# Patient Record
Sex: MALE | Race: OTHER | NOT HISPANIC OR LATINO
[De-identification: names, ages, dates, MRNs, and addresses within clinical notes are randomized per-mention and may not be internally consistent; named-entity substitution may affect disease eponyms.]

---

## 2015-12-06 RX ORDER — METOPROLOL TARTRATE 50 MG
0.5 TABLET ORAL
Qty: 0 | Refills: 0 | COMMUNITY
Start: 2015-12-06

## 2015-12-06 RX ORDER — WARFARIN SODIUM 2.5 MG/1
1 TABLET ORAL
Qty: 0 | Refills: 0 | COMMUNITY
Start: 2015-12-06

## 2017-01-09 ENCOUNTER — APPOINTMENT (OUTPATIENT)
Dept: CV DIAGNOSITCS | Facility: HOSPITAL | Age: 67
End: 2017-01-09

## 2017-01-09 ENCOUNTER — OUTPATIENT (OUTPATIENT)
Dept: OUTPATIENT SERVICES | Facility: HOSPITAL | Age: 67
LOS: 1 days | End: 2017-01-09
Payer: COMMERCIAL

## 2017-01-09 DIAGNOSIS — Z98.890 OTHER SPECIFIED POSTPROCEDURAL STATES: ICD-10-CM

## 2017-01-09 DIAGNOSIS — Z95.4 PRESENCE OF OTHER HEART-VALVE REPLACEMENT: Chronic | ICD-10-CM

## 2017-01-09 PROCEDURE — C8929: CPT

## 2017-01-09 PROCEDURE — 93306 TTE W/DOPPLER COMPLETE: CPT | Mod: 26

## 2017-02-01 ENCOUNTER — APPOINTMENT (OUTPATIENT)
Dept: ELECTROPHYSIOLOGY | Facility: CLINIC | Age: 67
End: 2017-02-01

## 2017-02-01 VITALS
SYSTOLIC BLOOD PRESSURE: 128 MMHG | HEART RATE: 55 BPM | WEIGHT: 246 LBS | BODY MASS INDEX: 35.22 KG/M2 | HEIGHT: 70 IN | DIASTOLIC BLOOD PRESSURE: 84 MMHG | OXYGEN SATURATION: 96 %

## 2017-05-15 ENCOUNTER — RX RENEWAL (OUTPATIENT)
Age: 67
End: 2017-05-15

## 2017-06-09 ENCOUNTER — APPOINTMENT (OUTPATIENT)
Dept: CARDIOLOGY | Facility: CLINIC | Age: 67
End: 2017-06-09

## 2017-06-09 ENCOUNTER — NON-APPOINTMENT (OUTPATIENT)
Age: 67
End: 2017-06-09

## 2017-06-09 VITALS
BODY MASS INDEX: 36.08 KG/M2 | SYSTOLIC BLOOD PRESSURE: 128 MMHG | OXYGEN SATURATION: 96 % | HEIGHT: 70 IN | WEIGHT: 252 LBS | DIASTOLIC BLOOD PRESSURE: 83 MMHG | HEART RATE: 51 BPM

## 2017-06-09 DIAGNOSIS — R07.89 OTHER CHEST PAIN: ICD-10-CM

## 2017-06-12 ENCOUNTER — RX RENEWAL (OUTPATIENT)
Age: 67
End: 2017-06-12

## 2017-06-23 ENCOUNTER — OUTPATIENT (OUTPATIENT)
Dept: OUTPATIENT SERVICES | Facility: HOSPITAL | Age: 67
LOS: 1 days | End: 2017-06-23
Payer: MEDICARE

## 2017-06-23 ENCOUNTER — APPOINTMENT (OUTPATIENT)
Dept: CV DIAGNOSTICS | Facility: HOSPITAL | Age: 67
End: 2017-06-23

## 2017-06-23 DIAGNOSIS — Z95.4 PRESENCE OF OTHER HEART-VALVE REPLACEMENT: Chronic | ICD-10-CM

## 2017-06-23 DIAGNOSIS — I10 ESSENTIAL (PRIMARY) HYPERTENSION: ICD-10-CM

## 2017-06-23 PROCEDURE — 78452 HT MUSCLE IMAGE SPECT MULT: CPT | Mod: 26

## 2017-06-23 PROCEDURE — 78452 HT MUSCLE IMAGE SPECT MULT: CPT

## 2017-06-23 PROCEDURE — 93018 CV STRESS TEST I&R ONLY: CPT

## 2017-06-23 PROCEDURE — 93016 CV STRESS TEST SUPVJ ONLY: CPT

## 2017-06-23 PROCEDURE — 93017 CV STRESS TEST TRACING ONLY: CPT

## 2017-06-23 PROCEDURE — A9500: CPT

## 2017-07-12 ENCOUNTER — RX RENEWAL (OUTPATIENT)
Age: 67
End: 2017-07-12

## 2017-08-02 ENCOUNTER — APPOINTMENT (OUTPATIENT)
Dept: ELECTROPHYSIOLOGY | Facility: CLINIC | Age: 67
End: 2017-08-02
Payer: COMMERCIAL

## 2017-08-02 ENCOUNTER — NON-APPOINTMENT (OUTPATIENT)
Age: 67
End: 2017-08-02

## 2017-08-02 VITALS — HEART RATE: 50 BPM | SYSTOLIC BLOOD PRESSURE: 137 MMHG | DIASTOLIC BLOOD PRESSURE: 92 MMHG

## 2017-08-02 PROCEDURE — 93000 ELECTROCARDIOGRAM COMPLETE: CPT

## 2017-08-02 PROCEDURE — 99214 OFFICE O/P EST MOD 30 MIN: CPT

## 2017-08-02 RX ORDER — CLARITHROMYCIN 500 MG/1
500 TABLET, FILM COATED ORAL
Qty: 3 | Refills: 0 | Status: DISCONTINUED | COMMUNITY
Start: 2017-05-15 | End: 2017-08-02

## 2017-10-29 ENCOUNTER — EMERGENCY (EMERGENCY)
Facility: HOSPITAL | Age: 67
LOS: 1 days | Discharge: HOME | End: 2017-10-29
Attending: EMERGENCY MEDICINE | Admitting: UROLOGY
Payer: MEDICARE

## 2017-10-29 VITALS
HEART RATE: 71 BPM | OXYGEN SATURATION: 100 % | RESPIRATION RATE: 20 BRPM | TEMPERATURE: 98 F | DIASTOLIC BLOOD PRESSURE: 76 MMHG | SYSTOLIC BLOOD PRESSURE: 124 MMHG

## 2017-10-29 DIAGNOSIS — Z95.4 PRESENCE OF OTHER HEART-VALVE REPLACEMENT: Chronic | ICD-10-CM

## 2017-10-29 DIAGNOSIS — N20.0 CALCULUS OF KIDNEY: ICD-10-CM

## 2017-10-29 LAB
ALBUMIN SERPL ELPH-MCNC: 3.7 G/DL — SIGNIFICANT CHANGE UP (ref 3.3–5)
ALP SERPL-CCNC: 63 U/L — SIGNIFICANT CHANGE UP (ref 40–120)
ALT FLD-CCNC: 37 U/L RC — SIGNIFICANT CHANGE UP (ref 10–45)
ANION GAP SERPL CALC-SCNC: 15 MMOL/L — SIGNIFICANT CHANGE UP (ref 5–17)
APPEARANCE UR: CLEAR — SIGNIFICANT CHANGE UP
AST SERPL-CCNC: 26 U/L — SIGNIFICANT CHANGE UP (ref 10–40)
BASOPHILS # BLD AUTO: 0.1 K/UL — SIGNIFICANT CHANGE UP (ref 0–0.2)
BASOPHILS NFR BLD AUTO: 0.6 % — SIGNIFICANT CHANGE UP (ref 0–2)
BILIRUB SERPL-MCNC: 0.4 MG/DL — SIGNIFICANT CHANGE UP (ref 0.2–1.2)
BILIRUB UR-MCNC: NEGATIVE — SIGNIFICANT CHANGE UP
BUN SERPL-MCNC: 21 MG/DL — SIGNIFICANT CHANGE UP (ref 7–23)
CALCIUM SERPL-MCNC: 9.3 MG/DL — SIGNIFICANT CHANGE UP (ref 8.4–10.5)
CHLORIDE SERPL-SCNC: 99 MMOL/L — SIGNIFICANT CHANGE UP (ref 96–108)
CO2 SERPL-SCNC: 24 MMOL/L — SIGNIFICANT CHANGE UP (ref 22–31)
COLOR SPEC: YELLOW — SIGNIFICANT CHANGE UP
CREAT SERPL-MCNC: 1.74 MG/DL — HIGH (ref 0.5–1.3)
DIFF PNL FLD: ABNORMAL
EOSINOPHIL # BLD AUTO: 0.2 K/UL — SIGNIFICANT CHANGE UP (ref 0–0.5)
EOSINOPHIL NFR BLD AUTO: 2.4 % — SIGNIFICANT CHANGE UP (ref 0–6)
GLUCOSE SERPL-MCNC: 106 MG/DL — HIGH (ref 70–99)
GLUCOSE UR QL: NEGATIVE — SIGNIFICANT CHANGE UP
HCT VFR BLD CALC: 38.5 % — LOW (ref 39–50)
HGB BLD-MCNC: 10.5 G/DL — LOW (ref 13–17)
KETONES UR-MCNC: NEGATIVE — SIGNIFICANT CHANGE UP
LEUKOCYTE ESTERASE UR-ACNC: ABNORMAL
LYMPHOCYTES # BLD AUTO: 1.8 K/UL — SIGNIFICANT CHANGE UP (ref 1–3.3)
LYMPHOCYTES # BLD AUTO: 20.2 % — SIGNIFICANT CHANGE UP (ref 13–44)
MCHC RBC-ENTMCNC: 25 PG — LOW (ref 27–34)
MCHC RBC-ENTMCNC: 27.2 GM/DL — LOW (ref 32–36)
MCV RBC AUTO: 92 FL — SIGNIFICANT CHANGE UP (ref 80–100)
MONOCYTES # BLD AUTO: 0.9 K/UL — SIGNIFICANT CHANGE UP (ref 0–0.9)
MONOCYTES NFR BLD AUTO: 10.1 % — SIGNIFICANT CHANGE UP (ref 2–14)
NEUTROPHILS # BLD AUTO: 5.9 K/UL — SIGNIFICANT CHANGE UP (ref 1.8–7.4)
NEUTROPHILS NFR BLD AUTO: 66.7 % — SIGNIFICANT CHANGE UP (ref 43–77)
NITRITE UR-MCNC: NEGATIVE — SIGNIFICANT CHANGE UP
PH UR: 5.5 — SIGNIFICANT CHANGE UP (ref 5–8)
PLATELET # BLD AUTO: 280 K/UL — SIGNIFICANT CHANGE UP (ref 150–400)
POTASSIUM SERPL-MCNC: 4.6 MMOL/L — SIGNIFICANT CHANGE UP (ref 3.5–5.3)
POTASSIUM SERPL-SCNC: 4.6 MMOL/L — SIGNIFICANT CHANGE UP (ref 3.5–5.3)
PROT SERPL-MCNC: 8 G/DL — SIGNIFICANT CHANGE UP (ref 6–8.3)
PROT UR-MCNC: 30 MG/DL
RBC # BLD: 4.19 M/UL — LOW (ref 4.2–5.8)
RBC # FLD: 12.1 % — SIGNIFICANT CHANGE UP (ref 10.3–14.5)
SODIUM SERPL-SCNC: 138 MMOL/L — SIGNIFICANT CHANGE UP (ref 135–145)
SP GR SPEC: 1.02 — SIGNIFICANT CHANGE UP (ref 1.01–1.02)
UROBILINOGEN FLD QL: NEGATIVE — SIGNIFICANT CHANGE UP
WBC # BLD: 8.8 K/UL — SIGNIFICANT CHANGE UP (ref 3.8–10.5)
WBC # FLD AUTO: 8.8 K/UL — SIGNIFICANT CHANGE UP (ref 3.8–10.5)

## 2017-10-29 RX ORDER — VALSARTAN 80 MG/1
40 TABLET ORAL DAILY
Qty: 0 | Refills: 0 | Status: DISCONTINUED | OUTPATIENT
Start: 2017-10-29 | End: 2017-10-30

## 2017-10-29 RX ORDER — SENNA PLUS 8.6 MG/1
2 TABLET ORAL AT BEDTIME
Qty: 0 | Refills: 0 | Status: DISCONTINUED | OUTPATIENT
Start: 2017-10-29 | End: 2017-10-30

## 2017-10-29 RX ORDER — SODIUM CHLORIDE 9 MG/ML
1000 INJECTION INTRAMUSCULAR; INTRAVENOUS; SUBCUTANEOUS ONCE
Qty: 0 | Refills: 0 | Status: COMPLETED | OUTPATIENT
Start: 2017-10-29 | End: 2017-10-29

## 2017-10-29 RX ORDER — OMEGA-3 ACID ETHYL ESTERS 1 G
2 CAPSULE ORAL
Qty: 0 | Refills: 0 | Status: DISCONTINUED | OUTPATIENT
Start: 2017-10-29 | End: 2017-10-30

## 2017-10-29 RX ORDER — HEPARIN SODIUM 5000 [USP'U]/ML
5000 INJECTION INTRAVENOUS; SUBCUTANEOUS EVERY 8 HOURS
Qty: 0 | Refills: 0 | Status: DISCONTINUED | OUTPATIENT
Start: 2017-10-29 | End: 2017-10-30

## 2017-10-29 RX ORDER — ACETAMINOPHEN 500 MG
650 TABLET ORAL EVERY 6 HOURS
Qty: 0 | Refills: 0 | Status: DISCONTINUED | OUTPATIENT
Start: 2017-10-29 | End: 2017-10-30

## 2017-10-29 RX ORDER — TAMSULOSIN HYDROCHLORIDE 0.4 MG/1
0.4 CAPSULE ORAL DAILY
Qty: 0 | Refills: 0 | Status: DISCONTINUED | OUTPATIENT
Start: 2017-10-29 | End: 2017-10-30

## 2017-10-29 RX ORDER — DOCUSATE SODIUM 100 MG
100 CAPSULE ORAL
Qty: 0 | Refills: 0 | Status: DISCONTINUED | OUTPATIENT
Start: 2017-10-29 | End: 2017-10-30

## 2017-10-29 RX ORDER — ASPIRIN/CALCIUM CARB/MAGNESIUM 324 MG
81 TABLET ORAL DAILY
Qty: 0 | Refills: 0 | Status: DISCONTINUED | OUTPATIENT
Start: 2017-10-29 | End: 2017-10-29

## 2017-10-29 RX ORDER — OXYCODONE AND ACETAMINOPHEN 5; 325 MG/1; MG/1
1 TABLET ORAL EVERY 4 HOURS
Qty: 0 | Refills: 0 | Status: DISCONTINUED | OUTPATIENT
Start: 2017-10-29 | End: 2017-10-30

## 2017-10-29 RX ORDER — SODIUM CHLORIDE 9 MG/ML
1000 INJECTION INTRAMUSCULAR; INTRAVENOUS; SUBCUTANEOUS
Qty: 0 | Refills: 0 | Status: DISCONTINUED | OUTPATIENT
Start: 2017-10-29 | End: 2017-10-30

## 2017-10-29 RX ORDER — METOPROLOL TARTRATE 50 MG
25 TABLET ORAL
Qty: 0 | Refills: 0 | Status: DISCONTINUED | OUTPATIENT
Start: 2017-10-29 | End: 2017-10-30

## 2017-10-29 RX ORDER — ASPIRIN/CALCIUM CARB/MAGNESIUM 324 MG
81 TABLET ORAL DAILY
Qty: 0 | Refills: 0 | Status: DISCONTINUED | OUTPATIENT
Start: 2017-10-29 | End: 2017-10-30

## 2017-10-29 RX ORDER — OXYCODONE AND ACETAMINOPHEN 5; 325 MG/1; MG/1
2 TABLET ORAL EVERY 4 HOURS
Qty: 0 | Refills: 0 | Status: DISCONTINUED | OUTPATIENT
Start: 2017-10-29 | End: 2017-10-30

## 2017-10-29 RX ORDER — WARFARIN SODIUM 2.5 MG/1
5 TABLET ORAL ONCE
Qty: 0 | Refills: 0 | Status: COMPLETED | OUTPATIENT
Start: 2017-10-29 | End: 2017-10-29

## 2017-10-29 RX ORDER — ATORVASTATIN CALCIUM 80 MG/1
20 TABLET, FILM COATED ORAL AT BEDTIME
Qty: 0 | Refills: 0 | Status: DISCONTINUED | OUTPATIENT
Start: 2017-10-29 | End: 2017-10-30

## 2017-10-29 RX ADMIN — ATORVASTATIN CALCIUM 20 MILLIGRAM(S): 80 TABLET, FILM COATED ORAL at 21:50

## 2017-10-29 RX ADMIN — WARFARIN SODIUM 5 MILLIGRAM(S): 2.5 TABLET ORAL at 21:50

## 2017-10-29 RX ADMIN — Medication 25 MILLIGRAM(S): at 20:28

## 2017-10-29 RX ADMIN — SODIUM CHLORIDE 1000 MILLILITER(S): 9 INJECTION INTRAMUSCULAR; INTRAVENOUS; SUBCUTANEOUS at 12:33

## 2017-10-29 RX ADMIN — Medication 100 MILLIGRAM(S): at 21:50

## 2017-10-29 NOTE — ED PROVIDER NOTE - CARE PLAN
Principal Discharge DX:	Kidney stones Principal Discharge DX:	Kidney stones  Secondary Diagnosis:	DON (acute kidney injury)

## 2017-10-29 NOTE — ED PROVIDER NOTE - OBJECTIVE STATEMENT
67 yo M with known nephrolithiasis, recently seen at [facility] on 10/23 with CT results showing 3mm left proximal ureteral calculus, also with 1.7mm left intrarenal calculus c/o continuation of sx despite outpatient pain mgt. Reports being d/c from the hospital on Monday feeling well but started spiking a fever on Wednesday tmax 100.9, +chills, +dyusria/hematuria/oliguria. Pain today started at 1am and woke him from sleep. Left sided flank with radiation into the left groin, 2/10 at rest, 8/10 with spasms. No emesis. Also with PMH of CAD s/p CABG, mitral valve repair 2004, BPH, HTN, HLD but denies any chest pain, SOB, HA, dizziness, recent travel.   PMD:  Dr. Ashley Saab

## 2017-10-29 NOTE — H&P ADULT - ASSESSMENT
66M with 4mm left UVJ stone, DON, poor pain control. Noted to have a single temperature last week of 100.9. U/A without appearance of infection.    --admit for observation  --IVF  --continue flomax, strain urine  --f/up urine culture  --trend Cr  --continue home meds  --NPO pMN in case of rise in Cr

## 2017-10-29 NOTE — ED PROVIDER NOTE - MEDICAL DECISION MAKING DETAILS
67 yo M known nephrolithiasis, recently seen in the ED 10/23 at [facility] here with his son (family medicine physician) for continuation of pain and sx. +fever/chills/dysuria/hematuria   .88/30 67 yo M known nephrolithiasis, recently seen in the ED 10/23 at [facility] here with his son (family medicine physician) for continuation of pain and sx. +fever/chills/dysuria/hematuria, recent BUN/Cr, .88/30. Rule-out infected stone, repeat US for hydro, cbc/cmp/UA, did not want pain mgt at this time, reassess  Ariana Kaba, PGY-1 EM    .88/30 65 yo M known nephrolithiasis, recently seen in the ED 10/23 at [facility] here with his son (family medicine physician) for continuation of pain and sx. +fever/chills/dysuria/hematuria, recent BUN/Cr, .88/30. Rule-out infected stone, repeat US for hydro, cbc/cmp/UA, did not want pain mgt at this time, reassess  Ariana Kaba, PGY-1 RUBIN Bear MD: Pt is a 65 yo male with PMH HTN, HLD, CAD who was recently dx at [facility] on 10/23 with a 3mm L distal ureteral stone and a 1.3cm L infrarenal aortic aneurysm who presents for con't flank pain. Per patient, he developed L sided flank pain 5 days ago when dx with the kidney stone. Pain had initially improved, then returned yesterday. c/o L sided intermittent flank pain with radiation to L groin. He states pain is currently 2/10, sometimes worse. He had gross hematuria earlier in the week which has resolved. He had fevers 3 days ago (Tm 100.9), but no longer febrile. Has been taking tylenol for pain, and does not want anything currently for pain. Denies n/v. DDx: infected stone, ureteral colic, uti. Plan: renal US, basic labs, u/a, ucx. Discussed US vs. CT with son (also a physician), and they agreed with US.

## 2017-10-29 NOTE — H&P ADULT - HISTORY OF PRESENT ILLNESS
67 y/o M with known L ureteral stone, presents with uncontrolled pain. Had L flank pain, evaluated at [facility] on 10/23, diagnosed with 1.7 L non-obstructing renal stone and 3 mm L proximal ureteral stone. Discharged with flomax and pain medicine. Notes he had a fever the next day to 100.9, but has not had any since. Denies dysuria, frequency, urgency, difficulty voiding. Notes hematuria, L groin pain.     In ED, noted to have acute rise in SCr to 1.74 from baseline 0.88 last week. Notes poor appetite, denies n/v. No NSAID use.     No prior history of stones. Notes pain currently 2/10 with pain medication.          Also with PMH of CAD s/p CABG, mitral valve repair 2004, BPH, HTN, HLD, on coumadin

## 2017-10-29 NOTE — H&P ADULT - ATTENDING COMMENTS
patient seen and examined. Admitted for observation. Stent placement to be considered for intractable pain, n/v, or fevers. Otherwise will provide fluids and pain medication. Will need large left renal stone addressed separately.

## 2017-10-29 NOTE — ED PROVIDER NOTE - PROGRESS NOTE DETAILS
REBEKAH Bear MD: Evaluated by Urology, they recommend admission for medical expulsive therapy for ureteral stone (still present on CT today), with possible stone removal procedure tomorrow.

## 2017-10-30 ENCOUNTER — TRANSCRIPTION ENCOUNTER (OUTPATIENT)
Age: 67
End: 2017-10-30

## 2017-10-30 VITALS
OXYGEN SATURATION: 96 % | TEMPERATURE: 98 F | HEART RATE: 61 BPM | SYSTOLIC BLOOD PRESSURE: 109 MMHG | DIASTOLIC BLOOD PRESSURE: 60 MMHG | RESPIRATION RATE: 18 BRPM

## 2017-10-30 LAB
ANION GAP SERPL CALC-SCNC: 11 MMOL/L — SIGNIFICANT CHANGE UP (ref 5–17)
BUN SERPL-MCNC: 22 MG/DL — SIGNIFICANT CHANGE UP (ref 7–23)
CALCIUM SERPL-MCNC: 9.3 MG/DL — SIGNIFICANT CHANGE UP (ref 8.4–10.5)
CHLORIDE SERPL-SCNC: 102 MMOL/L — SIGNIFICANT CHANGE UP (ref 96–108)
CO2 SERPL-SCNC: 27 MMOL/L — SIGNIFICANT CHANGE UP (ref 22–31)
CREAT SERPL-MCNC: 1.5 MG/DL — HIGH (ref 0.5–1.3)
CULTURE RESULTS: NO GROWTH — SIGNIFICANT CHANGE UP
GLUCOSE SERPL-MCNC: 91 MG/DL — SIGNIFICANT CHANGE UP (ref 70–99)
INR BLD: 2.61 RATIO — HIGH (ref 0.88–1.16)
POTASSIUM SERPL-MCNC: 4.7 MMOL/L — SIGNIFICANT CHANGE UP (ref 3.5–5.3)
POTASSIUM SERPL-SCNC: 4.7 MMOL/L — SIGNIFICANT CHANGE UP (ref 3.5–5.3)
PROTHROM AB SERPL-ACNC: 28.8 SEC — HIGH (ref 9.8–12.7)
SODIUM SERPL-SCNC: 140 MMOL/L — SIGNIFICANT CHANGE UP (ref 135–145)
SPECIMEN SOURCE: SIGNIFICANT CHANGE UP

## 2017-10-30 PROCEDURE — 85610 PROTHROMBIN TIME: CPT

## 2017-10-30 PROCEDURE — 80048 BASIC METABOLIC PNL TOTAL CA: CPT

## 2017-10-30 PROCEDURE — 81001 URINALYSIS AUTO W/SCOPE: CPT

## 2017-10-30 PROCEDURE — 85027 COMPLETE CBC AUTOMATED: CPT

## 2017-10-30 PROCEDURE — 80053 COMPREHEN METABOLIC PANEL: CPT

## 2017-10-30 PROCEDURE — 99285 EMERGENCY DEPT VISIT HI MDM: CPT | Mod: 25

## 2017-10-30 PROCEDURE — 87086 URINE CULTURE/COLONY COUNT: CPT

## 2017-10-30 PROCEDURE — 76770 US EXAM ABDO BACK WALL COMP: CPT

## 2017-10-30 PROCEDURE — 74176 CT ABD & PELVIS W/O CONTRAST: CPT

## 2017-10-30 RX ORDER — TAMSULOSIN HYDROCHLORIDE 0.4 MG/1
1 CAPSULE ORAL
Qty: 0 | Refills: 0 | COMMUNITY
Start: 2017-10-30

## 2017-10-30 RX ORDER — VALSARTAN 80 MG/1
1 TABLET ORAL
Qty: 0 | Refills: 0 | COMMUNITY
Start: 2017-10-30

## 2017-10-30 RX ORDER — TAMSULOSIN HYDROCHLORIDE 0.4 MG/1
2 CAPSULE ORAL
Qty: 0 | Refills: 0 | COMMUNITY
Start: 2017-10-30

## 2017-10-30 RX ORDER — INFLUENZA VIRUS VACCINE 15; 15; 15; 15 UG/.5ML; UG/.5ML; UG/.5ML; UG/.5ML
0.5 SUSPENSION INTRAMUSCULAR ONCE
Qty: 0 | Refills: 0 | Status: DISCONTINUED | OUTPATIENT
Start: 2017-10-30 | End: 2017-10-30

## 2017-10-30 RX ADMIN — VALSARTAN 40 MILLIGRAM(S): 80 TABLET ORAL at 06:33

## 2017-10-30 RX ADMIN — TAMSULOSIN HYDROCHLORIDE 0.4 MILLIGRAM(S): 0.4 CAPSULE ORAL at 12:10

## 2017-10-30 RX ADMIN — Medication 2 GRAM(S): at 10:12

## 2017-10-30 RX ADMIN — Medication 81 MILLIGRAM(S): at 12:11

## 2017-10-30 RX ADMIN — Medication 25 MILLIGRAM(S): at 06:34

## 2017-10-30 RX ADMIN — OXYCODONE AND ACETAMINOPHEN 1 TABLET(S): 5; 325 TABLET ORAL at 01:00

## 2017-10-30 RX ADMIN — Medication 100 MILLIGRAM(S): at 06:34

## 2017-10-30 NOTE — PROGRESS NOTE ADULT - SUBJECTIVE AND OBJECTIVE BOX
UROLOGY DAILY PROGRESS NOTE:     Subjective: Patient seen and examined at bedside. No overnight events. Pain controlled.       Objective:  Vital signs  T(F): , Max: 99.4 (10-29-17 @ 20:27)  HR: 57 (10-30-17 @ 05:52)  BP: 116/77 (10-30-17 @ 05:52)  SpO2: 95% (10-30-17 @ 05:52)  Wt(kg): --    I&O's Summary    29 Oct 2017 07:01  -  30 Oct 2017 07:00  --------------------------------------------------------  IN: 300 mL / OUT: 1675 mL / NET: -1375 mL        Gen: NAD  Pulm: No respiratory distress, no subcostal retractions  CV: RRR, no JVD  Abd: Soft, NT, ND  Back: No CVAT    Labs:  10-29  8.8   / 38.5  /1.74                           10.5   8.8   )-----------( 280      ( 29 Oct 2017 08:22 )             38.5     10-29    138  |  99  |  21  ----------------------------<  106<H>  4.6   |  24  |  1.74<H>    Ca    9.3      29 Oct 2017 08:22    TPro  8.0  /  Alb  3.7  /  TBili  0.4  /  DBili  x   /  AST  26  /  ALT  37  /  AlkPhos  63  10-29    PT/INR - ( 30 Oct 2017 06:55 )   PT: 28.8 sec;   INR: 2.61 ratio             Urine Cx:

## 2017-10-30 NOTE — DISCHARGE NOTE ADULT - MEDICATION SUMMARY - MEDICATIONS TO TAKE
I will START or STAY ON the medications listed below when I get home from the hospital:    Aspir 81  --  by mouth   -- Indication: For Cardiprotective    oxyCODONE-acetaminophen 5 mg-325 mg oral tablet  -- 2 tab(s) by mouth every 6 hours MDD:8  -- Caution federal law prohibits the transfer of this drug to any person other  than the person for whom it was prescribed.  May cause drowsiness.  Alcohol may intensify this effect.  Use care when operating dangerous machinery.  This prescription cannot be refilled.  This product contains acetaminophen.  Do not use  with any other product containing acetaminophen to prevent possible liver damage.  Using more of this medication than prescribed may cause serious breathing problems.    -- Indication: For pain relief    valsartan 40 mg oral tablet  -- 1 tab(s) by mouth once a day  -- Indication: For hypertension     Flomax 0.4 mg oral capsule  -- 1 cap(s) by mouth once a day  -- Indication: For Colic    warfarin 5 mg oral tablet  -- 1 tab(s) by mouth at bedtime  -- Indication: For mitral valve    atorvastatin 20 mg oral tablet  -- 1 tab(s) by mouth once a day (at bedtime)  -- Indication: For hyperlipidemia    Metoprolol Tartrate 25 mg oral tablet  -- 1 tab(s) by mouth 2 times a day  -- Indication: For hypertension     Fish Oil  --  by mouth   -- Indication: For natural     Lovaza 1000 mg oral capsule  -- 1 cap(s) by mouth once a day  -- Indication: For natural     Foltanx  --  by mouth   -- Indication: For natural

## 2017-10-30 NOTE — DISCHARGE NOTE ADULT - CARE PLAN
Principal Discharge DX:	DON (acute kidney injury)  Goal:	improved renal function  Instructions for follow-up, activity and diet:	maintain adequate hydration  followup urology this week to assess renal function continues to improve  flomax as needed for renal colic  call office Er fever>101, unable to void, pain not relieved by oral meds, unable to tolerate diet  Secondary Diagnosis:	Hypertension  Goal:	healthy BP  Instructions for follow-up, activity and diet:	continue current meds  followup up PMD regularly  Secondary Diagnosis:	H/O mitral valve replacement  Instructions for follow-up, activity and diet:	continue current meds Coumadin   followup up PMD regularly for INR check  Secondary Diagnosis:	Kidney stones  Goal:	resolution of stone  Instructions for follow-up, activity and diet:	flomax as needed for renal colic  call office Er fever>101, unable to void, pain not relieved by oral meds, unable to tolerate diet

## 2017-10-30 NOTE — DISCHARGE NOTE ADULT - HOSPITAL COURSE
65 yo male L 4mm UVJ calculi represented to Er c/o colic. Patient also had increase in creatinine and was admitted pain management and IV hydration. Creatinine improved as did pain. Upon discahrge voiding, afebrile tolerating diet pain well managed.

## 2017-10-30 NOTE — DISCHARGE NOTE ADULT - CONDITIONS AT DISCHARGE
Patient a&xo4. Patient VSS. Patient Iv removed with no signs/symptoms of redness/swelling. patient verbalized understanding of discharge/medication instructions.

## 2017-10-30 NOTE — DISCHARGE NOTE ADULT - PLAN OF CARE
healthy BP continue current meds  followup up PMD regularly continue current meds Coumadin   followup up PMD regularly for INR check resolution of stone flomax as needed for renal colic  call office Er fever>101, unable to void, pain not relieved by oral meds, unable to tolerate diet improved renal function maintain adequate hydration  followup urology this week to assess renal function continues to improve  flomax as needed for renal colic  call office Er fever>101, unable to void, pain not relieved by oral meds, unable to tolerate diet

## 2017-10-30 NOTE — DISCHARGE NOTE ADULT - PATIENT PORTAL LINK FT
“You can access the [facility] Patient Portal, offered by [facility], by registering with the following website: [email or web address]”

## 2017-11-08 ENCOUNTER — APPOINTMENT (OUTPATIENT)
Dept: ELECTROPHYSIOLOGY | Facility: CLINIC | Age: 67
End: 2017-11-08
Payer: MEDICARE

## 2017-11-08 ENCOUNTER — NON-APPOINTMENT (OUTPATIENT)
Age: 67
End: 2017-11-08

## 2017-11-08 ENCOUNTER — APPOINTMENT (OUTPATIENT)
Dept: ELECTROPHYSIOLOGY | Facility: CLINIC | Age: 67
End: 2017-11-08

## 2017-11-08 VITALS — SYSTOLIC BLOOD PRESSURE: 112 MMHG | OXYGEN SATURATION: 96 % | HEART RATE: 52 BPM | DIASTOLIC BLOOD PRESSURE: 72 MMHG

## 2017-11-08 PROBLEM — N20.0 CALCULUS OF KIDNEY: Chronic | Status: ACTIVE | Noted: 2017-10-29

## 2017-11-08 PROCEDURE — 93000 ELECTROCARDIOGRAM COMPLETE: CPT

## 2017-11-08 PROCEDURE — 99214 OFFICE O/P EST MOD 30 MIN: CPT

## 2017-11-08 RX ORDER — ROSUVASTATIN CALCIUM 40 MG/1
40 TABLET, FILM COATED ORAL
Qty: 90 | Refills: 0 | Status: ACTIVE | COMMUNITY
Start: 2017-09-21

## 2017-11-08 RX ORDER — METFORMIN ER 500 MG 500 MG/1
500 TABLET ORAL
Qty: 360 | Refills: 0 | Status: COMPLETED | COMMUNITY
Start: 2017-09-29

## 2017-11-09 ENCOUNTER — APPOINTMENT (OUTPATIENT)
Dept: UROLOGY | Facility: CLINIC | Age: 67
End: 2017-11-09

## 2017-11-09 DIAGNOSIS — I10 ESSENTIAL (PRIMARY) HYPERTENSION: ICD-10-CM

## 2017-11-09 DIAGNOSIS — R10.9 UNSPECIFIED ABDOMINAL PAIN: ICD-10-CM

## 2017-11-09 DIAGNOSIS — Z88.0 ALLERGY STATUS TO PENICILLIN: ICD-10-CM

## 2017-11-09 DIAGNOSIS — E78.5 HYPERLIPIDEMIA, UNSPECIFIED: ICD-10-CM

## 2017-11-09 DIAGNOSIS — I25.10 ATHEROSCLEROTIC HEART DISEASE OF NATIVE CORONARY ARTERY WITHOUT ANGINA PECTORIS: ICD-10-CM

## 2017-11-09 DIAGNOSIS — Z79.899 OTHER LONG TERM (CURRENT) DRUG THERAPY: ICD-10-CM

## 2017-11-09 DIAGNOSIS — N20.0 CALCULUS OF KIDNEY: ICD-10-CM

## 2017-11-09 DIAGNOSIS — Z79.82 LONG TERM (CURRENT) USE OF ASPIRIN: ICD-10-CM

## 2017-11-09 DIAGNOSIS — N17.9 ACUTE KIDNEY FAILURE, UNSPECIFIED: ICD-10-CM

## 2017-12-13 ENCOUNTER — EMERGENCY (EMERGENCY)
Facility: HOSPITAL | Age: 67
LOS: 1 days | Discharge: HOME | End: 2017-12-13
Attending: EMERGENCY MEDICINE | Admitting: EMERGENCY MEDICINE
Payer: MEDICARE

## 2017-12-13 VITALS
HEART RATE: 71 BPM | RESPIRATION RATE: 18 BRPM | OXYGEN SATURATION: 99 % | TEMPERATURE: 99 F | SYSTOLIC BLOOD PRESSURE: 130 MMHG | DIASTOLIC BLOOD PRESSURE: 78 MMHG

## 2017-12-13 DIAGNOSIS — Z95.4 PRESENCE OF OTHER HEART-VALVE REPLACEMENT: Chronic | ICD-10-CM

## 2017-12-13 LAB
ALBUMIN SERPL ELPH-MCNC: 3.8 G/DL — SIGNIFICANT CHANGE UP (ref 3.3–5)
ALP SERPL-CCNC: 48 U/L — SIGNIFICANT CHANGE UP (ref 40–120)
ALT FLD-CCNC: 27 U/L RC — SIGNIFICANT CHANGE UP (ref 10–45)
ANION GAP SERPL CALC-SCNC: 12 MMOL/L — SIGNIFICANT CHANGE UP (ref 5–17)
APPEARANCE UR: ABNORMAL
APTT BLD: 39.8 SEC — HIGH (ref 27.5–37.4)
AST SERPL-CCNC: 25 U/L — SIGNIFICANT CHANGE UP (ref 10–40)
BACTERIA # UR AUTO: ABNORMAL /HPF
BASE EXCESS BLDV CALC-SCNC: -1.5 MMOL/L — SIGNIFICANT CHANGE UP (ref -2–2)
BASOPHILS # BLD AUTO: 0 K/UL — SIGNIFICANT CHANGE UP (ref 0–0.2)
BASOPHILS NFR BLD AUTO: 0.2 % — SIGNIFICANT CHANGE UP (ref 0–2)
BILIRUB SERPL-MCNC: 0.4 MG/DL — SIGNIFICANT CHANGE UP (ref 0.2–1.2)
BILIRUB UR-MCNC: NEGATIVE — SIGNIFICANT CHANGE UP
BUN SERPL-MCNC: 25 MG/DL — HIGH (ref 7–23)
CA-I SERPL-SCNC: 1.21 MMOL/L — SIGNIFICANT CHANGE UP (ref 1.12–1.3)
CALCIUM SERPL-MCNC: 8.6 MG/DL — SIGNIFICANT CHANGE UP (ref 8.4–10.5)
CHLORIDE BLDV-SCNC: 106 MMOL/L — SIGNIFICANT CHANGE UP (ref 96–108)
CHLORIDE SERPL-SCNC: 104 MMOL/L — SIGNIFICANT CHANGE UP (ref 96–108)
CO2 BLDV-SCNC: 25 MMOL/L — SIGNIFICANT CHANGE UP (ref 22–30)
CO2 SERPL-SCNC: 21 MMOL/L — LOW (ref 22–31)
COLOR SPEC: YELLOW — SIGNIFICANT CHANGE UP
COMMENT - URINE: SIGNIFICANT CHANGE UP
CREAT SERPL-MCNC: 0.98 MG/DL — SIGNIFICANT CHANGE UP (ref 0.5–1.3)
DIFF PNL FLD: ABNORMAL
EOSINOPHIL # BLD AUTO: 0.1 K/UL — SIGNIFICANT CHANGE UP (ref 0–0.5)
EOSINOPHIL NFR BLD AUTO: 1 % — SIGNIFICANT CHANGE UP (ref 0–6)
EPI CELLS # UR: SIGNIFICANT CHANGE UP /HPF
GAS PNL BLDV: 137 MMOL/L — SIGNIFICANT CHANGE UP (ref 136–145)
GAS PNL BLDV: SIGNIFICANT CHANGE UP
GLUCOSE BLDV-MCNC: 117 MG/DL — HIGH (ref 70–99)
GLUCOSE SERPL-MCNC: 118 MG/DL — HIGH (ref 70–99)
GLUCOSE UR QL: NEGATIVE — SIGNIFICANT CHANGE UP
HCO3 BLDV-SCNC: 24 MMOL/L — SIGNIFICANT CHANGE UP (ref 21–29)
HCT VFR BLD CALC: 34 % — LOW (ref 39–50)
HCT VFR BLDA CALC: 36 % — LOW (ref 39–50)
HGB BLD CALC-MCNC: 11.7 G/DL — LOW (ref 13–17)
HGB BLD-MCNC: 11.9 G/DL — LOW (ref 13–17)
HYALINE CASTS # UR AUTO: ABNORMAL
INR BLD: 2.91 RATIO — HIGH (ref 0.88–1.16)
KETONES UR-MCNC: NEGATIVE — SIGNIFICANT CHANGE UP
LACTATE BLDV-MCNC: 1.5 MMOL/L — SIGNIFICANT CHANGE UP (ref 0.7–2)
LEUKOCYTE ESTERASE UR-ACNC: ABNORMAL
LYMPHOCYTES # BLD AUTO: 0.7 K/UL — LOW (ref 1–3.3)
LYMPHOCYTES # BLD AUTO: 7.2 % — LOW (ref 13–44)
MCHC RBC-ENTMCNC: 31.6 PG — SIGNIFICANT CHANGE UP (ref 27–34)
MCHC RBC-ENTMCNC: 34.9 GM/DL — SIGNIFICANT CHANGE UP (ref 32–36)
MCV RBC AUTO: 90.5 FL — SIGNIFICANT CHANGE UP (ref 80–100)
MONOCYTES # BLD AUTO: 0.3 K/UL — SIGNIFICANT CHANGE UP (ref 0–0.9)
MONOCYTES NFR BLD AUTO: 3.1 % — SIGNIFICANT CHANGE UP (ref 2–14)
NEUTROPHILS # BLD AUTO: 8.8 K/UL — HIGH (ref 1.8–7.4)
NEUTROPHILS NFR BLD AUTO: 88.4 % — HIGH (ref 43–77)
NITRITE UR-MCNC: NEGATIVE — SIGNIFICANT CHANGE UP
PCO2 BLDV: 44 MMHG — SIGNIFICANT CHANGE UP (ref 35–50)
PH BLDV: 7.35 — SIGNIFICANT CHANGE UP (ref 7.35–7.45)
PH UR: 5.5 — SIGNIFICANT CHANGE UP (ref 5–8)
PLATELET # BLD AUTO: 178 K/UL — SIGNIFICANT CHANGE UP (ref 150–400)
PO2 BLDV: 31 MMHG — SIGNIFICANT CHANGE UP (ref 25–45)
POTASSIUM BLDV-SCNC: 4.2 MMOL/L — SIGNIFICANT CHANGE UP (ref 3.5–5)
POTASSIUM SERPL-MCNC: 4.2 MMOL/L — SIGNIFICANT CHANGE UP (ref 3.5–5.3)
POTASSIUM SERPL-SCNC: 4.2 MMOL/L — SIGNIFICANT CHANGE UP (ref 3.5–5.3)
PROT SERPL-MCNC: 6.9 G/DL — SIGNIFICANT CHANGE UP (ref 6–8.3)
PROT UR-MCNC: 100 MG/DL
PROTHROM AB SERPL-ACNC: 32.1 SEC — HIGH (ref 9.8–12.7)
RAPID RVP RESULT: SIGNIFICANT CHANGE UP
RBC # BLD: 3.76 M/UL — LOW (ref 4.2–5.8)
RBC # FLD: 13.4 % — SIGNIFICANT CHANGE UP (ref 10.3–14.5)
RBC CASTS # UR COMP ASSIST: >50 /HPF (ref 0–2)
SAO2 % BLDV: 56 % — LOW (ref 67–88)
SODIUM SERPL-SCNC: 137 MMOL/L — SIGNIFICANT CHANGE UP (ref 135–145)
SP GR SPEC: 1.02 — SIGNIFICANT CHANGE UP (ref 1.01–1.02)
UROBILINOGEN FLD QL: NEGATIVE — SIGNIFICANT CHANGE UP
WBC # BLD: 10 K/UL — SIGNIFICANT CHANGE UP (ref 3.8–10.5)
WBC # FLD AUTO: 10 K/UL — SIGNIFICANT CHANGE UP (ref 3.8–10.5)
WBC UR QL: SIGNIFICANT CHANGE UP /HPF (ref 0–5)

## 2017-12-13 PROCEDURE — 87798 DETECT AGENT NOS DNA AMP: CPT

## 2017-12-13 PROCEDURE — 82947 ASSAY GLUCOSE BLOOD QUANT: CPT

## 2017-12-13 PROCEDURE — 74177 CT ABD & PELVIS W/CONTRAST: CPT

## 2017-12-13 PROCEDURE — 99284 EMERGENCY DEPT VISIT MOD MDM: CPT | Mod: 25

## 2017-12-13 PROCEDURE — 85730 THROMBOPLASTIN TIME PARTIAL: CPT

## 2017-12-13 PROCEDURE — 85014 HEMATOCRIT: CPT

## 2017-12-13 PROCEDURE — 93010 ELECTROCARDIOGRAM REPORT: CPT

## 2017-12-13 PROCEDURE — 82330 ASSAY OF CALCIUM: CPT

## 2017-12-13 PROCEDURE — 87040 BLOOD CULTURE FOR BACTERIA: CPT

## 2017-12-13 PROCEDURE — 96374 THER/PROPH/DIAG INJ IV PUSH: CPT | Mod: XU

## 2017-12-13 PROCEDURE — 82435 ASSAY OF BLOOD CHLORIDE: CPT

## 2017-12-13 PROCEDURE — 87581 M.PNEUMON DNA AMP PROBE: CPT

## 2017-12-13 PROCEDURE — 82803 BLOOD GASES ANY COMBINATION: CPT

## 2017-12-13 PROCEDURE — 87633 RESP VIRUS 12-25 TARGETS: CPT

## 2017-12-13 PROCEDURE — 85027 COMPLETE CBC AUTOMATED: CPT

## 2017-12-13 PROCEDURE — 93005 ELECTROCARDIOGRAM TRACING: CPT

## 2017-12-13 PROCEDURE — 96375 TX/PRO/DX INJ NEW DRUG ADDON: CPT

## 2017-12-13 PROCEDURE — 87086 URINE CULTURE/COLONY COUNT: CPT

## 2017-12-13 PROCEDURE — 85610 PROTHROMBIN TIME: CPT

## 2017-12-13 PROCEDURE — 87486 CHLMYD PNEUM DNA AMP PROBE: CPT

## 2017-12-13 PROCEDURE — 71046 X-RAY EXAM CHEST 2 VIEWS: CPT

## 2017-12-13 PROCEDURE — 81001 URINALYSIS AUTO W/SCOPE: CPT

## 2017-12-13 PROCEDURE — 83605 ASSAY OF LACTIC ACID: CPT

## 2017-12-13 PROCEDURE — 71020: CPT | Mod: 26

## 2017-12-13 PROCEDURE — 84295 ASSAY OF SERUM SODIUM: CPT

## 2017-12-13 PROCEDURE — 80053 COMPREHEN METABOLIC PANEL: CPT

## 2017-12-13 PROCEDURE — 84132 ASSAY OF SERUM POTASSIUM: CPT

## 2017-12-13 PROCEDURE — 74177 CT ABD & PELVIS W/CONTRAST: CPT | Mod: 26

## 2017-12-13 RX ORDER — ACETAMINOPHEN 500 MG
1000 TABLET ORAL ONCE
Qty: 0 | Refills: 0 | Status: COMPLETED | OUTPATIENT
Start: 2017-12-13 | End: 2017-12-13

## 2017-12-13 RX ORDER — SODIUM CHLORIDE 9 MG/ML
1000 INJECTION INTRAMUSCULAR; INTRAVENOUS; SUBCUTANEOUS ONCE
Qty: 0 | Refills: 0 | Status: COMPLETED | OUTPATIENT
Start: 2017-12-13 | End: 2017-12-13

## 2017-12-13 RX ORDER — SODIUM CHLORIDE 9 MG/ML
3 INJECTION INTRAMUSCULAR; INTRAVENOUS; SUBCUTANEOUS ONCE
Qty: 0 | Refills: 0 | Status: COMPLETED | OUTPATIENT
Start: 2017-12-13 | End: 2017-12-13

## 2017-12-13 RX ORDER — CEFTRIAXONE 500 MG/1
1 INJECTION, POWDER, FOR SOLUTION INTRAMUSCULAR; INTRAVENOUS ONCE
Qty: 0 | Refills: 0 | Status: COMPLETED | OUTPATIENT
Start: 2017-12-13 | End: 2017-12-13

## 2017-12-13 RX ORDER — SODIUM CHLORIDE 9 MG/ML
500 INJECTION INTRAMUSCULAR; INTRAVENOUS; SUBCUTANEOUS ONCE
Qty: 0 | Refills: 0 | Status: COMPLETED | OUTPATIENT
Start: 2017-12-13 | End: 2017-12-13

## 2017-12-13 RX ADMIN — SODIUM CHLORIDE 500 MILLILITER(S): 9 INJECTION INTRAMUSCULAR; INTRAVENOUS; SUBCUTANEOUS at 22:39

## 2017-12-13 RX ADMIN — SODIUM CHLORIDE 2000 MILLILITER(S): 9 INJECTION INTRAMUSCULAR; INTRAVENOUS; SUBCUTANEOUS at 19:46

## 2017-12-13 RX ADMIN — SODIUM CHLORIDE 3 MILLILITER(S): 9 INJECTION INTRAMUSCULAR; INTRAVENOUS; SUBCUTANEOUS at 19:45

## 2017-12-13 RX ADMIN — CEFTRIAXONE 100 GRAM(S): 500 INJECTION, POWDER, FOR SOLUTION INTRAMUSCULAR; INTRAVENOUS at 19:46

## 2017-12-13 RX ADMIN — SODIUM CHLORIDE 2000 MILLILITER(S): 9 INJECTION INTRAMUSCULAR; INTRAVENOUS; SUBCUTANEOUS at 21:39

## 2017-12-13 RX ADMIN — Medication 400 MILLIGRAM(S): at 19:45

## 2017-12-13 NOTE — ED PROVIDER NOTE - OBJECTIVE STATEMENT
67 y.o. male history of mitral valve replacement, CAD, nephrolithiasis with left ureteral stent s/p laser lithotripsy was supposed to have stent removed today coming in with cough and fever over the past 24 hours.  Pt has documented fevers at home greater than 100.4, did not take any antipyretics prior to arrival.  Also had an episode in the shower feeling lightheaded and a single episode of NBNB vomit.  No dysuria, no hematuria.  No change in back pain.  No abd pain or diarrhea.

## 2017-12-13 NOTE — ED ADULT NURSE REASSESSMENT NOTE - NS ED NURSE REASSESS COMMENT FT1
Patient states he feels better. States he has no chest pain/SOB/N/V/d/dizziness/abdominal pain. States he wants to go home. Vitals stable. Pending dispo.

## 2017-12-13 NOTE — ED ADULT NURSE NOTE - OBJECTIVE STATEMENT
received report from day RN. No note was written upon patient entry to ED. Upon new RNs assessment of patient at 2330 on 12/13/2017, patient a&ox4, ambulating without assistance. He has been afebrile while in my care. Denies chest pain/SOB/V/D/N/abdominal pain/chills/urinary symptoms. received report from day RN. No note was written upon patient entry to ED. Upon new RNs assessment of patient at 2330 on 12/13/2017, patient a&ox4, ambulating without assistance. He has been afebrile while in my care. Denies chest pain/SOB/V/D/N/abdominal pain/chills/urinary symptoms. Came to ED for fevers. He recently had stent for stone placed. Will continue to monitor.

## 2017-12-13 NOTE — ED PROVIDER NOTE - ATTENDING CONTRIBUTION TO CARE
Attending MD Spencer:   I personally have seen and examined this patient.  Physician assistant note reviewed and agree on plan of care and except where noted.  See below for details.     67M with PMH including MV replacement, CAD, nephrolithiasis s/p lithotripsy w L ureteral stent for removal today presents to the ED with fever and cough for one day.  Reports had L ureteral stent and laser lithotripsy at [facility].  Reports now one day of fever, cough, lightheadedness, one episode of nonbloody, nonbilious vomiting.  Reports cough nonproductive.  Reports fever at home 100.4 this AM, no antipyretic.  Denies chest pain.  Reports unchanged back pain.  Denies dysuria, hematuria.  On exam, head NCAT, PERRL, FROM at neck, no tenderness to palpation or stepoffs along length of spine, lungs CTAB with good inspiratory effort, +S1S2, no m/r/g, abdomen soft with +BS, NT, ND, mild bilateral CVAT, L stent in place with no surrounding erythema, moving all extremities with 5/5 strength bilateral upper and lower extremities, good and equal  strength bilaterally; A/P: 67M with fever in setting of recent L stent placement, will proceed with infectious work up, Ddx includes UTI/Pyelo vs URI/PNA given cough, will obtain labs, CXR, RVP, UA, UrCx, will reach out to urologist discuss +/- CT, give IVFs, OFirmev, reassess

## 2017-12-13 NOTE — ED PROVIDER NOTE - PROGRESS NOTE DETAILS
Attending MD Spencer: Discussed case with Dr. Tay (urology who placed). Attending MD Spencer: Discussed case with Dr. Tay (urology who placed) including labs, UA and vitals.  Will obtain CT A/P with IV to rule out pyelo in setting of recent stent.  Patient does not wish to await RVP results prior to CT A/P. Attending MD Spencer: RVP negative, awaiting CT read Attending MD Spencer: Patient re-evaluated and feeling improved.  VS reviewed, BP baseline for patient.  No acute issues at  this time.  Lab and radiology tests reviewed with patient and family.  Will treat for pyelo with Levaquin 750mg daily. Patient stable for discharge. Follow up instructions given to follow up with Dr. Tay in 24-48hrs (call tomorrow), importance of follow up emphasized, return to ED parameters reviewed and patient verbalized understanding.  All questions answered, all concerns addressed.

## 2017-12-14 VITALS
DIASTOLIC BLOOD PRESSURE: 65 MMHG | TEMPERATURE: 99 F | HEART RATE: 56 BPM | RESPIRATION RATE: 18 BRPM | OXYGEN SATURATION: 95 % | SYSTOLIC BLOOD PRESSURE: 116 MMHG

## 2017-12-14 LAB
CULTURE RESULTS: SIGNIFICANT CHANGE UP
SPECIMEN SOURCE: SIGNIFICANT CHANGE UP

## 2017-12-19 LAB
CULTURE RESULTS: SIGNIFICANT CHANGE UP
CULTURE RESULTS: SIGNIFICANT CHANGE UP
SPECIMEN SOURCE: SIGNIFICANT CHANGE UP
SPECIMEN SOURCE: SIGNIFICANT CHANGE UP

## 2017-12-22 ENCOUNTER — NON-APPOINTMENT (OUTPATIENT)
Age: 67
End: 2017-12-22

## 2017-12-22 ENCOUNTER — APPOINTMENT (OUTPATIENT)
Dept: CARDIOLOGY | Facility: CLINIC | Age: 67
End: 2017-12-22
Payer: MEDICARE

## 2017-12-22 VITALS
HEIGHT: 70 IN | HEART RATE: 55 BPM | BODY MASS INDEX: 34.36 KG/M2 | WEIGHT: 240 LBS | SYSTOLIC BLOOD PRESSURE: 100 MMHG | DIASTOLIC BLOOD PRESSURE: 63 MMHG | OXYGEN SATURATION: 96 %

## 2017-12-22 PROCEDURE — 99214 OFFICE O/P EST MOD 30 MIN: CPT

## 2017-12-22 PROCEDURE — 93000 ELECTROCARDIOGRAM COMPLETE: CPT

## 2017-12-22 RX ORDER — PHENAZOPYRIDINE HYDROCHLORIDE 200 MG/1
200 TABLET ORAL
Qty: 9 | Refills: 0 | Status: DISCONTINUED | COMMUNITY
Start: 2017-11-15

## 2017-12-22 RX ORDER — FLUTICASONE PROPIONATE 50 UG/1
50 SPRAY, METERED NASAL
Qty: 16 | Refills: 0 | Status: DISCONTINUED | COMMUNITY
Start: 2017-11-28

## 2017-12-22 RX ORDER — FINASTERIDE 5 MG/1
5 TABLET, FILM COATED ORAL
Qty: 30 | Refills: 0 | Status: ACTIVE | COMMUNITY
Start: 2017-11-22

## 2017-12-22 RX ORDER — LEVOFLOXACIN 500 MG/1
500 TABLET, FILM COATED ORAL
Qty: 5 | Refills: 0 | Status: DISCONTINUED | COMMUNITY
Start: 2017-12-02

## 2017-12-22 RX ORDER — LEVOFLOXACIN 750 MG/1
750 TABLET, FILM COATED ORAL
Qty: 7 | Refills: 0 | Status: DISCONTINUED | COMMUNITY
Start: 2017-12-14

## 2018-02-07 ENCOUNTER — APPOINTMENT (OUTPATIENT)
Dept: ELECTROPHYSIOLOGY | Facility: CLINIC | Age: 68
End: 2018-02-07
Payer: MEDICARE

## 2018-02-07 ENCOUNTER — NON-APPOINTMENT (OUTPATIENT)
Age: 68
End: 2018-02-07

## 2018-02-07 VITALS — OXYGEN SATURATION: 95 % | DIASTOLIC BLOOD PRESSURE: 71 MMHG | HEART RATE: 63 BPM | SYSTOLIC BLOOD PRESSURE: 138 MMHG

## 2018-02-07 PROCEDURE — 99215 OFFICE O/P EST HI 40 MIN: CPT

## 2018-02-07 PROCEDURE — 93000 ELECTROCARDIOGRAM COMPLETE: CPT

## 2018-02-07 RX ORDER — TAMSULOSIN HYDROCHLORIDE 0.4 MG/1
0.4 CAPSULE ORAL DAILY
Refills: 0 | Status: ACTIVE | COMMUNITY
Start: 2017-08-17

## 2018-03-12 ENCOUNTER — RX RENEWAL (OUTPATIENT)
Age: 68
End: 2018-03-12

## 2018-03-14 ENCOUNTER — OUTPATIENT (OUTPATIENT)
Dept: OUTPATIENT SERVICES | Facility: HOSPITAL | Age: 68
LOS: 1 days | End: 2018-03-14
Payer: MEDICARE

## 2018-03-14 ENCOUNTER — APPOINTMENT (OUTPATIENT)
Dept: CARDIOTHORACIC SURGERY | Facility: CLINIC | Age: 68
End: 2018-03-14
Payer: MEDICARE

## 2018-03-14 ENCOUNTER — NON-APPOINTMENT (OUTPATIENT)
Age: 68
End: 2018-03-14

## 2018-03-14 VITALS
TEMPERATURE: 98.2 F | OXYGEN SATURATION: 96 % | WEIGHT: 244 LBS | RESPIRATION RATE: 16 BRPM | DIASTOLIC BLOOD PRESSURE: 76 MMHG | HEIGHT: 70 IN | BODY MASS INDEX: 34.93 KG/M2 | SYSTOLIC BLOOD PRESSURE: 123 MMHG | HEART RATE: 70 BPM

## 2018-03-14 DIAGNOSIS — R06.02 SHORTNESS OF BREATH: ICD-10-CM

## 2018-03-14 DIAGNOSIS — Z95.4 PRESENCE OF OTHER HEART-VALVE REPLACEMENT: Chronic | ICD-10-CM

## 2018-03-14 PROCEDURE — 93306 TTE W/DOPPLER COMPLETE: CPT | Mod: 26

## 2018-03-14 PROCEDURE — C8929: CPT

## 2018-03-14 PROCEDURE — 93000 ELECTROCARDIOGRAM COMPLETE: CPT

## 2018-03-14 PROCEDURE — 99215 OFFICE O/P EST HI 40 MIN: CPT

## 2018-03-14 RX ORDER — BENZONATATE 100 MG/1
100 CAPSULE ORAL
Qty: 21 | Refills: 0 | Status: DISCONTINUED | COMMUNITY
Start: 2017-11-28 | End: 2018-03-14

## 2018-03-14 RX ORDER — OXYCODONE AND ACETAMINOPHEN 5; 325 MG/1; MG/1
5-325 TABLET ORAL
Qty: 28 | Refills: 0 | Status: DISCONTINUED | COMMUNITY
Start: 2017-10-24 | End: 2018-03-14

## 2018-03-15 ENCOUNTER — RESULT REVIEW (OUTPATIENT)
Age: 68
End: 2018-03-15

## 2018-03-25 ENCOUNTER — INPATIENT (INPATIENT)
Facility: HOSPITAL | Age: 68
LOS: 3 days | Discharge: HOME | DRG: 188 | End: 2018-03-29
Attending: INTERNAL MEDICINE | Admitting: INTERNAL MEDICINE
Payer: MEDICARE

## 2018-03-25 VITALS
TEMPERATURE: 98 F | OXYGEN SATURATION: 99 % | RESPIRATION RATE: 17 BRPM | SYSTOLIC BLOOD PRESSURE: 125 MMHG | WEIGHT: 240.08 LBS | DIASTOLIC BLOOD PRESSURE: 72 MMHG | HEART RATE: 64 BPM

## 2018-03-25 DIAGNOSIS — J90 PLEURAL EFFUSION, NOT ELSEWHERE CLASSIFIED: ICD-10-CM

## 2018-03-25 DIAGNOSIS — Z95.4 PRESENCE OF OTHER HEART-VALVE REPLACEMENT: Chronic | ICD-10-CM

## 2018-03-25 DIAGNOSIS — R06.09 OTHER FORMS OF DYSPNEA: ICD-10-CM

## 2018-03-25 DIAGNOSIS — I25.10 ATHEROSCLEROTIC HEART DISEASE OF NATIVE CORONARY ARTERY WITHOUT ANGINA PECTORIS: ICD-10-CM

## 2018-03-25 LAB
ALBUMIN SERPL ELPH-MCNC: 4 G/DL — SIGNIFICANT CHANGE UP (ref 3.3–5)
ALP SERPL-CCNC: 57 U/L — SIGNIFICANT CHANGE UP (ref 40–120)
ALT FLD-CCNC: 17 U/L RC — SIGNIFICANT CHANGE UP (ref 10–45)
ANION GAP SERPL CALC-SCNC: 13 MMOL/L — SIGNIFICANT CHANGE UP (ref 5–17)
APTT BLD: 47.4 SEC — HIGH (ref 27.5–37.4)
AST SERPL-CCNC: 18 U/L — SIGNIFICANT CHANGE UP (ref 10–40)
BASE EXCESS BLDV CALC-SCNC: 3.8 MMOL/L — HIGH (ref -2–2)
BASOPHILS # BLD AUTO: 0.1 K/UL — SIGNIFICANT CHANGE UP (ref 0–0.2)
BASOPHILS NFR BLD AUTO: 0.9 % — SIGNIFICANT CHANGE UP (ref 0–2)
BILIRUB SERPL-MCNC: 0.3 MG/DL — SIGNIFICANT CHANGE UP (ref 0.2–1.2)
BUN SERPL-MCNC: 15 MG/DL — SIGNIFICANT CHANGE UP (ref 7–23)
CA-I SERPL-SCNC: 1.25 MMOL/L — SIGNIFICANT CHANGE UP (ref 1.12–1.3)
CALCIUM SERPL-MCNC: 9.9 MG/DL — SIGNIFICANT CHANGE UP (ref 8.4–10.5)
CHLORIDE BLDV-SCNC: 101 MMOL/L — SIGNIFICANT CHANGE UP (ref 96–108)
CHLORIDE SERPL-SCNC: 100 MMOL/L — SIGNIFICANT CHANGE UP (ref 96–108)
CO2 BLDV-SCNC: 31 MMOL/L — HIGH (ref 22–30)
CO2 SERPL-SCNC: 26 MMOL/L — SIGNIFICANT CHANGE UP (ref 22–31)
CREAT SERPL-MCNC: 0.85 MG/DL — SIGNIFICANT CHANGE UP (ref 0.5–1.3)
EOSINOPHIL # BLD AUTO: 0.9 K/UL — HIGH (ref 0–0.5)
EOSINOPHIL NFR BLD AUTO: 12.1 % — HIGH (ref 0–6)
GAS PNL BLDV: 138 MMOL/L — SIGNIFICANT CHANGE UP (ref 136–145)
GAS PNL BLDV: SIGNIFICANT CHANGE UP
GLUCOSE BLDV-MCNC: 113 MG/DL — HIGH (ref 70–99)
GLUCOSE SERPL-MCNC: 122 MG/DL — HIGH (ref 70–99)
HCO3 BLDV-SCNC: 30 MMOL/L — HIGH (ref 21–29)
HCT VFR BLD CALC: 36 % — LOW (ref 39–50)
HCT VFR BLDA CALC: 36 % — LOW (ref 39–50)
HGB BLD CALC-MCNC: 11.8 G/DL — LOW (ref 13–17)
HGB BLD-MCNC: 11.8 G/DL — LOW (ref 13–17)
INR BLD: 2.84 RATIO — HIGH (ref 0.88–1.16)
LACTATE BLDV-MCNC: 1 MMOL/L — SIGNIFICANT CHANGE UP (ref 0.7–2)
LYMPHOCYTES # BLD AUTO: 1.7 K/UL — SIGNIFICANT CHANGE UP (ref 1–3.3)
LYMPHOCYTES # BLD AUTO: 23.7 % — SIGNIFICANT CHANGE UP (ref 13–44)
MCHC RBC-ENTMCNC: 29.3 PG — SIGNIFICANT CHANGE UP (ref 27–34)
MCHC RBC-ENTMCNC: 32.9 GM/DL — SIGNIFICANT CHANGE UP (ref 32–36)
MCV RBC AUTO: 89.1 FL — SIGNIFICANT CHANGE UP (ref 80–100)
MONOCYTES # BLD AUTO: 0.4 K/UL — SIGNIFICANT CHANGE UP (ref 0–0.9)
MONOCYTES NFR BLD AUTO: 5.8 % — SIGNIFICANT CHANGE UP (ref 2–14)
NEUTROPHILS # BLD AUTO: 4.1 K/UL — SIGNIFICANT CHANGE UP (ref 1.8–7.4)
NEUTROPHILS NFR BLD AUTO: 57.5 % — SIGNIFICANT CHANGE UP (ref 43–77)
NT-PROBNP SERPL-SCNC: 289 PG/ML — SIGNIFICANT CHANGE UP (ref 0–300)
PCO2 BLDV: 52 MMHG — HIGH (ref 35–50)
PH BLDV: 7.37 — SIGNIFICANT CHANGE UP (ref 7.35–7.45)
PLATELET # BLD AUTO: 278 K/UL — SIGNIFICANT CHANGE UP (ref 150–400)
PO2 BLDV: 32 MMHG — SIGNIFICANT CHANGE UP (ref 25–45)
POTASSIUM BLDV-SCNC: 4.1 MMOL/L — SIGNIFICANT CHANGE UP (ref 3.5–5)
POTASSIUM SERPL-MCNC: 4.3 MMOL/L — SIGNIFICANT CHANGE UP (ref 3.5–5.3)
POTASSIUM SERPL-SCNC: 4.3 MMOL/L — SIGNIFICANT CHANGE UP (ref 3.5–5.3)
PROT SERPL-MCNC: 7.8 G/DL — SIGNIFICANT CHANGE UP (ref 6–8.3)
PROTHROM AB SERPL-ACNC: 31.3 SEC — HIGH (ref 9.8–12.7)
RBC # BLD: 4.04 M/UL — LOW (ref 4.2–5.8)
RBC # FLD: 12.2 % — SIGNIFICANT CHANGE UP (ref 10.3–14.5)
SAO2 % BLDV: 55 % — LOW (ref 67–88)
SODIUM SERPL-SCNC: 139 MMOL/L — SIGNIFICANT CHANGE UP (ref 135–145)
TROPONIN T SERPL-MCNC: <0.01 NG/ML — SIGNIFICANT CHANGE UP (ref 0–0.06)
WBC # BLD: 7.1 K/UL — SIGNIFICANT CHANGE UP (ref 3.8–10.5)
WBC # FLD AUTO: 7.1 K/UL — SIGNIFICANT CHANGE UP (ref 3.8–10.5)

## 2018-03-25 PROCEDURE — 71046 X-RAY EXAM CHEST 2 VIEWS: CPT | Mod: 26

## 2018-03-25 PROCEDURE — 71275 CT ANGIOGRAPHY CHEST: CPT | Mod: 26

## 2018-03-25 PROCEDURE — 93010 ELECTROCARDIOGRAM REPORT: CPT

## 2018-03-25 PROCEDURE — 99285 EMERGENCY DEPT VISIT HI MDM: CPT | Mod: 25,GC

## 2018-03-25 RX ORDER — ASPIRIN/CALCIUM CARB/MAGNESIUM 324 MG
81 TABLET ORAL DAILY
Qty: 0 | Refills: 0 | Status: DISCONTINUED | OUTPATIENT
Start: 2018-03-25 | End: 2018-03-29

## 2018-03-25 RX ORDER — VALSARTAN 80 MG/1
40 TABLET ORAL DAILY
Qty: 0 | Refills: 0 | Status: DISCONTINUED | OUTPATIENT
Start: 2018-03-25 | End: 2018-03-29

## 2018-03-25 RX ORDER — METOPROLOL TARTRATE 50 MG
25 TABLET ORAL
Qty: 0 | Refills: 0 | Status: DISCONTINUED | OUTPATIENT
Start: 2018-03-25 | End: 2018-03-25

## 2018-03-25 RX ORDER — ONDANSETRON 8 MG/1
4 TABLET, FILM COATED ORAL EVERY 6 HOURS
Qty: 0 | Refills: 0 | Status: DISCONTINUED | OUTPATIENT
Start: 2018-03-25 | End: 2018-03-29

## 2018-03-25 RX ORDER — WARFARIN SODIUM 2.5 MG/1
5 TABLET ORAL ONCE
Qty: 0 | Refills: 0 | Status: DISCONTINUED | OUTPATIENT
Start: 2018-03-25 | End: 2018-03-25

## 2018-03-25 RX ORDER — METOPROLOL TARTRATE 50 MG
12.5 TABLET ORAL
Qty: 0 | Refills: 0 | Status: DISCONTINUED | OUTPATIENT
Start: 2018-03-25 | End: 2018-03-29

## 2018-03-25 RX ORDER — SODIUM CHLORIDE 9 MG/ML
3 INJECTION INTRAMUSCULAR; INTRAVENOUS; SUBCUTANEOUS ONCE
Qty: 0 | Refills: 0 | Status: COMPLETED | OUTPATIENT
Start: 2018-03-25 | End: 2018-03-25

## 2018-03-25 RX ORDER — ATORVASTATIN CALCIUM 80 MG/1
20 TABLET, FILM COATED ORAL AT BEDTIME
Qty: 0 | Refills: 0 | Status: DISCONTINUED | OUTPATIENT
Start: 2018-03-25 | End: 2018-03-29

## 2018-03-25 RX ORDER — TAMSULOSIN HYDROCHLORIDE 0.4 MG/1
0.4 CAPSULE ORAL DAILY
Qty: 0 | Refills: 0 | Status: DISCONTINUED | OUTPATIENT
Start: 2018-03-25 | End: 2018-03-29

## 2018-03-25 RX ADMIN — ATORVASTATIN CALCIUM 20 MILLIGRAM(S): 80 TABLET, FILM COATED ORAL at 21:43

## 2018-03-25 RX ADMIN — Medication 12.5 MILLIGRAM(S): at 18:28

## 2018-03-25 RX ADMIN — SODIUM CHLORIDE 3 MILLILITER(S): 9 INJECTION INTRAMUSCULAR; INTRAVENOUS; SUBCUTANEOUS at 10:03

## 2018-03-25 NOTE — ED PROVIDER NOTE - OBJECTIVE STATEMENT
68yo M with SOB x2 days, worse on exertion, no orthopnea, no LE edema, started on lasix a few weeks ago 20mg qD, had SOB at that time and dx with rt pleural effusion. SOB improved transiently. no CP. hospitalized in december for lithotripsy. otherwise no trauma/sx/dvt/pe. on coumadin for A fib. +dry cough x a few weeks. no URI sx. no sick contacts. no fever/chills    cards- Boutis 68yo M with SOB x2 days, worse on exertion, no orthopnea, no LE edema, started on lasix a few weeks ago 20mg qD, had SOB at that time and dx with rt pleural effusion. SOB improved transiently. no CP. hospitalized in december for lithotripsy. otherwise no trauma/sx/dvt/pe. on coumadin for A fib. +dry cough x a few weeks. no URI sx. no sick contacts. no fever/chills    cards- Boutis    Attendinyo male presents with increasing dyspnea on exertion for the past 2 weeks.  Worse shortness of breath with exertion.  No fever.  no cough.

## 2018-03-25 NOTE — PATIENT PROFILE ADULT. - AS SC BRADEN MOBILITY
(3) slightly limited negative Affect and characteristics of appearance, verbalizations, behaviors are appropriate

## 2018-03-25 NOTE — ED PROVIDER NOTE - MEDICAL DECISION MAKING DETAILS
66yo M with SOB on exertion, exam clinically unremarkable except for slight decreased BS on right. low suspicion for CHF- does not appear fluid overload and recent echo with normal EF. unlikely PE on coumadin and no risk factors, hospitalization was in december and no clinic signs. ACS a possibility has known CAD, had normal nuclear stress Jun 2017. will send labs with trop/bnp/cxr will need at least dTrop and speak with patient private cardiologist 66yo M with SOB on exertion, exam clinically unremarkable except for slight decreased BS on right. low suspicion for CHF- does not appear fluid overload and recent echo with normal EF. unlikely PE on coumadin and no risk factors, hospitalization was in december and no clinic signs. ACS a possibility has known CAD, had normal nuclear stress Jun 2017. Worsening of pleural effusion a possibility as well. will send labs with trop/bnp/cxr will need at least dTrop and speak with patient private cardiologist

## 2018-03-25 NOTE — ED PROVIDER NOTE - PROGRESS NOTE DETAILS
has moderate sized rt pleural effusion, do not have another recent to compare to. Could be enlarging and causing symptoms. Unclear etiology, CHF possibility but significantly asymetric PE is of concern, infectious less likely. will send D-dimer may progress to CTA -Slowey DO has moderate sized rt pleural effusion, do not have another recent to compare to. Could be enlarging and causing symptoms. Unclear etiology, CHF possibility but significantly asymmetric and BNP wnl, PE is of concern, infectious less likely. malignancy also on differential. After discussion with son who is also PCP will get CTA to r/o PE and further evaluate effusion. Clinically stable no need for diagnostic thoracentesis in ED at this time. -Radha ALTAMIRANO unable to get ahelin of Dr. Hoffman. PCP no priveleges will admit unattached -Radha DO

## 2018-03-25 NOTE — ED PROVIDER NOTE - PHYSICAL EXAMINATION
Gen: NAD, AOx3  Head: NCAT  HEENT: PERRL, oral mucosa moist, normal conjunctiva, neck supple  Lung: CTAB, decreased BS slightly rt base, no respiratory distress  CV: rrr, no murmur, Normal perfusion  Abd: soft, NTND, no CVA tenderness  MSK: No edema, no visible deformities  Neuro: No focal neurologic deficits  Skin: No rash   Psych: normal affect

## 2018-03-25 NOTE — H&P ADULT - ASSESSMENT
66yo M with SOB x2 days, worse on exertion, no orthopnea, no LE edema, started on lasix a few weeks ago 20mg qD, had SOB at that time and dx with rt pleural effusion. SOB improved transiently. no CP. hospitalized in december for lithotripsy. otherwise no trauma/sx/dvt/pe. on coumadin for A fib. +dry cough x a few weeks. no URI sx. no sick contacts. no fever/chills

## 2018-03-25 NOTE — ED ADULT NURSE NOTE - OBJECTIVE STATEMENT
66 yo male presents to ED c/o increasing sob over 2 weeks. Pt has h/o heart failure. Pt reports feeling short of breath at rest and increased sob with activity. He denies any chest pain, recent illness, sick contacts, n/v/d, and fevers/ chills. His lung sounds are diminished at the right base and clear at the RUL and left side. Respirations are regular and unlabored. He is overall well appearing. Abd is soft, non tender, non distended. Skin is warm and dry. Color is appropriate for race. VSS/ NAD. Safety and comfort maintained. Son is at bedside. Will continue to monitor.

## 2018-03-25 NOTE — H&P ADULT - NSHPLABSRESULTS_GEN_ALL_CORE
Lab Results:  CBC  CBC Full  -  ( 25 Mar 2018 10:11 )  WBC Count : 7.1 K/uL  Hemoglobin : 11.8 g/dL  Hematocrit : 36.0 %  Platelet Count - Automated : 278 K/uL  Mean Cell Volume : 89.1 fl  Mean Cell Hemoglobin : 29.3 pg  Mean Cell Hemoglobin Concentration : 32.9 gm/dL  Auto Neutrophil # : 4.1 K/uL  Auto Lymphocyte # : 1.7 K/uL  Auto Monocyte # : 0.4 K/uL  Auto Eosinophil # : 0.9 K/uL  Auto Basophil # : 0.1 K/uL  Auto Neutrophil % : 57.5 %  Auto Lymphocyte % : 23.7 %  Auto Monocyte % : 5.8 %  Auto Eosinophil % : 12.1 %  Auto Basophil % : 0.9 %    .		Differential:	[] Automated		[] Manual  Chemistry                        11.8   7.1   )-----------( 278      ( 25 Mar 2018 10:11 )             36.0     03-25    139  |  100  |  15  ----------------------------<  122<H>  4.3   |  26  |  0.85    Ca    9.9      25 Mar 2018 10:11    TPro  7.8  /  Alb  4.0  /  TBili  0.3  /  DBili  x   /  AST  18  /  ALT  17  /  AlkPhos  57  03-25    LIVER FUNCTIONS - ( 25 Mar 2018 10:11 )  Alb: 4.0 g/dL / Pro: 7.8 g/dL / ALK PHOS: 57 U/L / ALT: 17 U/L RC / AST: 18 U/L / GGT: x           PT/INR - ( 25 Mar 2018 10:11 )   PT: 31.3 sec;   INR: 2.84 ratio         PTT - ( 25 Mar 2018 10:11 )  PTT:47.4 sec          MICROBIOLOGY/CULTURES:      RADIOLOGY RESULTS: reviewed

## 2018-03-25 NOTE — H&P ADULT - NSHPPHYSICALEXAM_GEN_ALL_CORE
General: WN/WD NAD  PERRLA  Neurology: A&Ox3, nonfocal, IQBAL x 4  Respiratory: CTA B/L  CV: RRR, S1S2, no murmurs, rubs or gallops  Abdominal: Soft, NT, ND +BS, Last BM  Extremities: No edema, + peripheral pulses  Skin Normal General: WN/WD NAD  PERRLA  Neurology: A&Ox3, nonfocal, IQBAL x 4  Respiratory: decared BS RLL  CV: RRR, S1S2, no murmurs, rubs or gallops  Abdominal: Soft, NT, ND +BS, Last BM  Extremities: No edema, + peripheral pulses  Skin Normal

## 2018-03-26 ENCOUNTER — TRANSCRIPTION ENCOUNTER (OUTPATIENT)
Age: 68
End: 2018-03-26

## 2018-03-26 DIAGNOSIS — Z29.9 ENCOUNTER FOR PROPHYLACTIC MEASURES, UNSPECIFIED: ICD-10-CM

## 2018-03-26 DIAGNOSIS — I48.2 CHRONIC ATRIAL FIBRILLATION: ICD-10-CM

## 2018-03-26 DIAGNOSIS — I25.10 ATHEROSCLEROTIC HEART DISEASE OF NATIVE CORONARY ARTERY WITHOUT ANGINA PECTORIS: ICD-10-CM

## 2018-03-26 LAB
ALBUMIN SERPL ELPH-MCNC: 3.9 G/DL — SIGNIFICANT CHANGE UP (ref 3.3–5)
ALP SERPL-CCNC: 53 U/L — SIGNIFICANT CHANGE UP (ref 40–120)
ALT FLD-CCNC: 15 U/L RC — SIGNIFICANT CHANGE UP (ref 10–45)
ANION GAP SERPL CALC-SCNC: 12 MMOL/L — SIGNIFICANT CHANGE UP (ref 5–17)
AST SERPL-CCNC: 17 U/L — SIGNIFICANT CHANGE UP (ref 10–40)
BILIRUB SERPL-MCNC: 0.4 MG/DL — SIGNIFICANT CHANGE UP (ref 0.2–1.2)
BUN SERPL-MCNC: 13 MG/DL — SIGNIFICANT CHANGE UP (ref 7–23)
CALCIUM SERPL-MCNC: 9.6 MG/DL — SIGNIFICANT CHANGE UP (ref 8.4–10.5)
CHLORIDE SERPL-SCNC: 99 MMOL/L — SIGNIFICANT CHANGE UP (ref 96–108)
CO2 SERPL-SCNC: 26 MMOL/L — SIGNIFICANT CHANGE UP (ref 22–31)
CREAT SERPL-MCNC: 0.84 MG/DL — SIGNIFICANT CHANGE UP (ref 0.5–1.3)
GLUCOSE SERPL-MCNC: 96 MG/DL — SIGNIFICANT CHANGE UP (ref 70–99)
HCT VFR BLD CALC: 33.7 % — LOW (ref 39–50)
HGB BLD-MCNC: 11.1 G/DL — LOW (ref 13–17)
INR BLD: 2.35 RATIO — HIGH (ref 0.88–1.16)
MCHC RBC-ENTMCNC: 28.6 PG — SIGNIFICANT CHANGE UP (ref 27–34)
MCHC RBC-ENTMCNC: 32.9 GM/DL — SIGNIFICANT CHANGE UP (ref 32–36)
MCV RBC AUTO: 86.9 FL — SIGNIFICANT CHANGE UP (ref 80–100)
NRBC # BLD: 0 /100 WBCS — SIGNIFICANT CHANGE UP (ref 0–0)
PLATELET # BLD AUTO: 270 K/UL — SIGNIFICANT CHANGE UP (ref 150–400)
POTASSIUM SERPL-MCNC: 4.1 MMOL/L — SIGNIFICANT CHANGE UP (ref 3.5–5.3)
POTASSIUM SERPL-SCNC: 4.1 MMOL/L — SIGNIFICANT CHANGE UP (ref 3.5–5.3)
PROT SERPL-MCNC: 7.5 G/DL — SIGNIFICANT CHANGE UP (ref 6–8.3)
PROTHROM AB SERPL-ACNC: 27 SEC — HIGH (ref 10–13.1)
RBC # BLD: 3.88 M/UL — LOW (ref 4.2–5.8)
RBC # FLD: 13.6 % — SIGNIFICANT CHANGE UP (ref 10.3–14.5)
SODIUM SERPL-SCNC: 137 MMOL/L — SIGNIFICANT CHANGE UP (ref 135–145)
WBC # BLD: 6.57 K/UL — SIGNIFICANT CHANGE UP (ref 3.8–10.5)
WBC # FLD AUTO: 6.57 K/UL — SIGNIFICANT CHANGE UP (ref 3.8–10.5)

## 2018-03-26 PROCEDURE — 99223 1ST HOSP IP/OBS HIGH 75: CPT

## 2018-03-26 RX ORDER — PHYTONADIONE (VIT K1) 5 MG
5 TABLET ORAL ONCE
Qty: 0 | Refills: 0 | Status: COMPLETED | OUTPATIENT
Start: 2018-03-26 | End: 2018-03-26

## 2018-03-26 RX ADMIN — VALSARTAN 40 MILLIGRAM(S): 80 TABLET ORAL at 10:41

## 2018-03-26 RX ADMIN — TAMSULOSIN HYDROCHLORIDE 0.4 MILLIGRAM(S): 0.4 CAPSULE ORAL at 10:41

## 2018-03-26 RX ADMIN — ATORVASTATIN CALCIUM 20 MILLIGRAM(S): 80 TABLET, FILM COATED ORAL at 22:30

## 2018-03-26 RX ADMIN — Medication 81 MILLIGRAM(S): at 10:41

## 2018-03-26 RX ADMIN — Medication 12.5 MILLIGRAM(S): at 10:41

## 2018-03-26 RX ADMIN — Medication 12.5 MILLIGRAM(S): at 22:31

## 2018-03-26 RX ADMIN — Medication 5 MILLIGRAM(S): at 11:52

## 2018-03-26 NOTE — CONSULT NOTE ADULT - PROBLEM SELECTOR RECOMMENDATION 2
-patient not in acute distress and therefore should have thoracentesis done in as safe manner as possible - once INR improved.  -would continue to hold AC and consider Vitamin K x 1 dose if patient decides to remain inpatient  -patient not on Plavix  -I spoke with  department, [phone], unfortunately given coags patient cannot have procedure done today  -I spoke with Dr. Flynn, it is also reasonable to discharge patient with outpatient followup with Dr. Flynn's office to arrange for thoracentesis while continuing to hold his AC this week - the  department would not allow me to schedule an outpatient procedure at present, they would also require lab work showing no coagulopathy -patient not in acute distress and therefore should have thoracentesis done in as safe manner as possible - once INR improved. Please send LDH, protein, cultures, cytology from pleural fluid sample  -would continue to hold AC and consider Vitamin K x 1 dose if patient decides to remain inpatient  -patient not on Plavix  -I spoke with  department, [phone], unfortunately given coags patient cannot have procedure done today and will need INR< 1.5  -I spoke with Dr. Flynn, it is also reasonable to discharge patient with outpatient followup with Dr. Flynn's office to arrange for thoracentesis while continuing to hold his AC this week - the US department would not allow me to schedule an outpatient procedure at present, they would also require lab work showing no coagulopathy

## 2018-03-26 NOTE — DISCHARGE NOTE ADULT - MEDICATION SUMMARY - MEDICATIONS TO STOP TAKING
I will STOP taking the medications listed below when I get home from the hospital:    Levaquin 750 mg oral tablet  -- 1 tab(s) by mouth every 24 hours MDD:1 tab  -- Avoid prolonged or excessive exposure to direct and/or artificial sunlight while taking this medication.  Do not take dairy products, antacids, or iron preparations within one hour of this medication.  Finish all this medication unless otherwise directed by prescriber.  May cause drowsiness or dizziness.  Medication should be taken with plenty of water.

## 2018-03-26 NOTE — PROGRESS NOTE ADULT - SUBJECTIVE AND OBJECTIVE BOX
Patient is a 67y old  Male who presents with a chief complaint of sob (26 Mar 2018 17:16)  NAD, NO SOB    INTERVAL HPI/OVERNIGHT EVENTS:  T(C): 36.8 (03-26-18 @ 11:53), Max: 37.4 (03-26-18 @ 04:20)  HR: 65 (03-26-18 @ 11:53) (55 - 65)  BP: 111/71 (03-26-18 @ 11:53) (99/62 - 121/70)  RR: 18 (03-26-18 @ 11:53) (18 - 18)  SpO2: 98% (03-26-18 @ 11:53) (94% - 98%)  Wt(kg): --  I&O's Summary    26 Mar 2018 07:01  -  26 Mar 2018 18:15  --------------------------------------------------------  IN: 240 mL / OUT: 0 mL / NET: 240 mL        LABS:                        11.1   6.57  )-----------( 270      ( 26 Mar 2018 06:59 )             33.7     03-26    137  |  99  |  13  ----------------------------<  96  4.1   |  26  |  0.84    Ca    9.6      26 Mar 2018 06:20    TPro  7.5  /  Alb  3.9  /  TBili  0.4  /  DBili  x   /  AST  17  /  ALT  15  /  AlkPhos  53  03-26    PT/INR - ( 26 Mar 2018 06:59 )   PT: 27.0 sec;   INR: 2.35 ratio         PTT - ( 25 Mar 2018 10:11 )  PTT:47.4 sec    CAPILLARY BLOOD GLUCOSE                MEDICATIONS  (STANDING):  aspirin  chewable 81 milliGRAM(s) Oral daily  atorvastatin 20 milliGRAM(s) Oral at bedtime  metoprolol tartrate 12.5 milliGRAM(s) Oral two times a day  tamsulosin 0.4 milliGRAM(s) Oral daily  valsartan 40 milliGRAM(s) Oral daily    MEDICATIONS  (PRN):  ondansetron Injectable 4 milliGRAM(s) IV Push every 6 hours PRN Nausea          PHYSICAL EXAM:  GENERAL: NAD, well-groomed, well-developed  HEAD:  Atraumatic, Normocephalic  CHEST/LUNG: Clear to percussion bilaterally; No rales, rhonchi, wheezing, or rubs  HEART: Regular rate and rhythm; No murmurs, rubs, or gallops  ABDOMEN: Soft, Nontender, Nondistended; Bowel sounds present  EXTREMITIES:  2+ Peripheral Pulses, No clubbing, cyanosis, or edema  LYMPH: No lymphadenopathy noted  SKIN: No rashes or lesions    Care Discussed with Consultants/Other Providers [ ] YES  [ ] NO

## 2018-03-26 NOTE — PROGRESS NOTE ADULT - ASSESSMENT
66yo M with SOB x2 days, worse on exertion, no orthopnea, no LE edema, started on lasix a few weeks ago 20mg qD, had SOB at that time and dx with rt pleural effusion. SOB improved transiently. no CP. hospitalized in december for lithotripsy. otherwise no trauma/sx/dvt/pe. on coumadin for A fib. +dry cough x a few weeks. no URI sx. no sick contacts. no fever/chills    Problem/Plan - 1:  ·  Problem: Pleural effusion.  Plan: unclear etiology  pulmonary consult appreciated  will follow recs.   thorocentesis after INR is less than 1.5    Problem/Plan - 2:  ·  Problem: Dyspnea on exertion.  Plan: fredrick sec to pleural effusion  cards and pulmonary fu      Problem/Plan - 3:  ·  Problem: CAD (coronary artery disease).  Plan: cards fu  cw home meds.

## 2018-03-26 NOTE — CONSULT NOTE ADULT - ASSESSMENT
67M Afib on AC, HTN, HLD, CAD s/p CABG, MVR presenting for progressive dyspnea on exertion in setting of a moderate right sided pleural effusion

## 2018-03-26 NOTE — DISCHARGE NOTE ADULT - HOSPITAL COURSE
67M Afib on AC, HTN, HLD, CAD s/p CABG, MVR presenting for progressive dyspnea on exertion in setting of a moderate right sided pleural effusion    Thoro by IR- 1 liter out -fluid sent off for analysis    Problem/Plan - 1:  ·  Problem: Dyspnea on exertion.  Plan: multifactorial-overwight, cardiac dz, effusion (restrictive dysfunction).     Problem/Plan - 2:  ·  Problem: Pleural effusion.  Plan: s/p thorocentesis by--radiology    send for -cyto, ph, cell count, chemistries, ph, cultures  CXR-no ptx.    Problem/Plan - 3:  ·  Problem: Coronary artery disease involving native heart without angina pectoris, unspecified vessel or lesion type.  Plan: as per Dalton et al.     Problem/Plan - 4:  ·  Problem: Chronic atrial fibrillation.  Plan: Robbin et al.     Problem/Plan - 5:  ·  Problem: Prophylactic measure.  Plan: GI prophylaxis:  PPI pre breakfast  aspiration precautions-all meals are to OOB as able.     Problem/Plan - 6:  Problem: Abnormal CT scan of lung. Plan: prior lung nodules--will review CT (old) compared to new-after fluid removed. 67M Afib on AC, HTN, HLD, CAD s/p CABG, MVR presenting for progressive dyspnea on exertion in setting of a moderate right sided pleural effusion    Thoro by IR- 1 liter out -fluid sent off for analysis    Problem/Plan - 1:  ·  Problem: Dyspnea on exertion.  Plan: multifactorial-overwight, cardiac dz, effusion (restrictive dysfunction).     Problem/Plan - 2:  ·  Problem: Pleural effusion.  Plan: s/p thorocentesis by--radiology    send for -cyto, ph, cell count, chemistries, ph, cultures  CXR-no ptx.    Problem/Plan - 3:  ·  Problem: Coronary artery disease involving native heart without angina pectoris, unspecified vessel or lesion type.  Plan: as per Dalton et al.     Problem/Plan - 4:  ·  Problem: Chronic atrial fibrillation.  Plan: Robbin et al.     Problem/Plan - 5:  ·  Problem: Prophylactic measure.  Plan: GI prophylaxis:  PPI pre breakfast  aspiration precautions-all meals are to OOB as able.     Problem/Plan - 6:  Problem: Abnormal CT scan of lung. Plan: prior lung nodules--will review CT (old) compared to new-after fluid removed.    Pt is stable for discharge today. Pt will follow-up with Pulmonologist  next week,  no earlier than Tuesday.

## 2018-03-26 NOTE — DISCHARGE NOTE ADULT - CARE PROVIDERS DIRECT ADDRESSES
,DirectAddress_Unknown ,DirectAddress_Unknown,[email or web address]

## 2018-03-26 NOTE — DISCHARGE NOTE ADULT - MEDICATION SUMMARY - MEDICATIONS TO TAKE
I will START or STAY ON the medications listed below when I get home from the hospital:    finasteride 5 mg oral tablet  -- 1 tab(s) by mouth once a day  -- Indication: For Urine retention    oxyCODONE-acetaminophen 5 mg-325 mg oral tablet  -- 2 tab(s) by mouth every 6 hours MDD:8  -- Caution federal law prohibits the transfer of this drug to any person other  than the person for whom it was prescribed.  May cause drowsiness.  Alcohol may intensify this effect.  Use care when operating dangerous machinery.  This prescription cannot be refilled.  This product contains acetaminophen.  Do not use  with any other product containing acetaminophen to prevent possible liver damage.  Using more of this medication than prescribed may cause serious breathing problems.    -- Indication: For Pain    Aspir 81  -- 1 tab(s) by mouth once a day  -- Indication: For Protect from clot formation    valsartan 40 mg oral tablet  -- 1 tab(s) by mouth once a day  -- Indication: For High blood pressure    Flomax 0.4 mg oral capsule  -- 1 cap(s) by mouth once a day  -- Indication: For urinary retention    warfarin 5 mg oral tablet  -- 1 tab(s) by mouth at bedtime  -- Indication: For H/O mitral valve replacement    atorvastatin 20 mg oral tablet  -- 1 tab(s) by mouth once a day (at bedtime)  -- Indication: For High cholesterol    Metoprolol Tartrate 25 mg oral tablet  -- 0.5 tab(s) by mouth 2 times a day  -- Indication: For High blood pressure    Lovaza 1000 mg oral capsule  -- 1 cap(s) by mouth once a day  -- Indication: For supplement    Fish Oil  -- 1 cap(s) by mouth once a day  -- Indication: For supplement    Foltanx  -- 1 tab(s) by mouth once a day  -- Indication: For supplement I will START or STAY ON the medications listed below when I get home from the hospital:    finasteride 5 mg oral tablet  -- 1 tab(s) by mouth once a day  -- Indication: For Urine retention    oxyCODONE-acetaminophen 5 mg-325 mg oral tablet  -- 2 tab(s) by mouth every 6 hours MDD:8  -- Caution federal law prohibits the transfer of this drug to any person other  than the person for whom it was prescribed.  May cause drowsiness.  Alcohol may intensify this effect.  Use care when operating dangerous machinery.  This prescription cannot be refilled.  This product contains acetaminophen.  Do not use  with any other product containing acetaminophen to prevent possible liver damage.  Using more of this medication than prescribed may cause serious breathing problems.    -- Indication: For Pain    Aspir 81  -- 1 tab(s) by mouth once a day  -- Indication: For Protect from clot formation    valsartan 40 mg oral tablet  -- 1 tab(s) by mouth once a day  -- Indication: For High blood pressure    Flomax 0.4 mg oral capsule  -- 1 cap(s) by mouth once a day  -- Indication: For urinary retention    warfarin 5 mg oral tablet  -- 1 tab(s) by mouth at bedtime  -- Indication: For H/O mitral valve replacement    atorvastatin 20 mg oral tablet  -- 1 tab(s) by mouth once a day (at bedtime)  -- Indication: For High cholesterol    Metoprolol Tartrate 25 mg oral tablet  -- 0.5 tab(s) by mouth 2 times a day  -- Indication: For High blood pressure    Lasix 20 mg oral tablet  -- 1 tab(s) by mouth once a day  -- Indication: For Fluid overload    Fish Oil  -- 1 cap(s) by mouth once a day  -- Indication: For supplement    Lovaza 1000 mg oral capsule  -- 1 cap(s) by mouth once a day  -- Indication: For supplement    Foltanx  -- 1 tab(s) by mouth once a day  -- Indication: For supplement

## 2018-03-26 NOTE — DISCHARGE NOTE ADULT - PLAN OF CARE
Improved You had a right thoracentesis of 1 liter pleural fluid.  Follow-up with Dr. Flynn , Pulmonologist next week no earlier than Tuesday. Call for appointment. Continue with medication(s) as prescribed by your doctor.  Follow-up with your primary care physician and cardiologist. Call for appointment.  This medication is used to thin the blood, to prevent and treat blood clots.  Take this medication Daily as prescribed by your Health Care Provider.  Tell your Dentist, Surgeon, and other Doctors that you are on this drug.  This medication requires PT/INR Blood work monitoring,  Please see below for further Coumadin Instructions Atrial fibrillation is the most common heart rhythm problem & has the risk of stroke & heart attack  It helps if you control your blood pressure, not drink more than 1-2 alcohol drinks per day, cut down on caffeine, getting treatment for over active thyroid gland, & getting exercise  Call your doctor if you feel your heart racing or beating unusually, chest tightness or pain, lightheaded, faint, shortness of breath especially with exercise  It is important to take your heart medication as prescribed  You may be on anticoagulation which is very important to take as directed - you may need blood work to monitor drug levels Low salt diet  Activity as tolerated.  Take all medication as prescribed.  Follow up with your medical doctor for routine blood pressure monitoring at your next visit.  Notify your doctor if you have any of the following symptoms:   Dizziness, Lightheadedness, Blurry vision, Headache, Chest pain, Shortness of breath

## 2018-03-26 NOTE — DISCHARGE NOTE ADULT - CARE PROVIDER_API CALL
Paidoussis, Edmond WATKINS), Family Medicine  [address] [place] [address]  Phone: [phone]  Fax: [phone] Paidoussis, Edmond WATKINS), Family Medicine  [address] [place] [address]  Phone: [phone]  Fax: [phone]    Chirag Flynn (MD), Internal Medicine; Pulmonary Disease  [address] [facility] [address] [facility]  [address]  [place] [address]  Phone: [phone]  Fax: [phone]    Madan Hoffman), Cardiovascular Disease  [address] [place] [address]  Phone: [phone]  Fax: [phone]

## 2018-03-26 NOTE — DISCHARGE NOTE ADULT - CARE PLAN
Principal Discharge DX:	Dyspnea on exertion  Secondary Diagnosis:	Pleural effusion  Secondary Diagnosis:	H/O mitral valve replacement Principal Discharge DX:	Dyspnea on exertion  Goal:	Improved  Assessment and plan of treatment:	You had a right thoracentesis of 1 liter pleural fluid.  Follow-up with Dr. Flynn , Pulmonologist next week no earlier than Tuesday. Call for appointment.  Secondary Diagnosis:	Pleural effusion  Assessment and plan of treatment:	You had a right thoracentesis of 1 liter pleural fluid.  Follow-up with Dr. Flynn , Pulmonologist next week no earlier than Tuesday. Call for appointment.  Secondary Diagnosis:	H/O mitral valve replacement  Assessment and plan of treatment:	Continue with medication(s) as prescribed by your doctor.  Follow-up with your primary care physician and cardiologist. Call for appointment.  This medication is used to thin the blood, to prevent and treat blood clots.  Take this medication Daily as prescribed by your Health Care Provider.  Tell your Dentist, Surgeon, and other Doctors that you are on this drug.  This medication requires PT/INR Blood work monitoring,  Please see below for further Coumadin Instructions Principal Discharge DX:	Dyspnea on exertion  Goal:	Improved  Assessment and plan of treatment:	You had a right thoracentesis of 1 liter pleural fluid.  Follow-up with Dr. Flynn , Pulmonologist next week no earlier than Tuesday. Call for appointment.  Secondary Diagnosis:	Pleural effusion  Assessment and plan of treatment:	You had a right thoracentesis of 1 liter pleural fluid.  Follow-up with Dr. Flynn , Pulmonologist next week no earlier than Tuesday. Call for appointment.  Secondary Diagnosis:	H/O mitral valve replacement  Assessment and plan of treatment:	Continue with medication(s) as prescribed by your doctor.  Follow-up with your primary care physician and cardiologist. Call for appointment.  This medication is used to thin the blood, to prevent and treat blood clots.  Take this medication Daily as prescribed by your Health Care Provider.  Tell your Dentist, Surgeon, and other Doctors that you are on this drug.  This medication requires PT/INR Blood work monitoring,  Please see below for further Coumadin Instructions  Secondary Diagnosis:	Chronic atrial fibrillation  Assessment and plan of treatment:	Atrial fibrillation is the most common heart rhythm problem & has the risk of stroke & heart attack  It helps if you control your blood pressure, not drink more than 1-2 alcohol drinks per day, cut down on caffeine, getting treatment for over active thyroid gland, & getting exercise  Call your doctor if you feel your heart racing or beating unusually, chest tightness or pain, lightheaded, faint, shortness of breath especially with exercise  It is important to take your heart medication as prescribed  You may be on anticoagulation which is very important to take as directed - you may need blood work to monitor drug levels  Secondary Diagnosis:	Hypertension  Assessment and plan of treatment:	Low salt diet  Activity as tolerated.  Take all medication as prescribed.  Follow up with your medical doctor for routine blood pressure monitoring at your next visit.  Notify your doctor if you have any of the following symptoms:   Dizziness, Lightheadedness, Blurry vision, Headache, Chest pain, Shortness of breath

## 2018-03-26 NOTE — DISCHARGE NOTE ADULT - PATIENT PORTAL LINK FT
You can access the [facility] Portal, offered by [facility], by registering with the following website: [email or web address]

## 2018-03-26 NOTE — DISCHARGE NOTE ADULT - NS AS DC FOLLOWUP STROKE INST
Influenza vaccination (VIS Pub Date: August 7, 2015)/Coumadin/Warfarin/Smoking Cessation Coumadin/Warfarin

## 2018-03-26 NOTE — CONSULT NOTE ADULT - ATTENDING COMMENTS
Dr. Flynn to follow.    If Mr. Campos decides to have this procedure (thoracentesis) done as an outpatient then he will need to call Dr. Flynn's office at [phone] to arrange for outpatient followup, labwork, and thoracentesis. I spoke with Mr. Campos and his son/physician today. They prefer to proceed with the thoracentesis as an inpatient. Therefore, please give Vitamin K mg PO today and repeat INR in the AM. Ultrasound can be reached at [phone] and they want an INR < 1.5. They asked for the order to be placed in [place] once labs are appropriate.    Dr. Flynn to follow.

## 2018-03-27 DIAGNOSIS — R91.8 OTHER NONSPECIFIC ABNORMAL FINDING OF LUNG FIELD: ICD-10-CM

## 2018-03-27 LAB
ANION GAP SERPL CALC-SCNC: 13 MMOL/L — SIGNIFICANT CHANGE UP (ref 5–17)
BUN SERPL-MCNC: 16 MG/DL — SIGNIFICANT CHANGE UP (ref 7–23)
CALCIUM SERPL-MCNC: 9.6 MG/DL — SIGNIFICANT CHANGE UP (ref 8.4–10.5)
CHLORIDE SERPL-SCNC: 101 MMOL/L — SIGNIFICANT CHANGE UP (ref 96–108)
CO2 SERPL-SCNC: 27 MMOL/L — SIGNIFICANT CHANGE UP (ref 22–31)
CREAT SERPL-MCNC: 0.84 MG/DL — SIGNIFICANT CHANGE UP (ref 0.5–1.3)
GLUCOSE SERPL-MCNC: 94 MG/DL — SIGNIFICANT CHANGE UP (ref 70–99)
INR BLD: 1.79 RATIO — HIGH (ref 0.88–1.16)
MAGNESIUM SERPL-MCNC: 2 MG/DL — SIGNIFICANT CHANGE UP (ref 1.6–2.6)
POTASSIUM SERPL-MCNC: 4.1 MMOL/L — SIGNIFICANT CHANGE UP (ref 3.5–5.3)
POTASSIUM SERPL-SCNC: 4.1 MMOL/L — SIGNIFICANT CHANGE UP (ref 3.5–5.3)
PROTHROM AB SERPL-ACNC: 20.5 SEC — HIGH (ref 10–13.1)
SODIUM SERPL-SCNC: 141 MMOL/L — SIGNIFICANT CHANGE UP (ref 135–145)

## 2018-03-27 PROCEDURE — 99233 SBSQ HOSP IP/OBS HIGH 50: CPT

## 2018-03-27 RX ORDER — PHYTONADIONE (VIT K1) 5 MG
2.5 TABLET ORAL ONCE
Qty: 0 | Refills: 0 | Status: COMPLETED | OUTPATIENT
Start: 2018-03-27 | End: 2018-03-27

## 2018-03-27 RX ORDER — FINASTERIDE 5 MG/1
5 TABLET, FILM COATED ORAL DAILY
Qty: 0 | Refills: 0 | Status: DISCONTINUED | OUTPATIENT
Start: 2018-03-27 | End: 2018-03-29

## 2018-03-27 RX ADMIN — Medication 12.5 MILLIGRAM(S): at 09:15

## 2018-03-27 RX ADMIN — TAMSULOSIN HYDROCHLORIDE 0.4 MILLIGRAM(S): 0.4 CAPSULE ORAL at 11:25

## 2018-03-27 RX ADMIN — Medication 12.5 MILLIGRAM(S): at 22:43

## 2018-03-27 RX ADMIN — Medication 2.5 MILLIGRAM(S): at 11:25

## 2018-03-27 RX ADMIN — VALSARTAN 40 MILLIGRAM(S): 80 TABLET ORAL at 09:15

## 2018-03-27 RX ADMIN — ATORVASTATIN CALCIUM 20 MILLIGRAM(S): 80 TABLET, FILM COATED ORAL at 22:43

## 2018-03-27 RX ADMIN — FINASTERIDE 5 MILLIGRAM(S): 5 TABLET, FILM COATED ORAL at 22:42

## 2018-03-27 RX ADMIN — Medication 81 MILLIGRAM(S): at 11:25

## 2018-03-27 NOTE — PROGRESS NOTE ADULT - PROBLEM SELECTOR PLAN 2
for thorocentesis once INR is below 1.5--radiology to do thoro  will send for all-cyto, ph, cell count, chemistries, ph, cultures

## 2018-03-27 NOTE — PROGRESS NOTE ADULT - SUBJECTIVE AND OBJECTIVE BOX
Patient is a 67y old  Male who presents with a chief complaint of sob (26 Mar 2018 17:16)      INTERVAL HPI/OVERNIGHT EVENTS:  T(C): 36.7 (03-27-18 @ 14:07), Max: 36.9 (03-26-18 @ 20:28)  HR: 57 (03-27-18 @ 14:07) (57 - 61)  BP: 100/64 (03-27-18 @ 14:07) (100/64 - 114/66)  RR: 18 (03-27-18 @ 14:07) (18 - 18)  SpO2: 95% (03-27-18 @ 14:07) (94% - 98%)  Wt(kg): --  I&O's Summary    26 Mar 2018 07:01  -  27 Mar 2018 07:00  --------------------------------------------------------  IN: 530 mL / OUT: 0 mL / NET: 530 mL    27 Mar 2018 07:01  -  27 Mar 2018 20:17  --------------------------------------------------------  IN: 1320 mL / OUT: 0 mL / NET: 1320 mL        LABS:                        11.1   6.57  )-----------( 270      ( 26 Mar 2018 06:59 )             33.7     03-27    141  |  101  |  16  ----------------------------<  94  4.1   |  27  |  0.84    Ca    9.6      27 Mar 2018 06:37  Mg     2.0     03-27    TPro  7.5  /  Alb  3.9  /  TBili  0.4  /  DBili  x   /  AST  17  /  ALT  15  /  AlkPhos  53  03-26    PT/INR - ( 27 Mar 2018 07:28 )   PT: 20.5 sec;   INR: 1.79 ratio             CAPILLARY BLOOD GLUCOSE                MEDICATIONS  (STANDING):  aspirin  chewable 81 milliGRAM(s) Oral daily  atorvastatin 20 milliGRAM(s) Oral at bedtime  finasteride 5 milliGRAM(s) Oral daily  metoprolol tartrate 12.5 milliGRAM(s) Oral two times a day  tamsulosin 0.4 milliGRAM(s) Oral daily  valsartan 40 milliGRAM(s) Oral daily    MEDICATIONS  (PRN):  ondansetron Injectable 4 milliGRAM(s) IV Push every 6 hours PRN Nausea          PHYSICAL EXAM:  GENERAL: NAD, well-groomed, well-developed  HEAD:  Atraumatic, Normocephalic  CHEST/LUNG: Clear to percussion bilaterally; No rales, rhonchi, wheezing, or rubs  HEART: Regular rate and rhythm; No murmurs, rubs, or gallops  ABDOMEN: Soft, Nontender, Nondistended; Bowel sounds present  EXTREMITIES:  2+ Peripheral Pulses, No clubbing, cyanosis, or edema  LYMPH: No lymphadenopathy noted  SKIN: No rashes or lesions    Care Discussed with Consultants/Other Providers [+ ] YES  [ ] NO

## 2018-03-27 NOTE — PROGRESS NOTE ADULT - ATTENDING COMMENTS
I spoke with Mr. Campos and his son/physician today. They prefer to proceed with the thoracentesis as an inpatient. Therefore, please give Vitamin K mg PO today and repeat INR in the AM. Ultrasound can be reached at [phone] and they want an INR < 1.5. They asked for the order to be placed in [place] once labs are appropriate.    Dr. Flynn to follow. as above-  To proceed with the thoracentesis as an inpatient. Therefore, please give Vitamin K mg PO today and repeat INR in the AM. Ultrasound can be reached at [phone] and they want an INR < 1.5. They asked for the order to be placed in [place] once labs are appropriate.    Out pt films-old CT to be reviewed and compared to new films-may need repeat CT after fluid removal.    Chirag Flynn MD-Pulmonary   [phone]

## 2018-03-27 NOTE — PROGRESS NOTE ADULT - ASSESSMENT
68yo M with SOB x2 days, worse on exertion, no orthopnea, no LE edema, started on lasix a few weeks ago 20mg qD, had SOB at that time and dx with rt pleural effusion. SOB improved transiently. no CP. hospitalized in december for lithotripsy. otherwise no trauma/sx/dvt/pe. on coumadin for A fib. +dry cough x a few weeks. no URI sx. no sick contacts. no fever/chills    Problem/Plan - 1:  ·  Problem: Pleural effusion.  Plan: unclear etiology  pulmonary consult appreciated  will follow recs.   thorocentesis after INR is less than 1.5    Problem/Plan - 2:  ·  Problem: Dyspnea on exertion.  Plan: fredrick sec to pleural effusion  cards and pulmonary fu      Problem/Plan - 3:  ·  Problem: CAD (coronary artery disease).  Plan: cards fu

## 2018-03-27 NOTE — PROGRESS NOTE ADULT - SUBJECTIVE AND OBJECTIVE BOX
CHIEF COMPLAINT:    Interval Events:    REVIEW OF SYSTEMS:  Constitutional: No fevers or chills. No weight loss. No fatigue or generalized malaise.  Eyes: No itching or discharge from the eyes  ENT: No ear pain. No ear discharge. No nasal congestion. No post nasal drip. No epistaxis. No throat pain. No sore throat. No difficulty swallowing.   CV: No chest pain. No palpitations. No lightheadedness or dizziness.   Resp: No dyspnea at rest. No dyspnea on exertion. No orthopnea. No wheezing. No cough. No stridor. No sputum production. No chest pain with respiration.  GI: No nausea. No vomiting. No diarrhea.  MSK: No joint pain or pain in any extremities  Integumentary: No skin lesions. No pedal edema.  Neurological: No gross motor weakness. No sensory changes.  [ ] All other systems negative  [ ] Unable to assess ROS because ________    OBJECTIVE:  ICU Vital Signs Last 24 Hrs  T(C): 36.7 (27 Mar 2018 04:24), Max: 36.9 (26 Mar 2018 20:28)  T(F): 98 (27 Mar 2018 04:24), Max: 98.4 (26 Mar 2018 20:28)  HR: 61 (27 Mar 2018 04:24) (58 - 65)  BP: 114/66 (27 Mar 2018 04:24) (110/67 - 114/66)  BP(mean): --  ABP: --  ABP(mean): --  RR: 18 (27 Mar 2018 04:24) (18 - 18)  SpO2: 94% (27 Mar 2018 04:24) (94% - 98%)        03-26 @ 07:01  -  03-27 @ 04:59  --------------------------------------------------------  IN: 480 mL / OUT: 0 mL / NET: 480 mL      CAPILLARY BLOOD GLUCOSE          PHYSICAL EXAM:  General: Awake, alert, oriented X 3.   HEENT: Atraumatic, normocephalic.                 Mallampatti Grade                 No nasal congestion.                No tonsillar or pharyngeal exudates.  Lymph Nodes: No palpable lymphadenopathy  Neck: No JVD. No carotid bruit.   Respiratory: Normal chest expansion                         Normal percussion                         Normal and equal air entry                         No wheeze, rhonchi or rales.  Cardiovascular: S1 S2 normal. No murmurs, rubs or gallops.   Abdomen: Soft, non-tender, non-distended. No organomegaly.  Extremities: Warm to touch. Peripheral pulse palpable. No pedal edema.   Skin: No rashes or skin lesions  Neurological: Motor and sensory examination equal and normal in all four extremities.  Psychiatry: Appropriate mood and affect.    HOSPITAL MEDICATIONS:  MEDICATIONS  (STANDING):  aspirin  chewable 81 milliGRAM(s) Oral daily  atorvastatin 20 milliGRAM(s) Oral at bedtime  metoprolol tartrate 12.5 milliGRAM(s) Oral two times a day  tamsulosin 0.4 milliGRAM(s) Oral daily  valsartan 40 milliGRAM(s) Oral daily    MEDICATIONS  (PRN):  ondansetron Injectable 4 milliGRAM(s) IV Push every 6 hours PRN Nausea      LABS:                        11.1   6.57  )-----------( 270      ( 26 Mar 2018 06:59 )             33.7     03-26    137  |  99  |  13  ----------------------------<  96  4.1   |  26  |  0.84    Ca    9.6      26 Mar 2018 06:20    TPro  7.5  /  Alb  3.9  /  TBili  0.4  /  DBili  x   /  AST  17  /  ALT  15  /  AlkPhos  53  03-26    PT/INR - ( 26 Mar 2018 06:59 )   PT: 27.0 sec;   INR: 2.35 ratio         PTT - ( 25 Mar 2018 10:11 )  PTT:47.4 sec      Venous Blood Gas:  03-25 @ 10:10  7.37/52/32/30/55  VBG Lactate: 1.0      MICROBIOLOGY:     RADIOLOGY:  [ ] Reviewed and interpreted by me    Point of Care Ultrasound Findings:    PFT:    EKG: CHIEF COMPLAINT:  BROOKS, talk sob and cough w/ sob and exerting    Interval Events: correcting INR    REVIEW OF SYSTEMS:  Constitutional: No fevers or chills. No weight loss. No fatigue or generalized malaise.  Eyes: No itching or discharge from the eyes  ENT: No ear pain. No ear discharge. No nasal congestion. No post nasal drip. No epistaxis. No throat pain. No sore throat. No difficulty swallowing.   CV: No chest pain. No palpitations. No lightheadedness or dizziness.   Resp: No dyspnea at rest. + dyspnea on exertion. No orthopnea. No wheezing. ++ cough. No stridor. No sputum production. No chest pain with respiration.  GI: No nausea. No vomiting. No diarrhea.  MSK: No joint pain or pain in any extremities  Integumentary: No skin lesions. No pedal edema.  Neurological: No gross motor weakness. No sensory changes.  [+ ] All other systems negative  [ ] Unable to assess ROS because ________    OBJECTIVE:  ICU Vital Signs Last 24 Hrs  T(C): 36.7 (27 Mar 2018 04:24), Max: 36.9 (26 Mar 2018 20:28)  T(F): 98 (27 Mar 2018 04:24), Max: 98.4 (26 Mar 2018 20:28)  HR: 61 (27 Mar 2018 04:24) (58 - 65)  BP: 114/66 (27 Mar 2018 04:24) (110/67 - 114/66)  BP(mean): --  ABP: --  ABP(mean): --  RR: 18 (27 Mar 2018 04:24) (18 - 18)  SpO2: 94% (27 Mar 2018 04:24) (94% - 98%)        03-26 @ 07:01  -  03-27 @ 04:59  --------------------------------------------------------  IN: 480 mL / OUT: 0 mL / NET: 480 mL      CAPILLARY BLOOD GLUCOSE          PHYSICAL EXAM: NAD in bed  General: Awake, alert, oriented X 3.   HEENT: Atraumatic, normocephalic.                 Mallampatti Grade 3                No nasal congestion.                No tonsillar or pharyngeal exudates.  Lymph Nodes: No palpable lymphadenopathy  Neck: No JVD. No carotid bruit.   Respiratory: Normal chest expansion                         dullness to  percussion-right base                         Normal and equal air entry                         No wheeze, rhonchi or rales. reduced BS right base-1/3 up  Cardiovascular: S1 S2 normal. No murmurs, rubs or gallops.   Abdomen: Soft, non-tender, non-distended. No organomegaly.  Extremities: Warm to touch. Peripheral pulse palpable. No pedal edema.   Skin: No rashes or skin lesions  Neurological: Motor and sensory examination equal and normal in all four extremities.  Psychiatry: Appropriate mood and affect.    HOSPITAL MEDICATIONS:  MEDICATIONS  (STANDING):  aspirin  chewable 81 milliGRAM(s) Oral daily  atorvastatin 20 milliGRAM(s) Oral at bedtime  metoprolol tartrate 12.5 milliGRAM(s) Oral two times a day  tamsulosin 0.4 milliGRAM(s) Oral daily  valsartan 40 milliGRAM(s) Oral daily    MEDICATIONS  (PRN):  ondansetron Injectable 4 milliGRAM(s) IV Push every 6 hours PRN Nausea      LABS:                        11.1   6.57  )-----------( 270      ( 26 Mar 2018 06:59 )             33.7     03-26    137  |  99  |  13  ----------------------------<  96  4.1   |  26  |  0.84    Ca    9.6      26 Mar 2018 06:20    TPro  7.5  /  Alb  3.9  /  TBili  0.4  /  DBili  x   /  AST  17  /  ALT  15  /  AlkPhos  53  03-26    PT/INR - ( 26 Mar 2018 06:59 )   PT: 27.0 sec;   INR: 2.35 ratio         PTT - ( 25 Mar 2018 10:11 )  PTT:47.4 sec      Venous Blood Gas:  03-25 @ 10:10  7.37/52/32/30/55  VBG Lactate: 1.0      MICROBIOLOGY:     RADIOLOGY:  [ ] Reviewed and interpreted by me    Point of Care Ultrasound Findings:    PFT:    EKG:

## 2018-03-28 ENCOUNTER — RESULT REVIEW (OUTPATIENT)
Age: 68
End: 2018-03-28

## 2018-03-28 LAB
B PERT IGG+IGM PNL SER: ABNORMAL
COLOR FLD: SIGNIFICANT CHANGE UP
CREAT FLD-MCNC: 0.85 MG/DL — SIGNIFICANT CHANGE UP
EOSINOPHIL # FLD: 16 % — SIGNIFICANT CHANGE UP
FLUID INTAKE SUBSTANCE CLASS: SIGNIFICANT CHANGE UP
FLUID SEGMENTED GRANULOCYTES: 8 % — SIGNIFICANT CHANGE UP
GRAM STN FLD: SIGNIFICANT CHANGE UP
HCT VFR BLD CALC: 33.9 % — LOW (ref 39–50)
HGB BLD-MCNC: 11.2 G/DL — LOW (ref 13–17)
INR BLD: 1.38 RATIO — HIGH (ref 0.88–1.16)
LYMPHOCYTES # FLD: 65 % — SIGNIFICANT CHANGE UP
MCHC RBC-ENTMCNC: 28.9 PG — SIGNIFICANT CHANGE UP (ref 27–34)
MCHC RBC-ENTMCNC: 33 GM/DL — SIGNIFICANT CHANGE UP (ref 32–36)
MCV RBC AUTO: 87.6 FL — SIGNIFICANT CHANGE UP (ref 80–100)
MESOTHL CELL # FLD: 1 % — SIGNIFICANT CHANGE UP
MONOS+MACROS # FLD: 10 % — SIGNIFICANT CHANGE UP
NRBC # BLD: 0 /100 WBCS — SIGNIFICANT CHANGE UP (ref 0–0)
PLATELET # BLD AUTO: 261 K/UL — SIGNIFICANT CHANGE UP (ref 150–400)
PROTHROM AB SERPL-ACNC: 15.7 SEC — HIGH (ref 10–13.1)
RBC # BLD: 3.87 M/UL — LOW (ref 4.2–5.8)
RBC # FLD: 13.3 % — SIGNIFICANT CHANGE UP (ref 10.3–14.5)
RCV VOL RI: HIGH /UL (ref 0–5)
SPECIMEN SOURCE: SIGNIFICANT CHANGE UP
TOTAL NUCLEATED CELL COUNT, BODY FLUID: 2866 /UL — HIGH (ref 0–5)
TUBE TYPE: SIGNIFICANT CHANGE UP
WBC # BLD: 7.32 K/UL — SIGNIFICANT CHANGE UP (ref 3.8–10.5)
WBC # FLD AUTO: 7.32 K/UL — SIGNIFICANT CHANGE UP (ref 3.8–10.5)

## 2018-03-28 PROCEDURE — 32555 ASPIRATE PLEURA W/ IMAGING: CPT | Mod: RT

## 2018-03-28 PROCEDURE — 71045 X-RAY EXAM CHEST 1 VIEW: CPT | Mod: 26

## 2018-03-28 PROCEDURE — 99232 SBSQ HOSP IP/OBS MODERATE 35: CPT

## 2018-03-28 PROCEDURE — 88305 TISSUE EXAM BY PATHOLOGIST: CPT | Mod: 26

## 2018-03-28 PROCEDURE — 88112 CYTOPATH CELL ENHANCE TECH: CPT | Mod: 26

## 2018-03-28 RX ORDER — WARFARIN SODIUM 2.5 MG/1
5 TABLET ORAL ONCE
Qty: 0 | Refills: 0 | Status: COMPLETED | OUTPATIENT
Start: 2018-03-28 | End: 2018-03-28

## 2018-03-28 RX ADMIN — WARFARIN SODIUM 5 MILLIGRAM(S): 2.5 TABLET ORAL at 21:41

## 2018-03-28 RX ADMIN — Medication 81 MILLIGRAM(S): at 09:51

## 2018-03-28 RX ADMIN — ATORVASTATIN CALCIUM 20 MILLIGRAM(S): 80 TABLET, FILM COATED ORAL at 21:41

## 2018-03-28 RX ADMIN — FINASTERIDE 5 MILLIGRAM(S): 5 TABLET, FILM COATED ORAL at 09:51

## 2018-03-28 RX ADMIN — Medication 12.5 MILLIGRAM(S): at 21:41

## 2018-03-28 RX ADMIN — TAMSULOSIN HYDROCHLORIDE 0.4 MILLIGRAM(S): 0.4 CAPSULE ORAL at 09:51

## 2018-03-28 RX ADMIN — VALSARTAN 40 MILLIGRAM(S): 80 TABLET ORAL at 09:48

## 2018-03-28 RX ADMIN — Medication 12.5 MILLIGRAM(S): at 09:50

## 2018-03-28 NOTE — PROGRESS NOTE ADULT - SUBJECTIVE AND OBJECTIVE BOX
Patient is a 67y old  Male who presents with a chief complaint of sob (26 Mar 2018 17:16)      INTERVAL HPI/OVERNIGHT EVENTS:  T(C): 36.9 (03-28-18 @ 04:30), Max: 36.9 (03-27-18 @ 20:07)  HR: 61 (03-28-18 @ 09:46) (61 - 66)  BP: 121/76 (03-28-18 @ 09:46) (112/69 - 121/76)  RR: 18 (03-28-18 @ 04:30) (18 - 18)  SpO2: 94% (03-28-18 @ 04:30) (94% - 96%)  Wt(kg): --  I&O's Summary    27 Mar 2018 07:01  -  28 Mar 2018 07:00  --------------------------------------------------------  IN: 1370 mL / OUT: 0 mL / NET: 1370 mL    28 Mar 2018 07:01  -  28 Mar 2018 19:30  --------------------------------------------------------  IN: 360 mL / OUT: 0 mL / NET: 360 mL        LABS:                        11.2   7.32  )-----------( 261      ( 28 Mar 2018 07:31 )             33.9     03-27    141  |  101  |  16  ----------------------------<  94  4.1   |  27  |  0.84    Ca    9.6      27 Mar 2018 06:37  Mg     2.0     03-27      PT/INR - ( 28 Mar 2018 07:32 )   PT: 15.7 sec;   INR: 1.38 ratio             CAPILLARY BLOOD GLUCOSE                MEDICATIONS  (STANDING):  aspirin  chewable 81 milliGRAM(s) Oral daily  atorvastatin 20 milliGRAM(s) Oral at bedtime  finasteride 5 milliGRAM(s) Oral daily  metoprolol tartrate 12.5 milliGRAM(s) Oral two times a day  tamsulosin 0.4 milliGRAM(s) Oral daily  valsartan 40 milliGRAM(s) Oral daily  warfarin 5 milliGRAM(s) Oral once    MEDICATIONS  (PRN):  ondansetron Injectable 4 milliGRAM(s) IV Push every 6 hours PRN Nausea          PHYSICAL EXAM:  GENERAL: NAD, well-groomed, well-developed  HEAD:  Atraumatic, Normocephalic  CHEST/LUNG: Clear to percussion bilaterally; No rales, rhonchi, wheezing, or rubs  HEART: Regular rate and rhythm; No murmurs, rubs, or gallops  ABDOMEN: Soft, Nontender, Nondistended; Bowel sounds present  EXTREMITIES:  2+ Peripheral Pulses, No clubbing, cyanosis, or edema  LYMPH: No lymphadenopathy noted  SKIN: No rashes or lesions    Care Discussed with Consultants/Other Providers [ ] YES  [ ] NO

## 2018-03-28 NOTE — PROGRESS NOTE ADULT - ATTENDING COMMENTS
as above-  To proceed with the thoracentesis as an inpatient. Therefore, please give Vitamin K mg PO today and repeat INR in the AM. Ultrasound can be reached at [phone] and they want an INR < 1.5. They asked for the order to be placed in [place] once labs are appropriate.    Out pt films-old CT to be reviewed and compared to new films-may need repeat CT after fluid removal.    Chirag Flynn MD-Pulmonary   [phone] as above-  To proceed with the thoracentesis as an inpatient. s/p Vitamin K mg PO 3/27 await repeat INR in the AM.   Ultrasound can be reached at [phone] and they want an INR < 1.5. They asked for the order to be placed in [place] once labs are appropriate.  After thoro can re-initiate AC and likely set up for DC    Out pt films-old CT to be reviewed and compared to new films-may need repeat CT after fluid removal.    Chirag Flynn MD-Pulmonary   [phone]

## 2018-03-28 NOTE — PROGRESS NOTE ADULT - SUBJECTIVE AND OBJECTIVE BOX
Interventional Radiology Brief Procedure Note    Procedure: Right thoracentesis    Operators: Susu Carlson    Anesthesia (type): 1 % lidocaine local    Contrast: None    EBL: Minimal    Findings/Follow up Plan of Care: Under ultrasound guidance, 5 FR sheathed needle advanced into right pleural effusion. 1 L dark red fluid aspirated and sent for laboratory analysis. No immediate complications.    Specimens Removed: 1 L dark red fluid    Implants: None    Complications: None    Condition/Disposition: Stable/For chest X-ray    Please call Interventional Radiology x [phone] with any questions, concerns, or issues. Radiology Brief Procedure Note    Procedure: Right thoracentesis    Operators: Susu Carlson    Anesthesia (type): 1 % lidocaine local    Contrast: None    EBL: Minimal    Findings/Follow up Plan of Care: Under ultrasound guidance, 5 FR sheathed needle advanced into right pleural effusion. 1 L dark red fluid aspirated and sent for laboratory analysis. No immediate complications.    Specimens Removed: 1 L dark red fluid    Implants: None    Complications: None    Condition/Disposition: Stable/For chest X-ray    Please call Interventional Radiology x [phone] with any questions, concerns, or issues.

## 2018-03-28 NOTE — PROGRESS NOTE ADULT - ASSESSMENT
67M Afib on AC, HTN, HLD, CAD s/p CABG, MVR presenting for progressive dyspnea on exertion in setting of a moderate right sided pleural effusion 67M Afib on AC, HTN, HLD, CAD s/p CABG, MVR presenting for progressive dyspnea on exertion in setting of a moderate right sided pleural effusion    INR likely to be in acceptable range today for thoro by IR

## 2018-03-28 NOTE — PROGRESS NOTE ADULT - PROBLEM SELECTOR PLAN 2
for thorocentesis once INR is below 1.5--radiology to do thoro  will send for all-cyto, ph, cell count, chemistries, ph, cultures for thorocentesis once INR is below 1.5--radiology to do thoro  will send for all-cyto, ph, cell count, chemistries, ph, cultures--contact IR today to perform

## 2018-03-28 NOTE — PROGRESS NOTE ADULT - SUBJECTIVE AND OBJECTIVE BOX
CHIEF COMPLAINT:    Interval Events:    REVIEW OF SYSTEMS:  Constitutional: No fevers or chills. No weight loss. No fatigue or generalized malaise.  Eyes: No itching or discharge from the eyes  ENT: No ear pain. No ear discharge. No nasal congestion. No post nasal drip. No epistaxis. No throat pain. No sore throat. No difficulty swallowing.   CV: No chest pain. No palpitations. No lightheadedness or dizziness.   Resp: No dyspnea at rest. No dyspnea on exertion. No orthopnea. No wheezing. No cough. No stridor. No sputum production. No chest pain with respiration.  GI: No nausea. No vomiting. No diarrhea.  MSK: No joint pain or pain in any extremities  Integumentary: No skin lesions. No pedal edema.  Neurological: No gross motor weakness. No sensory changes.  [ ] All other systems negative  [ ] Unable to assess ROS because ________    OBJECTIVE:  ICU Vital Signs Last 24 Hrs  T(C): 36.9 (28 Mar 2018 04:30), Max: 36.9 (27 Mar 2018 20:07)  T(F): 98.5 (28 Mar 2018 04:30), Max: 98.5 (27 Mar 2018 20:07)  HR: 66 (28 Mar 2018 04:30) (57 - 66)  BP: 117/74 (28 Mar 2018 04:30) (100/64 - 117/74)  BP(mean): --  ABP: --  ABP(mean): --  RR: 18 (28 Mar 2018 04:30) (18 - 18)  SpO2: 94% (28 Mar 2018 04:30) (94% - 96%)        03-26 @ 07:01 - 03-27 @ 07:00  --------------------------------------------------------  IN: 530 mL / OUT: 0 mL / NET: 530 mL    03-27 @ 07:01 - 03-28 @ 05:05  --------------------------------------------------------  IN: 1320 mL / OUT: 0 mL / NET: 1320 mL      CAPILLARY BLOOD GLUCOSE          PHYSICAL EXAM:  General: Awake, alert, oriented X 3.   HEENT: Atraumatic, normocephalic.                 Mallampatti Grade                 No nasal congestion.                No tonsillar or pharyngeal exudates.  Lymph Nodes: No palpable lymphadenopathy  Neck: No JVD. No carotid bruit.   Respiratory: Normal chest expansion                         Normal percussion                         Normal and equal air entry                         No wheeze, rhonchi or rales.  Cardiovascular: S1 S2 normal. No murmurs, rubs or gallops.   Abdomen: Soft, non-tender, non-distended. No organomegaly.  Extremities: Warm to touch. Peripheral pulse palpable. No pedal edema.   Skin: No rashes or skin lesions  Neurological: Motor and sensory examination equal and normal in all four extremities.  Psychiatry: Appropriate mood and affect.    HOSPITAL MEDICATIONS:  MEDICATIONS  (STANDING):  aspirin  chewable 81 milliGRAM(s) Oral daily  atorvastatin 20 milliGRAM(s) Oral at bedtime  finasteride 5 milliGRAM(s) Oral daily  metoprolol tartrate 12.5 milliGRAM(s) Oral two times a day  tamsulosin 0.4 milliGRAM(s) Oral daily  valsartan 40 milliGRAM(s) Oral daily    MEDICATIONS  (PRN):  ondansetron Injectable 4 milliGRAM(s) IV Push every 6 hours PRN Nausea      LABS:                        11.1   6.57  )-----------( 270      ( 26 Mar 2018 06:59 )             33.7     03-27    141  |  101  |  16  ----------------------------<  94  4.1   |  27  |  0.84    Ca    9.6      27 Mar 2018 06:37  Mg     2.0     03-27    TPro  7.5  /  Alb  3.9  /  TBili  0.4  /  DBili  x   /  AST  17  /  ALT  15  /  AlkPhos  53  03-26    PT/INR - ( 27 Mar 2018 07:28 )   PT: 20.5 sec;   INR: 1.79 ratio                   MICROBIOLOGY:     RADIOLOGY:  [ ] Reviewed and interpreted by me    Point of Care Ultrasound Findings:    PFT:    EKG: CHIEF COMPLAINT: feels as if water is building--orthopnea/cough w/talking    Interval Events: correcting INR    REVIEW OF SYSTEMS:  Constitutional: No fevers or chills. No weight loss. No fatigue or generalized malaise.  Eyes: No itching or discharge from the eyes  ENT: No ear pain. No ear discharge. No nasal congestion. No post nasal drip. No epistaxis. No throat pain. No sore throat. No difficulty swallowing.   CV: No chest pain. No palpitations. No lightheadedness or dizziness.   Resp: No dyspnea at rest. + dyspnea on exertion. + orthopnea. No wheezing. + cough. No stridor. No sputum production. No chest pain with respiration.  GI: No nausea. No vomiting. No diarrhea.  MSK: No joint pain or pain in any extremities  Integumentary: No skin lesions. No pedal edema.  Neurological: No gross motor weakness. No sensory changes.  [+ ] All other systems negative  [ ] Unable to assess ROS because ________    OBJECTIVE:  ICU Vital Signs Last 24 Hrs  T(C): 36.9 (28 Mar 2018 04:30), Max: 36.9 (27 Mar 2018 20:07)  T(F): 98.5 (28 Mar 2018 04:30), Max: 98.5 (27 Mar 2018 20:07)  HR: 66 (28 Mar 2018 04:30) (57 - 66)  BP: 117/74 (28 Mar 2018 04:30) (100/64 - 117/74)  BP(mean): --  ABP: --  ABP(mean): --  RR: 18 (28 Mar 2018 04:30) (18 - 18)  SpO2: 94% (28 Mar 2018 04:30) (94% - 96%)        03-26 @ 07:01  -  03-27 @ 07:00  --------------------------------------------------------  IN: 530 mL / OUT: 0 mL / NET: 530 mL    03-27 @ 07:01 - 03-28 @ 05:05  --------------------------------------------------------  IN: 1320 mL / OUT: 0 mL / NET: 1320 mL      CAPILLARY BLOOD GLUCOSE          PHYSICAL EXAM: NAD in bed  General: Awake, alert, oriented X 3.   HEENT: Atraumatic, normocephalic.                 Mallampatti Grade 3                No nasal congestion.                No tonsillar or pharyngeal exudates.  Lymph Nodes: No palpable lymphadenopathy  Neck: No JVD. No carotid bruit.   Respiratory: reduced chest expansion                         dullness right to percussion-reduced BS                         Normal and equal air entry                         No wheeze, rhonchi or rales.  Cardiovascular: S1 S2 normal. No murmurs, rubs or gallops.   Abdomen: Soft, non-tender, non-distended. No organomegaly.  Extremities: Warm to touch. Peripheral pulse palpable. No pedal edema.   Skin: No rashes or skin lesions  Neurological: Motor and sensory examination equal and normal in all four extremities.  Psychiatry: Appropriate mood and affect.    HOSPITAL MEDICATIONS:  MEDICATIONS  (STANDING):  aspirin  chewable 81 milliGRAM(s) Oral daily  atorvastatin 20 milliGRAM(s) Oral at bedtime  finasteride 5 milliGRAM(s) Oral daily  metoprolol tartrate 12.5 milliGRAM(s) Oral two times a day  tamsulosin 0.4 milliGRAM(s) Oral daily  valsartan 40 milliGRAM(s) Oral daily    MEDICATIONS  (PRN):  ondansetron Injectable 4 milliGRAM(s) IV Push every 6 hours PRN Nausea      LABS:                        11.1   6.57  )-----------( 270      ( 26 Mar 2018 06:59 )             33.7     03-27    141  |  101  |  16  ----------------------------<  94  4.1   |  27  |  0.84    Ca    9.6      27 Mar 2018 06:37  Mg     2.0     03-27    TPro  7.5  /  Alb  3.9  /  TBili  0.4  /  DBili  x   /  AST  17  /  ALT  15  /  AlkPhos  53  03-26    PT/INR - ( 27 Mar 2018 07:28 )   PT: 20.5 sec;   INR: 1.79 ratio                   MICROBIOLOGY:     RADIOLOGY:  [ ] Reviewed and interpreted by me    Point of Care Ultrasound Findings:    PFT:    EKG:

## 2018-03-28 NOTE — PROGRESS NOTE ADULT - ASSESSMENT
Problem/Plan - 1:  ·  Problem: Pleural effusion.  Plan: unclear etiology  pulmonary consult appreciated  will follow recs.   thorocentesis today and then restart AC    Problem/Plan - 2:  ·  Problem: Dyspnea on exertion.  Plan: fredrick sec to pleural effusion  cards and pulmonary fu      Problem/Plan - 3:  ·  Problem: CAD (coronary artery disease).  Plan: cards fu    DC PLANNING IN AM WITH OUTPT PULMONARY FU

## 2018-03-29 ENCOUNTER — APPOINTMENT (OUTPATIENT)
Dept: CARDIOTHORACIC SURGERY | Facility: CLINIC | Age: 68
End: 2018-03-29

## 2018-03-29 ENCOUNTER — OTHER (OUTPATIENT)
Age: 68
End: 2018-03-29

## 2018-03-29 VITALS
DIASTOLIC BLOOD PRESSURE: 74 MMHG | HEART RATE: 62 BPM | SYSTOLIC BLOOD PRESSURE: 113 MMHG | RESPIRATION RATE: 18 BRPM | OXYGEN SATURATION: 96 % | TEMPERATURE: 98 F

## 2018-03-29 LAB
CHLORIDE FLD-MCNC: 104 MMOL/L — SIGNIFICANT CHANGE UP
HCT VFR BLD CALC: 33 % — LOW (ref 39–50)
HGB BLD-MCNC: 11.4 G/DL — LOW (ref 13–17)
INR BLD: 1.23 RATIO — HIGH (ref 0.88–1.16)
MCHC RBC-ENTMCNC: 30.7 PG — SIGNIFICANT CHANGE UP (ref 27–34)
MCHC RBC-ENTMCNC: 34.6 GM/DL — SIGNIFICANT CHANGE UP (ref 32–36)
MCV RBC AUTO: 88.5 FL — SIGNIFICANT CHANGE UP (ref 80–100)
NIGHT BLUE STAIN TISS: SIGNIFICANT CHANGE UP
PLATELET # BLD AUTO: 259 K/UL — SIGNIFICANT CHANGE UP (ref 150–400)
POTASSIUM FLD-SCNC: 4.3 MMOL/L — SIGNIFICANT CHANGE UP
PROTHROM AB SERPL-ACNC: 13.9 SEC — HIGH (ref 10–13.1)
RBC # BLD: 3.73 M/UL — LOW (ref 4.2–5.8)
RBC # FLD: 11.9 % — SIGNIFICANT CHANGE UP (ref 10.3–14.5)
SODIUM FLD-SCNC: 141 MMOL/L — SIGNIFICANT CHANGE UP
SPECIMEN SOURCE: SIGNIFICANT CHANGE UP
SPECIMEN SOURCE: SIGNIFICANT CHANGE UP
WBC # BLD: 7 K/UL — SIGNIFICANT CHANGE UP (ref 3.8–10.5)
WBC # FLD AUTO: 7 K/UL — SIGNIFICANT CHANGE UP (ref 3.8–10.5)

## 2018-03-29 PROCEDURE — 80053 COMPREHEN METABOLIC PANEL: CPT

## 2018-03-29 PROCEDURE — 84295 ASSAY OF SERUM SODIUM: CPT

## 2018-03-29 PROCEDURE — 87075 CULTR BACTERIA EXCEPT BLOOD: CPT

## 2018-03-29 PROCEDURE — 82570 ASSAY OF URINE CREATININE: CPT

## 2018-03-29 PROCEDURE — 82438 ASSAY OTHER FLUID CHLORIDES: CPT

## 2018-03-29 PROCEDURE — 87070 CULTURE OTHR SPECIMN AEROBIC: CPT

## 2018-03-29 PROCEDURE — 84132 ASSAY OF SERUM POTASSIUM: CPT

## 2018-03-29 PROCEDURE — 32555 ASPIRATE PLEURA W/ IMAGING: CPT

## 2018-03-29 PROCEDURE — 88305 TISSUE EXAM BY PATHOLOGIST: CPT

## 2018-03-29 PROCEDURE — 88112 CYTOPATH CELL ENHANCE TECH: CPT

## 2018-03-29 PROCEDURE — 87206 SMEAR FLUORESCENT/ACID STAI: CPT

## 2018-03-29 PROCEDURE — 84999 UNLISTED CHEMISTRY PROCEDURE: CPT

## 2018-03-29 PROCEDURE — 82803 BLOOD GASES ANY COMBINATION: CPT

## 2018-03-29 PROCEDURE — 85379 FIBRIN DEGRADATION QUANT: CPT

## 2018-03-29 PROCEDURE — 82947 ASSAY GLUCOSE BLOOD QUANT: CPT

## 2018-03-29 PROCEDURE — 83880 ASSAY OF NATRIURETIC PEPTIDE: CPT

## 2018-03-29 PROCEDURE — 71046 X-RAY EXAM CHEST 2 VIEWS: CPT

## 2018-03-29 PROCEDURE — 87015 SPECIMEN INFECT AGNT CONCNTJ: CPT

## 2018-03-29 PROCEDURE — 85027 COMPLETE CBC AUTOMATED: CPT

## 2018-03-29 PROCEDURE — 82435 ASSAY OF BLOOD CHLORIDE: CPT

## 2018-03-29 PROCEDURE — 84484 ASSAY OF TROPONIN QUANT: CPT

## 2018-03-29 PROCEDURE — 99232 SBSQ HOSP IP/OBS MODERATE 35: CPT

## 2018-03-29 PROCEDURE — 84302 ASSAY OF SWEAT SODIUM: CPT

## 2018-03-29 PROCEDURE — 80048 BASIC METABOLIC PNL TOTAL CA: CPT

## 2018-03-29 PROCEDURE — 89051 BODY FLUID CELL COUNT: CPT

## 2018-03-29 PROCEDURE — 87116 MYCOBACTERIA CULTURE: CPT

## 2018-03-29 PROCEDURE — 82330 ASSAY OF CALCIUM: CPT

## 2018-03-29 PROCEDURE — 99285 EMERGENCY DEPT VISIT HI MDM: CPT

## 2018-03-29 PROCEDURE — 85014 HEMATOCRIT: CPT

## 2018-03-29 PROCEDURE — 71275 CT ANGIOGRAPHY CHEST: CPT

## 2018-03-29 PROCEDURE — 71045 X-RAY EXAM CHEST 1 VIEW: CPT

## 2018-03-29 PROCEDURE — 85610 PROTHROMBIN TIME: CPT

## 2018-03-29 PROCEDURE — 83605 ASSAY OF LACTIC ACID: CPT

## 2018-03-29 PROCEDURE — 87205 SMEAR GRAM STAIN: CPT

## 2018-03-29 PROCEDURE — 83735 ASSAY OF MAGNESIUM: CPT

## 2018-03-29 PROCEDURE — 85730 THROMBOPLASTIN TIME PARTIAL: CPT

## 2018-03-29 PROCEDURE — 93005 ELECTROCARDIOGRAM TRACING: CPT

## 2018-03-29 RX ORDER — FINASTERIDE 5 MG/1
1 TABLET, FILM COATED ORAL
Qty: 14 | Refills: 0 | OUTPATIENT
Start: 2018-03-29 | End: 2018-04-11

## 2018-03-29 RX ADMIN — FINASTERIDE 5 MILLIGRAM(S): 5 TABLET, FILM COATED ORAL at 12:11

## 2018-03-29 RX ADMIN — VALSARTAN 40 MILLIGRAM(S): 80 TABLET ORAL at 10:09

## 2018-03-29 RX ADMIN — Medication 12.5 MILLIGRAM(S): at 10:09

## 2018-03-29 RX ADMIN — Medication 81 MILLIGRAM(S): at 12:11

## 2018-03-29 RX ADMIN — TAMSULOSIN HYDROCHLORIDE 0.4 MILLIGRAM(S): 0.4 CAPSULE ORAL at 12:11

## 2018-03-29 NOTE — CHART NOTE - NSCHARTNOTEFT_GEN_A_CORE
Pt feels better, denies dizziness, SOB, chest discomfort, ambulating in hallway without distress, wants to go home today, asking if he can resume his Lasix 20mg once daily. D/w with Dr. Aragon, labs reviewed with attending. Per attending: discharge today, continue with all home medications, including Lasix per home regimen, and same dose Coumadin  (5 mg ).      -D/w pt.  Cari Cordova NP [facility] [id number]

## 2018-03-29 NOTE — PROGRESS NOTE ADULT - PROBLEM SELECTOR PLAN 6
prior lung nodules--will review CT (old) compared to new-after fluid removed

## 2018-03-29 NOTE — PROGRESS NOTE ADULT - PROBLEM SELECTOR PLAN 2
for thorocentesis once INR is below 1.5--radiology to do thoro  will send for all-cyto, ph, cell count, chemistries, ph, cultures--contact IR today to perform s/p thorocentesis by--radiology    send for -cyto, ph, cell count, chemistries, ph, cultures  CXR-no ptx

## 2018-03-29 NOTE — PROGRESS NOTE ADULT - PROBLEM SELECTOR PROBLEM 3
Coronary artery disease involving native heart without angina pectoris, unspecified vessel or lesion type

## 2018-03-29 NOTE — PROGRESS NOTE ADULT - ASSESSMENT
67M Afib on AC, HTN, HLD, CAD s/p CABG, MVR presenting for progressive dyspnea on exertion in setting of a moderate right sided pleural effusion    INR likely to be in acceptable range today for thoro by IR 67M Afib on AC, HTN, HLD, CAD s/p CABG, MVR presenting for progressive dyspnea on exertion in setting of a moderate right sided pleural effusion    Thoro by IR- 1 liter out -fluid sent off for analysis

## 2018-03-29 NOTE — PROGRESS NOTE ADULT - ATTENDING COMMENTS
as above-  To proceed with the thoracentesis as an inpatient. s/p Vitamin K mg PO 3/27 await repeat INR in the AM.   Ultrasound can be reached at [phone] and they want an INR < 1.5. They asked for the order to be placed in [place] once labs are appropriate.  After thoro can re-initiate AC and likely set up for DC    Out pt films-old CT to be reviewed and compared to new films-may need repeat CT after fluid removal.    Chirag Flynn MD-Pulmonary   [phone] as above-  s/p  thoro -- re-initiate AC and likely set up for DC    Out pt films-old CT to be reviewed and compared to new films-may need repeat CT after fluid removal.  Will see in office next week-no earlier than Tuesday to await results of pleural fluid    Chirag Flynn MD-Pulmonary   [phone]

## 2018-03-29 NOTE — CHART NOTE - NSCHARTNOTEFT_GEN_A_CORE
Pt concerned about lab specimen described as "Paracentesis" should be Thoracentesis as pt underwent thoracentesis in [place] . I discussed with lab dept. Per Pathologist Ilya (ext [phone]), specimens she received were labeled pleural and ran tests correctly. I d/w Core lab Rachel ([phone]) who make proper corrections so culture is from thoracentesis/pleural fluid.       Cari Cordova NP [facility] [id number]

## 2018-03-29 NOTE — PROGRESS NOTE ADULT - PROBLEM SELECTOR PLAN 1
multifactorial-overwight, cardiac dz, effusion (restrictive dysfunction)

## 2018-03-29 NOTE — PROGRESS NOTE ADULT - PROBLEM SELECTOR PLAN 5
GI prophylaxis:  PPI pre breakfast  aspiration precautions-all meals are to OOB as able

## 2018-03-29 NOTE — PROGRESS NOTE ADULT - SUBJECTIVE AND OBJECTIVE BOX
CHIEF COMPLAINT:    Interval Events:    REVIEW OF SYSTEMS:  Constitutional: No fevers or chills. No weight loss. No fatigue or generalized malaise.  Eyes: No itching or discharge from the eyes  ENT: No ear pain. No ear discharge. No nasal congestion. No post nasal drip. No epistaxis. No throat pain. No sore throat. No difficulty swallowing.   CV: No chest pain. No palpitations. No lightheadedness or dizziness.   Resp: No dyspnea at rest. No dyspnea on exertion. No orthopnea. No wheezing. No cough. No stridor. No sputum production. No chest pain with respiration.  GI: No nausea. No vomiting. No diarrhea.  MSK: No joint pain or pain in any extremities  Integumentary: No skin lesions. No pedal edema.  Neurological: No gross motor weakness. No sensory changes.  [ ] All other systems negative  [ ] Unable to assess ROS because ________    OBJECTIVE:  ICU Vital Signs Last 24 Hrs  T(C): 36.9 (29 Mar 2018 04:25), Max: 37.3 (28 Mar 2018 20:20)  T(F): 98.5 (29 Mar 2018 04:25), Max: 99.1 (28 Mar 2018 20:20)  HR: 61 (29 Mar 2018 04:25) (61 - 78)  BP: 122/75 (29 Mar 2018 04:25) (114/71 - 122/75)  BP(mean): --  ABP: --  ABP(mean): --  RR: 18 (29 Mar 2018 04:25) (18 - 18)  SpO2: 97% (29 Mar 2018 04:25) (95% - 97%)        03-27 @ 07:01 - 03-28 @ 07:00  --------------------------------------------------------  IN: 1370 mL / OUT: 0 mL / NET: 1370 mL    03-28 @ 07:01 - 03-29 @ 05:08  --------------------------------------------------------  IN: 360 mL / OUT: 0 mL / NET: 360 mL      CAPILLARY BLOOD GLUCOSE          PHYSICAL EXAM:  General: Awake, alert, oriented X 3.   HEENT: Atraumatic, normocephalic.                 Mallampatti Grade                 No nasal congestion.                No tonsillar or pharyngeal exudates.  Lymph Nodes: No palpable lymphadenopathy  Neck: No JVD. No carotid bruit.   Respiratory: Normal chest expansion                         Normal percussion                         Normal and equal air entry                         No wheeze, rhonchi or rales.  Cardiovascular: S1 S2 normal. No murmurs, rubs or gallops.   Abdomen: Soft, non-tender, non-distended. No organomegaly.  Extremities: Warm to touch. Peripheral pulse palpable. No pedal edema.   Skin: No rashes or skin lesions  Neurological: Motor and sensory examination equal and normal in all four extremities.  Psychiatry: Appropriate mood and affect.    HOSPITAL MEDICATIONS:  MEDICATIONS  (STANDING):  aspirin  chewable 81 milliGRAM(s) Oral daily  atorvastatin 20 milliGRAM(s) Oral at bedtime  finasteride 5 milliGRAM(s) Oral daily  metoprolol tartrate 12.5 milliGRAM(s) Oral two times a day  tamsulosin 0.4 milliGRAM(s) Oral daily  valsartan 40 milliGRAM(s) Oral daily    MEDICATIONS  (PRN):  ondansetron Injectable 4 milliGRAM(s) IV Push every 6 hours PRN Nausea      LABS:                        11.2   7.32  )-----------( 261      ( 28 Mar 2018 07:31 )             33.9     03-27    141  |  101  |  16  ----------------------------<  94  4.1   |  27  |  0.84    Ca    9.6      27 Mar 2018 06:37  Mg     2.0     03-27      PT/INR - ( 28 Mar 2018 07:32 )   PT: 15.7 sec;   INR: 1.38 ratio                   MICROBIOLOGY:     RADIOLOGY:  [ ] Reviewed and interpreted by me    Point of Care Ultrasound Findings:    PFT:    EKG: CHIEF COMPLAINT: slightly jennie--less cough and sob    Interval Events: thoro-1 liter out    REVIEW OF SYSTEMS:  Constitutional: No fevers or chills. No weight loss. No fatigue or generalized malaise.  Eyes: No itching or discharge from the eyes  ENT: No ear pain. No ear discharge. No nasal congestion. No post nasal drip. No epistaxis. No throat pain. No sore throat. No difficulty swallowing.   CV: No chest pain. No palpitations. No lightheadedness or dizziness.   Resp: No dyspnea at rest. + dyspnea on exertion. No orthopnea. No wheezing. + cough. No stridor. No sputum production. No chest pain with respiration.  GI: No nausea. No vomiting. No diarrhea.  MSK: No joint pain or pain in any extremities  Integumentary: No skin lesions. No pedal edema.  Neurological: No gross motor weakness. No sensory changes.  [+ ] All other systems negative  [ ] Unable to assess ROS because ________    OBJECTIVE:  ICU Vital Signs Last 24 Hrs  T(C): 36.9 (29 Mar 2018 04:25), Max: 37.3 (28 Mar 2018 20:20)  T(F): 98.5 (29 Mar 2018 04:25), Max: 99.1 (28 Mar 2018 20:20)  HR: 61 (29 Mar 2018 04:25) (61 - 78)  BP: 122/75 (29 Mar 2018 04:25) (114/71 - 122/75)  BP(mean): --  ABP: --  ABP(mean): --  RR: 18 (29 Mar 2018 04:25) (18 - 18)  SpO2: 97% (29 Mar 2018 04:25) (95% - 97%)        03-27 @ 07:01 - 03-28 @ 07:00  --------------------------------------------------------  IN: 1370 mL / OUT: 0 mL / NET: 1370 mL    03-28 @ 07:01 - 03-29 @ 05:08  --------------------------------------------------------  IN: 360 mL / OUT: 0 mL / NET: 360 mL      CAPILLARY BLOOD GLUCOSE          PHYSICAL EXAM: NAD in chair  General: Awake, alert, oriented X 3.   HEENT: Atraumatic, normocephalic.                 Mallampatti Grade 3                No nasal congestion.                No tonsillar or pharyngeal exudates.  Lymph Nodes: No palpable lymphadenopathy  Neck: No JVD. No carotid bruit.   Respiratory: reduced chest expansion- right side                         abnormal percussion-right base                         Normal and equal air entry                         No wheeze, rhonchi or rales.  Cardiovascular: S1 S2 normal. No murmurs, rubs or gallops.   Abdomen: Soft, non-tender, non-distended. No organomegaly.  Extremities: Warm to touch. Peripheral pulse palpable. No pedal edema.   Skin: No rashes or skin lesions  Neurological: Motor and sensory examination equal and normal in all four extremities.  Psychiatry: Appropriate mood and affect.    HOSPITAL MEDICATIONS:  MEDICATIONS  (STANDING):  aspirin  chewable 81 milliGRAM(s) Oral daily  atorvastatin 20 milliGRAM(s) Oral at bedtime  finasteride 5 milliGRAM(s) Oral daily  metoprolol tartrate 12.5 milliGRAM(s) Oral two times a day  tamsulosin 0.4 milliGRAM(s) Oral daily  valsartan 40 milliGRAM(s) Oral daily    MEDICATIONS  (PRN):  ondansetron Injectable 4 milliGRAM(s) IV Push every 6 hours PRN Nausea      LABS:                        11.2   7.32  )-----------( 261      ( 28 Mar 2018 07:31 )             33.9     03-27    141  |  101  |  16  ----------------------------<  94  4.1   |  27  |  0.84    Ca    9.6      27 Mar 2018 06:37  Mg     2.0     03-27      PT/INR - ( 28 Mar 2018 07:32 )   PT: 15.7 sec;   INR: 1.38 ratio                   MICROBIOLOGY:     RADIOLOGY: < from: CT Angio Chest w/ IV Cont (03.25.18 @ 11:12) >    INTERPRETATION:  CLINICAL INFORMATION: Shortness of breath for 2 weeks   and coughing.    COMPARISON: Chest radiograph 3/25/2013.    PROCEDURE:   CT Angiography of the Chest.  90 ml of Omnipaque 350 was injected intravenously. 10 ml were discarded.  Sagittal and coronal reformats were performed as well as 3D (MIP)   reconstructions.    FINDINGS:    LUNGS AND LARGE AIRWAYS: Right upper lobe lung cysts. Atelectasis of the   right lower lobe. The lungs are otherwise clear. The airways are   unremarkable.    PLEURA: The right pleural effusion occupies approximately 25% of the   right hemithorax. No pneumothorax.    PULMONARY ARTERY: Main pulmonary artery is normal in caliber. There is no   main, central, lobar, segmental or subsegmental pulmonary embolus.    AORTA: Atheromatous calcifications of the aorta. Normal caliber aorta and   main pulmonary artery.    HEART: Heart size is normal. No pericardial effusion. Status post CABG.   Coronary artery calcifications. Mitral valve annuloplasty ring.    MEDIASTINUM AND ARVIN: No lymphadenopathy.    CHEST WALL AND LOWER NECK: Sternotomy.    VISUALIZED UPPER ABDOMEN: Bilateral upper pole renal cysts.    BONES: Mild degenerative changes.    IMPRESSION:     1.  No pulmonary embolism.   2.   Right pleural effusion.                  < end of copied text >    [ ] Reviewed and interpreted by me    Point of Care Ultrasound Findings:    PFT:    EKG:

## 2018-03-30 LAB — NON-GYNECOLOGICAL CYTOLOGY STUDY: SIGNIFICANT CHANGE UP

## 2018-04-02 LAB
CULTURE RESULTS: NO GROWTH — SIGNIFICANT CHANGE UP
SPECIMEN SOURCE: SIGNIFICANT CHANGE UP

## 2018-04-10 ENCOUNTER — INPATIENT (INPATIENT)
Facility: HOSPITAL | Age: 68
LOS: 8 days | Discharge: HOME | DRG: 164 | End: 2018-04-19
Attending: INTERNAL MEDICINE | Admitting: INTERNAL MEDICINE
Payer: MEDICARE

## 2018-04-10 VITALS
TEMPERATURE: 98 F | OXYGEN SATURATION: 94 % | HEIGHT: 70 IN | DIASTOLIC BLOOD PRESSURE: 81 MMHG | WEIGHT: 237 LBS | SYSTOLIC BLOOD PRESSURE: 111 MMHG | HEART RATE: 88 BPM | RESPIRATION RATE: 18 BRPM

## 2018-04-10 DIAGNOSIS — R50.9 FEVER, UNSPECIFIED: ICD-10-CM

## 2018-04-10 DIAGNOSIS — Z95.4 PRESENCE OF OTHER HEART-VALVE REPLACEMENT: Chronic | ICD-10-CM

## 2018-04-10 DIAGNOSIS — J90 PLEURAL EFFUSION, NOT ELSEWHERE CLASSIFIED: ICD-10-CM

## 2018-04-10 LAB
ALBUMIN SERPL ELPH-MCNC: 3.9 G/DL — SIGNIFICANT CHANGE UP (ref 3.3–5)
ALP SERPL-CCNC: 51 U/L — SIGNIFICANT CHANGE UP (ref 40–120)
ALT FLD-CCNC: 20 U/L RC — SIGNIFICANT CHANGE UP (ref 10–45)
ANION GAP SERPL CALC-SCNC: 10 MMOL/L — SIGNIFICANT CHANGE UP (ref 5–17)
APTT BLD: 43.8 SEC — HIGH (ref 27.5–37.4)
AST SERPL-CCNC: 20 U/L — SIGNIFICANT CHANGE UP (ref 10–40)
BASOPHILS # BLD AUTO: 0 K/UL — SIGNIFICANT CHANGE UP (ref 0–0.2)
BASOPHILS NFR BLD AUTO: 0.7 % — SIGNIFICANT CHANGE UP (ref 0–2)
BILIRUB SERPL-MCNC: 0.3 MG/DL — SIGNIFICANT CHANGE UP (ref 0.2–1.2)
BUN SERPL-MCNC: 17 MG/DL — SIGNIFICANT CHANGE UP (ref 7–23)
CALCIUM SERPL-MCNC: 9.2 MG/DL — SIGNIFICANT CHANGE UP (ref 8.4–10.5)
CHLORIDE SERPL-SCNC: 99 MMOL/L — SIGNIFICANT CHANGE UP (ref 96–108)
CO2 SERPL-SCNC: 27 MMOL/L — SIGNIFICANT CHANGE UP (ref 22–31)
CREAT SERPL-MCNC: 0.93 MG/DL — SIGNIFICANT CHANGE UP (ref 0.5–1.3)
EOSINOPHIL # BLD AUTO: 0.4 K/UL — SIGNIFICANT CHANGE UP (ref 0–0.5)
EOSINOPHIL NFR BLD AUTO: 6.3 % — HIGH (ref 0–6)
GAS PNL BLDV: SIGNIFICANT CHANGE UP
GLUCOSE SERPL-MCNC: 105 MG/DL — HIGH (ref 70–99)
HCT VFR BLD CALC: 31 % — LOW (ref 39–50)
HGB BLD-MCNC: 10.3 G/DL — LOW (ref 13–17)
INR BLD: 2.3 RATIO — HIGH (ref 0.88–1.16)
LYMPHOCYTES # BLD AUTO: 1 K/UL — SIGNIFICANT CHANGE UP (ref 1–3.3)
LYMPHOCYTES # BLD AUTO: 16.9 % — SIGNIFICANT CHANGE UP (ref 13–44)
MCHC RBC-ENTMCNC: 29.2 PG — SIGNIFICANT CHANGE UP (ref 27–34)
MCHC RBC-ENTMCNC: 33.2 GM/DL — SIGNIFICANT CHANGE UP (ref 32–36)
MCV RBC AUTO: 88 FL — SIGNIFICANT CHANGE UP (ref 80–100)
MONOCYTES # BLD AUTO: 0.8 K/UL — SIGNIFICANT CHANGE UP (ref 0–0.9)
MONOCYTES NFR BLD AUTO: 14.6 % — HIGH (ref 2–14)
NEUTROPHILS # BLD AUTO: 3.6 K/UL — SIGNIFICANT CHANGE UP (ref 1.8–7.4)
NEUTROPHILS NFR BLD AUTO: 61.5 % — SIGNIFICANT CHANGE UP (ref 43–77)
PLATELET # BLD AUTO: 238 K/UL — SIGNIFICANT CHANGE UP (ref 150–400)
POTASSIUM SERPL-MCNC: 4.4 MMOL/L — SIGNIFICANT CHANGE UP (ref 3.5–5.3)
POTASSIUM SERPL-SCNC: 4.4 MMOL/L — SIGNIFICANT CHANGE UP (ref 3.5–5.3)
PROT SERPL-MCNC: 7.9 G/DL — SIGNIFICANT CHANGE UP (ref 6–8.3)
PROTHROM AB SERPL-ACNC: 25.5 SEC — HIGH (ref 9.8–12.7)
RAPID RVP RESULT: DETECTED
RBC # BLD: 3.52 M/UL — LOW (ref 4.2–5.8)
RBC # FLD: 12.1 % — SIGNIFICANT CHANGE UP (ref 10.3–14.5)
RV+EV RNA SPEC QL NAA+PROBE: DETECTED
SODIUM SERPL-SCNC: 136 MMOL/L — SIGNIFICANT CHANGE UP (ref 135–145)
TROPONIN T SERPL-MCNC: <0.01 NG/ML — SIGNIFICANT CHANGE UP (ref 0–0.06)
WBC # BLD: 5.8 K/UL — SIGNIFICANT CHANGE UP (ref 3.8–10.5)
WBC # FLD AUTO: 5.8 K/UL — SIGNIFICANT CHANGE UP (ref 3.8–10.5)

## 2018-04-10 PROCEDURE — 99285 EMERGENCY DEPT VISIT HI MDM: CPT | Mod: 25,GC

## 2018-04-10 PROCEDURE — 93010 ELECTROCARDIOGRAM REPORT: CPT

## 2018-04-10 PROCEDURE — 99223 1ST HOSP IP/OBS HIGH 75: CPT

## 2018-04-10 PROCEDURE — 71046 X-RAY EXAM CHEST 2 VIEWS: CPT | Mod: 26

## 2018-04-10 PROCEDURE — 71250 CT THORAX DX C-: CPT | Mod: 26

## 2018-04-10 RX ORDER — ALBUTEROL 90 UG/1
2 AEROSOL, METERED ORAL
Qty: 0 | Refills: 0 | COMMUNITY

## 2018-04-10 RX ORDER — METOPROLOL TARTRATE 50 MG
25 TABLET ORAL
Qty: 0 | Refills: 0 | Status: DISCONTINUED | OUTPATIENT
Start: 2018-04-10 | End: 2018-04-12

## 2018-04-10 RX ORDER — ROSUVASTATIN CALCIUM 5 MG/1
1 TABLET ORAL
Qty: 0 | Refills: 0 | COMMUNITY

## 2018-04-10 RX ORDER — BUDESONIDE AND FORMOTEROL FUMARATE DIHYDRATE 160; 4.5 UG/1; UG/1
2 AEROSOL RESPIRATORY (INHALATION)
Qty: 0 | Refills: 0 | Status: DISCONTINUED | OUTPATIENT
Start: 2018-04-10 | End: 2018-04-13

## 2018-04-10 RX ORDER — SODIUM CHLORIDE 9 MG/ML
1000 INJECTION INTRAMUSCULAR; INTRAVENOUS; SUBCUTANEOUS ONCE
Qty: 0 | Refills: 0 | Status: COMPLETED | OUTPATIENT
Start: 2018-04-10 | End: 2018-04-10

## 2018-04-10 RX ORDER — ONDANSETRON 8 MG/1
4 TABLET, FILM COATED ORAL EVERY 6 HOURS
Qty: 0 | Refills: 0 | Status: DISCONTINUED | OUTPATIENT
Start: 2018-04-10 | End: 2018-04-13

## 2018-04-10 RX ORDER — OMEGA-3 ACID ETHYL ESTERS 1 G
2 CAPSULE ORAL
Qty: 0 | Refills: 0 | COMMUNITY

## 2018-04-10 RX ORDER — FUROSEMIDE 40 MG
20 TABLET ORAL DAILY
Qty: 0 | Refills: 0 | Status: DISCONTINUED | OUTPATIENT
Start: 2018-04-10 | End: 2018-04-13

## 2018-04-10 RX ORDER — OXYCODONE AND ACETAMINOPHEN 5; 325 MG/1; MG/1
2 TABLET ORAL EVERY 6 HOURS
Qty: 0 | Refills: 0 | Status: DISCONTINUED | OUTPATIENT
Start: 2018-04-10 | End: 2018-04-13

## 2018-04-10 RX ORDER — ENOXAPARIN SODIUM 100 MG/ML
40 INJECTION SUBCUTANEOUS EVERY 24 HOURS
Qty: 0 | Refills: 0 | Status: DISCONTINUED | OUTPATIENT
Start: 2018-04-10 | End: 2018-04-13

## 2018-04-10 RX ORDER — ASPIRIN/CALCIUM CARB/MAGNESIUM 324 MG
81 TABLET ORAL DAILY
Qty: 0 | Refills: 0 | Status: DISCONTINUED | OUTPATIENT
Start: 2018-04-10 | End: 2018-04-13

## 2018-04-10 RX ORDER — VANCOMYCIN HCL 1 G
1000 VIAL (EA) INTRAVENOUS ONCE
Qty: 0 | Refills: 0 | Status: COMPLETED | OUTPATIENT
Start: 2018-04-10 | End: 2018-04-10

## 2018-04-10 RX ORDER — IPRATROPIUM/ALBUTEROL SULFATE 18-103MCG
3 AEROSOL WITH ADAPTER (GRAM) INHALATION EVERY 6 HOURS
Qty: 0 | Refills: 0 | Status: DISCONTINUED | OUTPATIENT
Start: 2018-04-10 | End: 2018-04-13

## 2018-04-10 RX ORDER — FINASTERIDE 5 MG/1
1 TABLET, FILM COATED ORAL
Qty: 0 | Refills: 0 | COMMUNITY

## 2018-04-10 RX ORDER — OMEGA-3 ACID ETHYL ESTERS 1 G
1 CAPSULE ORAL
Qty: 0 | Refills: 0 | COMMUNITY

## 2018-04-10 RX ORDER — VALSARTAN 80 MG/1
40 TABLET ORAL DAILY
Qty: 0 | Refills: 0 | Status: DISCONTINUED | OUTPATIENT
Start: 2018-04-10 | End: 2018-04-13

## 2018-04-10 RX ORDER — FINASTERIDE 5 MG/1
5 TABLET, FILM COATED ORAL DAILY
Qty: 0 | Refills: 0 | Status: DISCONTINUED | OUTPATIENT
Start: 2018-04-10 | End: 2018-04-13

## 2018-04-10 RX ORDER — CHOLECALCIFEROL (VITAMIN D3) 125 MCG
2 CAPSULE ORAL
Qty: 0 | Refills: 0 | COMMUNITY

## 2018-04-10 RX ORDER — AZELASTINE HYDROCHLORIDE AND FLUTICASONE PROPIONATE 137; 50 UG/1; UG/1
1 SPRAY, METERED NASAL
Qty: 0 | Refills: 0 | COMMUNITY

## 2018-04-10 RX ORDER — TAMSULOSIN HYDROCHLORIDE 0.4 MG/1
0.4 CAPSULE ORAL DAILY
Qty: 0 | Refills: 0 | Status: DISCONTINUED | OUTPATIENT
Start: 2018-04-10 | End: 2018-04-13

## 2018-04-10 RX ORDER — ATORVASTATIN CALCIUM 80 MG/1
20 TABLET, FILM COATED ORAL AT BEDTIME
Qty: 0 | Refills: 0 | Status: DISCONTINUED | OUTPATIENT
Start: 2018-04-10 | End: 2018-04-13

## 2018-04-10 RX ORDER — ASPIRIN/CALCIUM CARB/MAGNESIUM 324 MG
1 TABLET ORAL
Qty: 0 | Refills: 0 | COMMUNITY

## 2018-04-10 RX ORDER — SODIUM CHLORIDE 9 MG/ML
1500 INJECTION INTRAMUSCULAR; INTRAVENOUS; SUBCUTANEOUS ONCE
Qty: 0 | Refills: 0 | Status: COMPLETED | OUTPATIENT
Start: 2018-04-10 | End: 2018-04-10

## 2018-04-10 RX ADMIN — Medication 3 MILLILITER(S): at 21:13

## 2018-04-10 RX ADMIN — SODIUM CHLORIDE 1000 MILLILITER(S): 9 INJECTION INTRAMUSCULAR; INTRAVENOUS; SUBCUTANEOUS at 10:25

## 2018-04-10 RX ADMIN — Medication 25 MILLIGRAM(S): at 18:35

## 2018-04-10 RX ADMIN — Medication 250 MILLIGRAM(S): at 11:59

## 2018-04-10 RX ADMIN — SODIUM CHLORIDE 1500 MILLILITER(S): 9 INJECTION INTRAMUSCULAR; INTRAVENOUS; SUBCUTANEOUS at 11:30

## 2018-04-10 NOTE — ED PROVIDER NOTE - MEDICAL DECISION MAKING DETAILS
68 yo male with shortness of breath and cough; rule out reaccumulation of pleural fluid vs infectious etiology; will obtain cultures fluids abx labs cxr ekg --> admit 68 yo male with shortness of breath and cough; rule out reaccumulation of pleural fluid vs infectious etiology; will obtain cultures fluids abx labs cxr ekg --> admit    maru - recent exudative pleural effsuion drained with now worsening productive cough exertional sob, fever 103 - need to tx for paraneumnic effusion -- abx - ?ct chest vs pocus will need admission

## 2018-04-10 NOTE — CONSULT NOTE ADULT - ASSESSMENT
67M Afib on AC coumadin, HTN, HLD, CAD s/p CABG, MVR , here  for progressive dyspnea on exertion   s/p Rt thoracentesis 2 weeks with reoccurence found on chest Ct today    Thoro by IR- 1 liter out -fluid sent off for analysis 67M Afib on AC coumadin now held H , HTN, CAD s/p CABG, MVR , here  for progressive dyspnea on exertion   s/p Rt thoracentesis 2 weeks with reoccurence found on chest Ct today with Small right pleural effusion without enhancement to suggest empyema and Adjacent partial right lower lobe atelectasis.

## 2018-04-10 NOTE — ED ADULT TRIAGE NOTE - CHIEF COMPLAINT QUOTE
cough and fever, onset yesterday, discharged from here 2 weeks ago," fluids removed from lung" a week ago cough and fever, onset yesterday, discharged from here 2 weeks ago," fluids removed from lung" a week ago. with shortness of breathe

## 2018-04-10 NOTE — H&P ADULT - NSHPLABSRESULTS_GEN_ALL_CORE
Lab Results:  CBC  CBC Full  -  ( 10 Apr 2018 10:36 )  WBC Count : 5.8 K/uL  Hemoglobin : 10.3 g/dL  Hematocrit : 31.0 %  Platelet Count - Automated : 238 K/uL  Mean Cell Volume : 88.0 fl  Mean Cell Hemoglobin : 29.2 pg  Mean Cell Hemoglobin Concentration : 33.2 gm/dL  Auto Neutrophil # : 3.6 K/uL  Auto Lymphocyte # : 1.0 K/uL  Auto Monocyte # : 0.8 K/uL  Auto Eosinophil # : 0.4 K/uL  Auto Basophil # : 0.0 K/uL  Auto Neutrophil % : 61.5 %  Auto Lymphocyte % : 16.9 %  Auto Monocyte % : 14.6 %  Auto Eosinophil % : 6.3 %  Auto Basophil % : 0.7 %    .		Differential:	[] Automated		[] Manual  Chemistry                        10.3   5.8   )-----------( 238      ( 10 Apr 2018 10:36 )             31.0     04-10    136  |  99  |  17  ----------------------------<  105<H>  4.4   |  27  |  0.93    Ca    9.2      10 Apr 2018 10:37    TPro  7.9  /  Alb  3.9  /  TBili  0.3  /  DBili  x   /  AST  20  /  ALT  20  /  AlkPhos  51  04-10    LIVER FUNCTIONS - ( 10 Apr 2018 10:37 )  Alb: 3.9 g/dL / Pro: 7.9 g/dL / ALK PHOS: 51 U/L / ALT: 20 U/L RC / AST: 20 U/L / GGT: x           PT/INR - ( 10 Apr 2018 10:37 )   PT: 25.5 sec;   INR: 2.30 ratio         PTT - ( 10 Apr 2018 10:37 )  PTT:43.8 sec          MICROBIOLOGY/CULTURES:      RADIOLOGY RESULTS: reviewed

## 2018-04-10 NOTE — CONSULT NOTE ADULT - ASSESSMENT
67 M with a PMH of HTN, HLD, CAD, diastolic dysfunction,  s/p MV annuloplasty, A fib on coumadin, s/p recent hospitalization for SOB with draiange of R pleural effusion, now returning with enterorhino virus infection and SOB with a persistent loculated R pleural effusion mildly increased in size.     1. R pleural Effusion:  - Pt seen by thoracic surgery, plan for VATs with biopsy on Thursday  - Currently saturating well on RA  - Would suggest Duonebs Q6H  - Currently no wheezing on exam, however if he continues to have intermittent wheezing would suggest Prednisone 40 mg daily x 5 days  - Can start Symbicort BID  - Chest PT, incentive spirometry/ acapella Q6H  - Would hold AC in anticipation of OR. May require a heparin gtt once INR <2    2. CAD/ HTN:  - Cont ASA, Lipitor, lasix, lopressor and valsartan     3. Gen:  - DVT Px: On coumadin   - Dr. Flynn will follow pt during hospitalization

## 2018-04-10 NOTE — ED PROVIDER NOTE - PROGRESS NOTE DETAILS
spoke to dr willoughby who knows about the patient and will get in touch with thoracic surgery -stephanie

## 2018-04-10 NOTE — ED PROVIDER NOTE - NS ED ROS FT
Constitutional: + fever  Eyes: no conjunctivitis  Ears: no ear pain   Nose: no nasal congestion, Mouth/Throat: no throat pain, Neck: no stiffness  Cardiovascular: no chest pain   Chest: + cough  Gastrointestinal: no abdominal pain, no vomiting and diarrhea  MSK: no joint pain  : no dysuria  Skin: no rash  Neuro: no LOC

## 2018-04-10 NOTE — H&P ADULT - HISTORY OF PRESENT ILLNESS
66 yo male presenting with 2 day hx of acutely worsening cough and fever.  fever high of 101.3 this morning.  productive cough which has been going on for a few days. yellowish sputum.  did not take anything for his symptoms.

## 2018-04-10 NOTE — CONSULT NOTE ADULT - SUBJECTIVE AND OBJECTIVE BOX
CHIEF COMPLAINT:    HPI:    PAST MEDICAL & SURGICAL HISTORY:  Kidney stones  Hyperlipidemia  Hypertension  CAD (coronary artery disease)  H/O mitral valve replacement      FAMILY HISTORY:  No pertinent family history in first degree relatives      SOCIAL HISTORY:  Smoking: [ ] Never Smoked [ ] Former Smoker (__ packs x ___ years) [ ] Current Smoker  (__ packs x ___ years)  Substance Use: [ ] Never Used [ ] Used ____  EtOH Use:  Marital Status: [ ] Single [ ]  [ ]  [ ]   Sexual History:   Occupation:  Recent Travel:  Country of Birth:  Advance Directives:    Allergies    penicillin (Flushing)    Intolerances        HOME MEDICATIONS:    REVIEW OF SYSTEMS:  Constitutional: [ ] negative [ ] fevers [ ] chills [ ] weight loss [ ] weight gain  HEENT: [ ] negative [ ] dry eyes [ ] eye irritation [ ] postnasal drip [ ] nasal congestion  CV: [ ] negative  [ ] chest pain [ ] orthopnea [ ] palpitations [ ] murmur  Resp: [ ] negative [ ] cough [ ] shortness of breath [ ] dyspnea [ ] wheezing [ ] sputum [ ] hemoptysis  GI: [ ] negative [ ] nausea [ ] vomiting [ ] diarrhea [ ] constipation [ ] abd pain [ ] dysphagia   : [ ] negative [ ] dysuria [ ] nocturia [ ] hematuria [ ] increased urinary frequency  Musculoskeletal: [ ] negative [ ] back pain [ ] myalgias [ ] arthralgias [ ] fracture  Skin: [ ] negative [ ] rash [ ] itch  Neurological: [ ] negative [ ] headache [ ] dizziness [ ] syncope [ ] weakness [ ] numbness  Psychiatric: [ ] negative [ ] anxiety [ ] depression  Endocrine: [ ] negative [ ] diabetes [ ] thyroid problem  Hematologic/Lymphatic: [ ] negative [ ] anemia [ ] bleeding problem  Allergic/Immunologic: [ ] negative [ ] itchy eyes [ ] nasal discharge [ ] hives [ ] angioedema  [ ] All other systems negative  [ ] Unable to assess ROS because ________    OBJECTIVE:  ICU Vital Signs Last 24 Hrs  T(C): 37.2 (10 Apr 2018 14:54), Max: 37.4 (10 Apr 2018 09:45)  T(F): 98.9 (10 Apr 2018 14:54), Max: 99.3 (10 Apr 2018 09:45)  HR: 73 (10 Apr 2018 14:54) (71 - 88)  BP: 132/70 (10 Apr 2018 14:54) (111/81 - 134/73)  BP(mean): --  ABP: --  ABP(mean): --  RR: 20 (10 Apr 2018 14:54) (18 - 26)  SpO2: 96% (10 Apr 2018 14:54) (94% - 97%)        CAPILLARY BLOOD GLUCOSE          PHYSICAL EXAM:  General:   HEENT:   Lymph Nodes:  Neck:   Respiratory:   Cardiovascular:   Abdomen:   Extremities:   Skin:   Neurological:  Psychiatry:    HOSPITAL MEDICATIONS:  aspirin  chewable 81 milliGRAM(s) Oral daily      furosemide    Tablet 20 milliGRAM(s) Oral daily  metoprolol tartrate 25 milliGRAM(s) Oral two times a day  tamsulosin 0.4 milliGRAM(s) Oral daily  valsartan 40 milliGRAM(s) Oral daily    atorvastatin 20 milliGRAM(s) Oral at bedtime  finasteride 5 milliGRAM(s) Oral daily      oxyCODONE    5 mG/acetaminophen 325 mG 2 Tablet(s) Oral every 6 hours PRN                    LABS:                        10.3   5.8   )-----------( 238      ( 10 Apr 2018 10:36 )             31.0     Hgb Trend: 10.3<--  04-10    136  |  99  |  17  ----------------------------<  105<H>  4.4   |  27  |  0.93    Ca    9.2      10 Apr 2018 10:37    TPro  7.9  /  Alb  3.9  /  TBili  0.3  /  DBili  x   /  AST  20  /  ALT  20  /  AlkPhos  51  04-10    Creatinine Trend: 0.93<--, 0.84<--, 0.84<--, 0.85<--  PT/INR - ( 10 Apr 2018 10:37 )   PT: 25.5 sec;   INR: 2.30 ratio         PTT - ( 10 Apr 2018 10:37 )  PTT:43.8 sec      Venous Blood Gas:  04-10 @ 10:41  7.38/48/29/28/48  VBG Lactate: 0.9      MICROBIOLOGY:     RADIOLOGY:  [ ] Reviewed and interpreted by me    PULMONARY FUNCTION TESTS:    EKG: CHIEF COMPLAINT: Fever/ Shortness of breath     HPI:  67 M with a PMH of HTN, HLD, CAD, diastolic dysfunction,  s/p MV annuloplasty, A fib on coumadin, s/p recent hospital admission from 3/25-3/29/18 during which he underwent a R thoracentesis with removal of 1 L. The pleural fluid studies were consistent with exudative fluid , cytopath was negative for malignancy. He now returns with complaints of fever of 101 F for 1-2 days along with a cough productive of clear and occasionally yellow sputum as well as worsening shortness of breath. He reports that the shortness of breath has been gradually worsening for the past 1.5 week. His exercise tolerance has been increasingly limited due to the SOB. He denies any chest pain, nausea, vomiting, diarrhea dysuria or hematuria but does report mild intermittent wheezing. An RVP was positive for entero rhino virus and a Ct chest showed increase in a small to moderate loculated R pleural effusion.     PAST MEDICAL & SURGICAL HISTORY:  Kidney stones  Hyperlipidemia  Hypertension  CAD (coronary artery disease)  H/O mitral valve replacement      FAMILY HISTORY:  FH reviewed and No pertinent family history in first degree relatives      SOCIAL HISTORY:  Smoking: [x ] Never Smoked [ ] Former Smoker (__ packs x ___ years) [ ] Current Smoker  (__ packs x ___ years)  Substance Use: [x ] Never Used [ ] Used ____  EtOH Use: Social   Marital Status: [ ] Single [ x]  [ ]  [ ]   Sexual History:  NC  Occupation: Retired   Recent Travel: None  Country of Birth: [facility]  Advance Directives: Full code     Allergies    penicillin (Flushing)    Intolerances        HOME MEDICATIONS:  finasteride 5 mg oral tablet: 1 tab(s) orally once a day  · 	oxyCODONE-acetaminophen 5 mg-325 mg oral tablet: 2 tab(s) orally every 6 hours MDD:8  · 	valsartan 40 mg oral tablet: 1 tab(s) orally once a day  · 	Flomax 0.4 mg oral capsule: 1 cap(s) orally once a day  · 	warfarin 5 mg oral tablet: 1 tab(s) orally at bedtime  · 	Metoprolol Tartrate 25 mg oral tablet: 0.5 tab(s) orally 2 times a day  · 	atorvastatin 20 mg oral tablet: 1 tab(s) orally once a day (at bedtime)  · 	Lovaza 1000 mg oral capsule: 1 cap(s) orally once a day  · 	Aspir 81: 1 tab(s) orally once a day  · 	Foltanx: 1 tab(s) orally once a day  · 	Fish Oil: 1 cap(s) orally once a day  · 	Lasix 20 mg oral tablet: 1 tab(s) orally once a day      REVIEW OF SYSTEMS:  Constitutional: [ ] negative [+ ] fevers [ +] chills [- ] weight loss [ ] weight gain  HEENT: [ ] negative - ] dry eyes [- ] eye irritation [+ ] postnasal drip [ ] nasal congestion  CV: [ ] negative  [- ] chest pain [ -] orthopnea [- ] palpitations [ ] murmur  Resp: [ ] negative [ +] cough [ +] shortness of breath [ ] dyspnea [+ ] wheezing [ +] sputum [ ] hemoptysis  GI: [ ] negative [ -] nausea [- ] vomiting [ -] diarrhea [ ] constipation [- ] abd pain [ ] dysphagia   : [ ] negative [- ] dysuria [ -] nocturia [- ] hematuria [ ] increased urinary frequency  Musculoskeletal: [ ] negative [- ] back pain [ -] myalgias [ ] arthralgias [ ] fracture  Skin: [ ] negative [- ] rash [- ] itch  Neurological: [ ] negative [- ] headache [- ] dizziness [ ] syncope [ ] weakness [ ] numbness  Psychiatric: [ -] negative [ ] anxiety [ ] depression  Endocrine: [- ] negative [ ] diabetes [ ] thyroid problem  Hematologic/Lymphatic: [- ] negative [ ] anemia [ ] bleeding problem  Allergic/Immunologic: [ -] negative [ ] itchy eyes [ ] nasal discharge [ ] hives [ ] angioedema  [x ] All other systems negative  [ ] Unable to assess ROS because ________    OBJECTIVE:  ICU Vital Signs Last 24 Hrs  T(C): 37.2 (10 Apr 2018 14:54), Max: 37.4 (10 Apr 2018 09:45)  T(F): 98.9 (10 Apr 2018 14:54), Max: 99.3 (10 Apr 2018 09:45)  HR: 73 (10 Apr 2018 14:54) (71 - 88)  BP: 132/70 (10 Apr 2018 14:54) (111/81 - 134/73)  BP(mean): --  ABP: --  ABP(mean): --  RR: 20 (10 Apr 2018 14:54) (18 - 26)  SpO2: 96% (10 Apr 2018 14:54) (94% - 97%)        CAPILLARY BLOOD GLUCOSE          PHYSICAL EXAM:  General: NAD  HEENT: PERRLA  Lymph Nodes: No palpable cervical LNs  Neck: Supple  Respiratory: Decreased BS in R lower lung field. L CTA, no wheezing or crackles   Cardiovascular: Irregularly irregular, S1+S2  Abdomen: Soft, NT, ND, BS+  Extremities: No edema, pulses + b/l LEs  Skin: No rash   Neurological: AAOx3, grossly non focal   Psychiatry: Normal mood     HOSPITAL MEDICATIONS:  aspirin  chewable 81 milliGRAM(s) Oral daily      furosemide    Tablet 20 milliGRAM(s) Oral daily  metoprolol tartrate 25 milliGRAM(s) Oral two times a day  tamsulosin 0.4 milliGRAM(s) Oral daily  valsartan 40 milliGRAM(s) Oral daily    atorvastatin 20 milliGRAM(s) Oral at bedtime  finasteride 5 milliGRAM(s) Oral daily      oxyCODONE    5 mG/acetaminophen 325 mG 2 Tablet(s) Oral every 6 hours PRN          LABS:                        10.3   5.8   )-----------( 238      ( 10 Apr 2018 10:36 )             31.0     Hgb Trend: 10.3<--  04-10    136  |  99  |  17  ----------------------------<  105<H>  4.4   |  27  |  0.93    Ca    9.2      10 Apr 2018 10:37    TPro  7.9  /  Alb  3.9  /  TBili  0.3  /  DBili  x   /  AST  20  /  ALT  20  /  AlkPhos  51  04-10    Creatinine Trend: 0.93<--, 0.84<--, 0.84<--, 0.85<--  PT/INR - ( 10 Apr 2018 10:37 )   PT: 25.5 sec;   INR: 2.30 ratio         PTT - ( 10 Apr 2018 10:37 )  PTT:43.8 sec      Venous Blood Gas:  04-10 @ 10:41  7.38/48/29/28/48  VBG Lactate: 0.9      MICROBIOLOGY:   RVP: + enterorhinovirus    RADIOLOGY:  < from: CT Chest No Cont (04.10.18 @ 11:30) >  HEST:     LUNGS AND LARGE AIRWAYS: Patent central airways. Centrilobular emphysema.   Right lower lobe segmental compressive atelectasis secondary to adjacent   pleural effusion. No pulmonary nodules.    PLEURA: Mild interval decrease in the small to moderate loculated   right-sided pleural effusion without enhancement .    VESSELS: Coronary artery bypass graft. Atherosclerotic calcifications of  the thoracic aorta and coronary arteries.    HEART: Heart size is normal. Mitral valve annuloplasty. Aortic annular   calcifications. No pericardial effusion. Coronary artery calcifications   noted    MEDIASTINUM AND ARVIN: No lymphadenopathy.    CHEST WALL AND LOWER NECK: Within normal limits.    VISUALIZED UPPER ABDOMEN: Renal cysts.    MUSCULOSKELETAL: Sternotomy. Mild degenerative changes of the visualized   spine.    [ ] Reviewed and interpreted by me    PULMONARY FUNCTION TESTS:  None  EKG:

## 2018-04-10 NOTE — H&P ADULT - NSHPPHYSICALEXAM_GEN_ALL_CORE
General: WN/WD NAD  PERRLA  Neurology: A&Ox3, nonfocal, IQBAL x 4  Respiratory: CTA B/L  CV: RRR, S1S2, no murmurs, rubs or gallops  Abdominal: Soft, NT, ND +BS, Last BM  Extremities: No edema, + peripheral pulses  Skin Normal

## 2018-04-10 NOTE — ED PROVIDER NOTE - OBJECTIVE STATEMENT
66 yo male presenting with 2 day hx of acutely worsening cough and fever.  fever high of 101.3 this morning.  productive cough which has been going on for a few days. yellowish sputum.  did not take anything for his symptoms.        has appt tomorrow to see thoracic surgeon in [facility].    pulm- ag ochoa  pcp- huey hutchinson

## 2018-04-10 NOTE — ED ADULT NURSE NOTE - CHIEF COMPLAINT QUOTE
cough and fever, onset yesterday, discharged from here 2 weeks ago," fluids removed from lung" a week ago. with shortness of breathe

## 2018-04-10 NOTE — ED ADULT NURSE REASSESSMENT NOTE - NS ED NURSE REASSESS COMMENT FT1
Patient resting in bed with call bell within reach. Patient currently standing because he states when he lays down it is harder to breath. Lung sounds reveal rales in bilateral bases. Patient states he recently had a paracentesis performed two weeks ago. Shortness of breath was initially revealed but has since returned. vss. Will continue to monitor

## 2018-04-10 NOTE — CONSULT NOTE ADULT - SUBJECTIVE AND OBJECTIVE BOX
History of Present Illness:  67y Male    Past Medical History  Kidney stones  Hyperlipidemia  Hypertension  CAD (coronary artery disease)      Past Surgical History  H/O mitral valve replacement      MEDICATIONS  (STANDING):  aspirin  chewable 81 milliGRAM(s) Oral daily  atorvastatin 20 milliGRAM(s) Oral at bedtime  finasteride 5 milliGRAM(s) Oral daily  furosemide    Tablet 20 milliGRAM(s) Oral daily  metoprolol tartrate 25 milliGRAM(s) Oral two times a day  tamsulosin 0.4 milliGRAM(s) Oral daily  valsartan 40 milliGRAM(s) Oral daily    MEDICATIONS  (PRN):  oxyCODONE    5 mG/acetaminophen 325 mG 2 Tablet(s) Oral every 6 hours PRN Moderate Pain (4 - 6)    Antiplatelet therapy:                           Last dose/amt:    Allergies: penicillin (Flushing)      SOCIAL HISTORY:  Smoker: [ ] Yes  [ ] No        PACK YEARS:                         WHEN QUIT?  ETOH use: [ ] Yes  [ ] No              FREQUENCY / QUANTITY:  Ilicit Drug use:  [ ] Yes  [ ] No  Occupation:  Live with:  Assist device use:    Relevant Family History  FAMILY HISTORY:  No pertinent family history in first degree relatives      Review of Systems  GENERAL:  Fevers[] chills[] sweats[] fatigue[] weight loss[] weight gain []                                        NEURO:  parathesias[] seizures []  syncope []  confusion []                                                                                  EYES: glasses[]  blurry vision[]  discharge[] pain[] glaucoma []                                                                            ENMT:  difficulty hearing []  vertigo[]  dysphagia[] epistaxis[] recent dental work []                                      CV:  chest pain[] palpitations[] BROOKS [] diaphoresis [] edema[]                                                                                             RESPIRATORY:  wheezing[] SOB[] cough [] sputum[] hemoptysis[]                                                                    GI:  nausea[]  vomiting []  diarrhea[] constipation [] melena []                                                                        : hematuria[ ]  dysuria[ ] urgency[] incontinence[]                                                                                              MUSKULOSKELETAL:  arthritis[ ]  joint swelling [ ] muscle weakness [ ]                                                                  SKIN/BREAST:  rash[ ] itching [ ]  hair loss[ ] masses[ ]                                                                                                PSYCH:  dementia [ ] depresion [ ] anxiety[ ]                                                                                                                  HEME/LYMPH:  bruises easily[ ] enlarged lymph nodes[ ] tender lymph nodes[ ]                                                 ENDOCRINE:  cold intolerance[ ] heat intolerance[ ] polydipsia[ ]                                                                              PHYSICAL EXAM  Vital Signs Last 24 Hrs  T(C): 37.2 (10 Apr 2018 14:54), Max: 37.4 (10 Apr 2018 09:45)  T(F): 98.9 (10 Apr 2018 14:54), Max: 99.3 (10 Apr 2018 09:45)  HR: 73 (10 Apr 2018 14:54) (71 - 88)  BP: 132/70 (10 Apr 2018 14:54) (111/81 - 134/73)  BP(mean): --  RR: 20 (10 Apr 2018 14:54) (18 - 26)  SpO2: 96% (10 Apr 2018 14:54) (94% - 97%)    General: Well nourished, well developed, no acute distress.                                                         Neuro: Normal exam oriented to person/place & time with no focal motor or sensory  deficits.                    Eyes: Normal exam of conjunctiva & lids, pupils equally reactive.   ENT: Normal exam of nasal/oral mucosa with absence of cyanosis.   Neck: Normal exam of jugular veins, trachea & thyroid.   Chest: Normal lung exam with good air movement absence of wheezes, rales, or rhonchi:                                                                          CV:  Auscultation: normal [ ] S3[ ] S4[ ] Irregular [ ] Rub[ ] Clicks[ ]  Murmurs none:[ ]systolic [ ]  diastolic [ ] holosystolic [ ]  Carotids: No Bruits[ ] Other____________ Abdominal Aorta: normal [ ] nonpalpable[ ]                                                                         GI: Normal exam of abdomen, liver & spleen with no noted masses or tenderness.                                                                                              Extremities: Normal no evidence of cyanosis or deformity Edema: none[ ]trace[ ]1+[ ]2+[ ]3+[ ]4+[ ]  Lower Extremity Pulses: Right[ ] Left[ ]Varicosities[ ]  SKIN : Normal exam to inspection & palation.                                                           LABS:                        10.3   5.8   )-----------( 238      ( 10 Apr 2018 10:36 )             31.0     04-10    136  |  99  |  17  ----------------------------<  105<H>  4.4   |  27  |  0.93    Ca    9.2      10 Apr 2018 10:37    TPro  7.9  /  Alb  3.9  /  TBili  0.3  /  DBili  x   /  AST  20  /  ALT  20  /  AlkPhos  51  04-10    PT/INR - ( 10 Apr 2018 10:37 )   PT: 25.5 sec;   INR: 2.30 ratio         PTT - ( 10 Apr 2018 10:37 )  PTT:43.8 sec    CARDIAC MARKERS ( 10 Apr 2018 10:37 )  x     / <0.01 ng/mL / x     / x     / 67 yr old  ,alewith complaints  of increased  SOB over last couple of weeks   s/p Rt thoracentesis 2 weeks ago       Past Medical History  Kidney stones  Hyperlipidemia  Hypertension  CAD (coronary artery disease)      Past Surgical History  H/O mitral valve replacement      MEDICATIONS  (STANDING):  aspirin  chewable 81 milliGRAM(s) Oral daily  atorvastatin 20 milliGRAM(s) Oral at bedtime  finasteride 5 milliGRAM(s) Oral daily  furosemide    Tablet 20 milliGRAM(s) Oral daily  metoprolol tartrate 25 milliGRAM(s) Oral two times a day  tamsulosin 0.4 milliGRAM(s) Oral daily  valsartan 40 milliGRAM(s) Oral daily    MEDICATIONS  (PRN):  oxyCODONE    5 mG/acetaminophen 325 mG 2 Tablet(s) Oral every 6 hours PRN Moderate Pain (4 - 6)    Antiplatelet therapy:   coumadin                        Last dose/amt: 4/9    Allergies: penicillin (Flushing)      Relevant Family History  FAMILY HISTORY:  No pertinent family history in first degree relatives      Review of Systems  GENERAL:  Fevers[] chills[] sweats[] fatigue[] weight loss[] weight gain []                                        NEURO:  parathesias[] seizures []  syncope []  confusion []                                                                                  EYES: glasses[]  blurry vision[]  discharge[] pain[] glaucoma []                                                                            ENMT:  difficulty hearing []  vertigo[]  dysphagia[] epistaxis[] recent dental work []                                      CV:  chest pain[] palpitations[] BROOKS [] diaphoresis [] edema[]                                                                                             RESPIRATORY:  wheezing[] SOB[] cough [] sputum[] hemoptysis[]                                                                    GI:  nausea[]  vomiting []  diarrhea[] constipation [] melena []                                                                        : hematuria[ ]  dysuria[ ] urgency[] incontinence[]                                                                                              MUSKULOSKELETAL:  arthritis[ ]  joint swelling [ ] muscle weakness [ ]                                                                  SKIN/BREAST:  rash[ ] itching [ ]  hair loss[ ] masses[ ]                                                                                                PSYCH:  dementia [ ] depresion [ ] anxiety[ ]                                                                                                                  HEME/LYMPH:  bruises easily[ ] enlarged lymph nodes[ ] tender lymph nodes[ ]                                                 ENDOCRINE:  cold intolerance[ ] heat intolerance[ ] polydipsia[ ]                                                                              PHYSICAL EXAM  Vital Signs Last 24 Hrs  T(C): 37.2 (10 Apr 2018 14:54), Max: 37.4 (10 Apr 2018 09:45)  T(F): 98.9 (10 Apr 2018 14:54), Max: 99.3 (10 Apr 2018 09:45)  HR: 73 (10 Apr 2018 14:54) (71 - 88)  BP: 132/70 (10 Apr 2018 14:54) (111/81 - 134/73)  BP(mean): --  RR: 20 (10 Apr 2018 14:54) (18 - 26)  SpO2: 96% (10 Apr 2018 14:54) (94% - 97%)    General: Well nourished, well developed, no acute distress.                                                         Neuro: Normal exam oriented to person/place & time with no focal motor or sensory  deficits.                    Eyes: Normal exam of conjunctiva & lids, pupils equally reactive.   ENT: Normal exam of nasal/oral mucosa with absence of cyanosis.   Neck: Normal exam of jugular veins, trachea & thyroid.   Chest:Rt lung diminshed throughout                                                                      CV:  Auscultation: normal [ ] S3[ ] S4[ ] Irregular [ ] Rub[ ] Clicks[ ]  Murmurs none:[ ]systolic [ ]  diastolic [ ] holosystolic [ ]  Carotids: No Bruits[ ] Other____________ Abdominal Aorta: normal [ ] nonpalpable[ ]                                                                         GI: Normal exam of abdomen, liver & spleen with no noted masses or tenderness.                                                                                              Extremities: Normal no evidence of cyanosis or deformity Edema: none[ ]trace[ ]1+[ ]2+[ ]3+[ ]4+[ ]  Lower Extremity Pulses: Right[ ] Left[ ]Varicosities[ ]  SKIN : Normal exam to inspection & palation.                                                           LABS:                        10.3   5.8   )-----------( 238      ( 10 Apr 2018 10:36 )             31.0     04-10    136  |  99  |  17  ----------------------------<  105<H>  4.4   |  27  |  0.93    Ca    9.2      10 Apr 2018 10:37    TPro  7.9  /  Alb  3.9  /  TBili  0.3  /  DBili  x   /  AST  20  /  ALT  20  /  AlkPhos  51  04-10    PT/INR - ( 10 Apr 2018 10:37 )   PT: 25.5 sec;   INR: 2.30 ratio         PTT - ( 10 Apr 2018 10:37 )  PTT:43.8 sec    CARDIAC MARKERS ( 10 Apr 2018 10:37 )  x     / <0.01 ng/mL / x     / x     / 67 yr old  male with complaints  of increased  SOB over last couple of weeks   s/p Rt thoracentesis 2 weeks ago  with increased rt pl effusion on CT from 3 weeks ago.      Past Medical History  Kidney stones  Hyperlipidemia  Hypertension  CAD (coronary artery disease)      Past Surgical History  H/O mitral valve replacement      MEDICATIONS  (STANDING):  aspirin  chewable 81 milliGRAM(s) Oral daily  atorvastatin 20 milliGRAM(s) Oral at bedtime  finasteride 5 milliGRAM(s) Oral daily  furosemide    Tablet 20 milliGRAM(s) Oral daily  metoprolol tartrate 25 milliGRAM(s) Oral two times a day  tamsulosin 0.4 milliGRAM(s) Oral daily  valsartan 40 milliGRAM(s) Oral daily    MEDICATIONS  (PRN):  oxyCODONE    5 mG/acetaminophen 325 mG 2 Tablet(s) Oral every 6 hours PRN Moderate Pain (4 - 6)    Antiplatelet therapy:   coumadin                        Last dose/amt: 4/9    Allergies: penicillin (Flushing)      Relevant Family History  FAMILY HISTORY:  No pertinent family history in first degree relatives      Review of Systems  GENERAL: + Fevers no  chills                                    NEURO:   denies parathesias or cva                                                                                                                                                      ENMT:  denies  difficulty hearing  or                                       CV:    denies  chest pain  or palpitations  complains of BROOKS                                                                                            RESPIRATORY:   no wheezin   no   someSOB  occasional cough                                                                    GI:   d enies nausea  and   vomiting                                                                                                                                                                 MUSKULOSKELETAL:   denies arthritis  and   joint swelling                                                                                                                                                          PSYCH:   denies  dementia  and  depresion                                                                                                                                                            ENDOCRINE:   denies   cold intolerance                                                                           PHYSICAL EXAM  Vital Signs Last 24 Hrs  T(C): 37.2 (10 Apr 2018 14:54), Max: 37.4 (10 Apr 2018 09:45)  T(F): 98.9 (10 Apr 2018 14:54), Max: 99.3 (10 Apr 2018 09:45)  HR: 73 (10 Apr 2018 14:54) (71 - 88)  BP: 132/70 (10 Apr 2018 14:54) (111/81 - 134/73)  BP(mean): --  RR: 20 (10 Apr 2018 14:54) (18 - 26)  SpO2: 96% (10 Apr 2018 14:54) (94% - 97%)    General: Well nourished, well developed, no acute distress.                                                         Neuro: Normal exam oriented to person/place & time with no focal motor or sensory  deficits.                    Eyes: Normal exam of conjunctiva & lids, pupils equally reactive.   ENT: Normal exam of nasal/oral mucosa with absence of cyanosis.   Neck: Normal exam of jugular veins, trachea & thyroid.   Chest:Rt lung diminshed throughout                                                                      CV:  Auscultation: normal [x ]  Carotids: No BruitsOther_______                                                                       GI: Normal exam of abdomen, liver & spleen with no noted masses or tenderness.                                                                                              Extremities: Normal no evidence of cyanosis or deformity Edema: non  Lower Extremity Pulses: Right[ ] Left[ ]Varicosities[ ]  SKIN : Normal exam to inspection & palation.                                                           LABS:                        10.3   5.8   )-----------( 238      ( 10 Apr 2018 10:36 )             31.0     04-10    136  |  99  |  17  ----------------------------<  105<H>  4.4   |  27  |  0.93    Ca    9.2      10 Apr 2018 10:37    TPro  7.9  /  Alb  3.9  /  TBili  0.3  /  DBili  x   /  AST  20  /  ALT  20  /  AlkPhos  51  04-10    PT/INR - ( 10 Apr 2018 10:37 )   PT: 25.5 sec;   INR: 2.30 ratio         PTT - ( 10 Apr 2018 10:37 )  PTT:43.8 sec    CARDIAC MARKERS ( 10 Apr 2018 10:37 )  x     / <0.01 ng/mL / x     / x     /

## 2018-04-10 NOTE — ED ADULT NURSE NOTE - OBJECTIVE STATEMENT
0945 67 yr old  c/o shortness of breath and dry cough. Recent hospitalization 2 wks ago for tx of pleural effusion. tachypneic. Breath sounds decreased at right base, expiratory wheezes, dry cough. denies fever or chills. A&Ox3. color pink. skin W&D. Denies chest pain, dizziness or palp. droplet isolation initiated

## 2018-04-11 ENCOUNTER — TRANSCRIPTION ENCOUNTER (OUTPATIENT)
Age: 68
End: 2018-04-11

## 2018-04-11 DIAGNOSIS — J98.01 ACUTE BRONCHOSPASM: ICD-10-CM

## 2018-04-11 DIAGNOSIS — Z29.9 ENCOUNTER FOR PROPHYLACTIC MEASURES, UNSPECIFIED: ICD-10-CM

## 2018-04-11 DIAGNOSIS — R93.8 ABNORMAL FINDINGS ON DIAGNOSTIC IMAGING OF OTHER SPECIFIED BODY STRUCTURES: ICD-10-CM

## 2018-04-11 DIAGNOSIS — I48.91 UNSPECIFIED ATRIAL FIBRILLATION: ICD-10-CM

## 2018-04-11 DIAGNOSIS — E66.9 OBESITY, UNSPECIFIED: ICD-10-CM

## 2018-04-11 DIAGNOSIS — Z77.090 CONTACT WITH AND (SUSPECTED) EXPOSURE TO ASBESTOS: ICD-10-CM

## 2018-04-11 LAB
APTT BLD: 40 SEC — HIGH (ref 27.5–37.4)
BLD GP AB SCN SERPL QL: NEGATIVE — SIGNIFICANT CHANGE UP
INR BLD: 1.97 RATIO — HIGH (ref 0.88–1.16)
PROTHROM AB SERPL-ACNC: 21.8 SEC — HIGH (ref 9.8–12.7)
RH IG SCN BLD-IMP: POSITIVE — SIGNIFICANT CHANGE UP

## 2018-04-11 PROCEDURE — 99233 SBSQ HOSP IP/OBS HIGH 50: CPT

## 2018-04-11 PROCEDURE — 99232 SBSQ HOSP IP/OBS MODERATE 35: CPT

## 2018-04-11 RX ADMIN — VALSARTAN 40 MILLIGRAM(S): 80 TABLET ORAL at 06:13

## 2018-04-11 RX ADMIN — Medication 20 MILLIGRAM(S): at 06:13

## 2018-04-11 RX ADMIN — Medication 25 MILLIGRAM(S): at 06:13

## 2018-04-11 RX ADMIN — Medication 3 MILLILITER(S): at 06:13

## 2018-04-11 RX ADMIN — TAMSULOSIN HYDROCHLORIDE 0.4 MILLIGRAM(S): 0.4 CAPSULE ORAL at 11:43

## 2018-04-11 RX ADMIN — BUDESONIDE AND FORMOTEROL FUMARATE DIHYDRATE 2 PUFF(S): 160; 4.5 AEROSOL RESPIRATORY (INHALATION) at 17:44

## 2018-04-11 RX ADMIN — ATORVASTATIN CALCIUM 20 MILLIGRAM(S): 80 TABLET, FILM COATED ORAL at 00:30

## 2018-04-11 RX ADMIN — ATORVASTATIN CALCIUM 20 MILLIGRAM(S): 80 TABLET, FILM COATED ORAL at 23:04

## 2018-04-11 RX ADMIN — BUDESONIDE AND FORMOTEROL FUMARATE DIHYDRATE 2 PUFF(S): 160; 4.5 AEROSOL RESPIRATORY (INHALATION) at 06:13

## 2018-04-11 RX ADMIN — Medication 3 MILLILITER(S): at 00:30

## 2018-04-11 RX ADMIN — Medication 3 MILLILITER(S): at 23:04

## 2018-04-11 RX ADMIN — Medication 3 MILLILITER(S): at 13:55

## 2018-04-11 RX ADMIN — FINASTERIDE 5 MILLIGRAM(S): 5 TABLET, FILM COATED ORAL at 11:43

## 2018-04-11 RX ADMIN — Medication 3 MILLILITER(S): at 17:44

## 2018-04-11 RX ADMIN — Medication 81 MILLIGRAM(S): at 11:43

## 2018-04-11 NOTE — DISCHARGE NOTE ADULT - CARE PLAN
Principal Discharge DX:	Abnormal CT of the chest  Goal:	Symptoms improved s/p VATS, chest tubes removed, Post cxr cleared  Assessment and plan of treatment:	Please follow up with  within 7-10days  Secondary Diagnosis:	Atrial fibrillation, unspecified type  Assessment and plan of treatment:	Please discontinue Coumadin per  and follow with md in 7 days or less   Atrial fibrillation is the most common heart rhythm problem.  The condition puts you at risk for has stroke and heart attack  It helps if you control your blood pressure, not drink more than 1-2 alcohol drinks per day, cut down on caffeine, getting treatment for over active thyroid gland, and get regular exercise  Call your doctor if you feel your heart racing or beating unusually, chest tightness or pain, lightheaded, faint, shortness of breath especially with exercise  It is important to take your heart medication as prescribed  You may be on anticoagulation which is very important to take as directed - you may need blood work to monitor drug levels  Secondary Diagnosis:	Pure hyperglyceridemia  Assessment and plan of treatment:	Continue all meds as ordered on this discharge paperwork

## 2018-04-11 NOTE — PROGRESS NOTE ADULT - ATTENDING COMMENTS
as above-  s/p  thoro -- re-initiate AC and likely set up for DC    Out pt films-old CT to be reviewed and compared to new films-may need repeat CT after fluid removal.  Will see in office next week-no earlier than Tuesday to await results of pleural fluid    Chirag Flynn MD-Pulmonary   [phone] as above-  s/p prior non-dx  thoro -- re-admit w/recurrent effusion for VATS  (DDX-asbestos related, lung CA, empyema, lymphoma)  VATS-4/12--consider correcting w/ vit K (INR)  Bronchodilator rx as above    Chirag Flynn MD-Pulmonary   [phone]

## 2018-04-11 NOTE — PROGRESS NOTE ADULT - PROBLEM SELECTOR PLAN 1
multifactorial-overwight, cardiac dz, effusion (restrictive dysfunction) s/p thorocentesis by--radiology   prior pleural -cyto-negative prior  CTS Sue to take to OR for VATS bx

## 2018-04-11 NOTE — PROGRESS NOTE ADULT - SUBJECTIVE AND OBJECTIVE BOX
Subjective: Pt states "I feel better" denies any CP, SOB, N/V and palpitations. No acute events overnight.     Vital Signs Last 24 Hrs  T(C): 37.3 (04-11-18 @ 05:32), Max: 37.3 (04-10-18 @ 18:49)  T(F): 99.1 (04-11-18 @ 05:32), Max: 99.1 (04-10-18 @ 18:49)  HR: 75 (04-11-18 @ 05:32) (73 - 90)  BP: 115/72 (04-11-18 @ 05:32) (115/72 - 135/86)  RR: 19 (04-11-18 @ 05:32) (19 - 26)  SpO2: 92% (04-11-18 @ 05:32) (92% - 97%)         04-11-18 @ 07:01  -  04-11-18 @ 12:11  --------------------------------------------------------  IN: 240 mL / OUT: 0 mL / NET: 240 mL    Relevant labs, radiology and Medications reviewed                        10.3   5.8   )-----------( 238      ( 10 Apr 2018 10:36 )             31.0     136  |  99  |  17  ----------------------------<  105<H>  4.4   |  27  |  0.93    Prothrombin Time and INR, Plasma in AM (04.11.18 @ 06:37)    Prothrombin Time, Plasma: 21.8: Effective March 21st, the reference range for PT has changed. sec    INR: 1.97    PHYSICAL EXAM  Neurology: A&Ox3, NAD, no gross deficits  CV : RRR+S1S2  Lungs: Respirations non-labored, B/L BS clear, diminished at bases  Abdomen: Soft, NT/ND, +BSx4Q, +BM  : Voiding without difficulty  Extremities: B/L LE no edema, negative calf tenderness, +PP           MEDICATIONS  ALBUTerol/ipratropium for Nebulization 3 milliLiter(s) Nebulizer every 6 hours  aspirin  chewable 81 milliGRAM(s) Oral daily  atorvastatin 20 milliGRAM(s) Oral at bedtime  buDESOnide  80 MICROgram(s)/formoterol 4.5 MICROgram(s) Inhaler 2 Puff(s) Inhalation two times a day  enoxaparin Injectable 40 milliGRAM(s) SubCutaneous every 24 hours  finasteride 5 milliGRAM(s) Oral daily  furosemide    Tablet 20 milliGRAM(s) Oral daily  metoprolol tartrate 25 milliGRAM(s) Oral two times a day  ondansetron Injectable 4 milliGRAM(s) IV Push every 6 hours PRN  oxyCODONE    5 mG/acetaminophen 325 mG 2 Tablet(s) Oral every 6 hours PRN  tamsulosin 0.4 milliGRAM(s) Oral daily  valsartan 40 milliGRAM(s) Oral daily      Discussed with thoracic surgery attending, Dr. Rogers. Subjective: Pt states "I feel better" denies any CP, SOB, N/V and palpitations. No acute events overnight.     Vital Signs Last 24 Hrs  T(C): 37.3 (04-11-18 @ 05:32), Max: 37.3 (04-10-18 @ 18:49)  T(F): 99.1 (04-11-18 @ 05:32), Max: 99.1 (04-10-18 @ 18:49)  HR: 75 (04-11-18 @ 05:32) (73 - 90)  BP: 115/72 (04-11-18 @ 05:32) (115/72 - 135/86)  RR: 19 (04-11-18 @ 05:32) (19 - 26)  SpO2: 92% (04-11-18 @ 05:32) (92% - 97%)         04-11-18 @ 07:01  -  04-11-18 @ 12:11  --------------------------------------------------------  IN: 240 mL / OUT: 0 mL / NET: 240 mL    Relevant labs, radiology and Medications reviewed                        10.3   5.8   )-----------( 238      ( 10 Apr 2018 10:36 )             31.0     136  |  99  |  17  ----------------------------<  105<H>  4.4   |  27  |  0.93    Prothrombin Time and INR, Plasma in AM (04.11.18 @ 06:37)    Prothrombin Time, Plasma: 21.8: Effective March 21st, the reference range for PT has changed. sec    INR: 1.97    PHYSICAL EXAM  Neurology: A&Ox3, NAD, no gross deficits  CV : RRR+S1S2  Lungs: Respirations non-labored, B/L BS clear, diminished R>L at bases  Abdomen: Soft, NT/ND, +BSx4Q, +BM  : Voiding without difficulty  Extremities: B/L LE no edema, negative calf tenderness, +PP           MEDICATIONS  ALBUTerol/ipratropium for Nebulization 3 milliLiter(s) Nebulizer every 6 hours  aspirin  chewable 81 milliGRAM(s) Oral daily  atorvastatin 20 milliGRAM(s) Oral at bedtime  buDESOnide  80 MICROgram(s)/formoterol 4.5 MICROgram(s) Inhaler 2 Puff(s) Inhalation two times a day  enoxaparin Injectable 40 milliGRAM(s) SubCutaneous every 24 hours  finasteride 5 milliGRAM(s) Oral daily  furosemide    Tablet 20 milliGRAM(s) Oral daily  metoprolol tartrate 25 milliGRAM(s) Oral two times a day  ondansetron Injectable 4 milliGRAM(s) IV Push every 6 hours PRN  oxyCODONE    5 mG/acetaminophen 325 mG 2 Tablet(s) Oral every 6 hours PRN  tamsulosin 0.4 milliGRAM(s) Oral daily  valsartan 40 milliGRAM(s) Oral daily      Discussed with thoracic surgery attending, Dr. Rogers.

## 2018-04-11 NOTE — DISCHARGE NOTE ADULT - HOSPITAL COURSE
66 yo male presenting with 2 day hx of acutely worsening cough and fever.  fever high of 101.3 this morning.  productive cough which has been going on for a few days. yellowish sputum.  did not take anything for his symptoms      Acute febrile illness.  Plan: fredrick viral  ID fu appreciated  will monitor.      Pleural effusion.  Plan: pulmonary fu  sp VATS   chest tube removed : pt cleared by ct surgery, f/u w Dr.Hyman LINO fib Plan cards hold coumadin per

## 2018-04-11 NOTE — CONSULT NOTE ADULT - ASSESSMENT
67 M with a PMH of HTN, HLD, CAD, diastolic dysfunction,  s/p MV annuloplasty, A fib on coumadin, s/p recent hospital admission from 3/25-3/29/18 during which he underwent a R thoracentesis with removal of 1 L. The pleural fluid studies were consistent with exudative fluid , cytopath was negative for malignancy, cultures negative.  He now returns with complaints of fever of 101 F for 1-2 days along with a cough productive of clear and occasionally yellow sputum as well as worsening shortness of breath. He reports that the shortness of breath has been gradually worsening for the past 1.5 week. His exercise tolerance has been increasingly limited due to the SOB. He denies any chest pain, nausea, vomiting, diarrhea dysuria or hematuria but does report mild intermittent wheezing. An RVP was positive for entero rhino virus and a Ct chest showed increase in a small to moderate loculated R pleural effusion.     ER vitals:  T 98.4, P 88, /81, RR 26, 97% RA.   Pt was given a dose of vanco/levaquin in the ER.  Pt seen by thoracic surgery and is awaiting R VATS with pleural drainage and biopsy.     Problem/Plan - 1:    ·	Upper respiratory viral infection    - RVP (+) entero/rhinovirus.  No role for antivirals, cont supportive care including duonebs, cont symbicort.  Consider steroids if pt with worsening wheezing    - Do not suspect superimposed bacterial pneumonia. Will monitor off abx for now.  Pt without fever or leukocytosis.  No purulent phlegm.       Problem/Plan - 2:    ·	Right pleural effusion    - Increased from last admission.  Pending VATS, CTS following    - f/u pleural fluid analysis, and culture data    Will follow,    Josselin Echeverriai    [phone]

## 2018-04-11 NOTE — PROGRESS NOTE ADULT - ASSESSMENT
67M Afib on AC coumadin now held H , HTN, CAD s/p CABG, MVR , here  for progressive dyspnea on exertion   s/p Rt thoracentesis 2 weeks with reoccurence found on chest Ct today with Small right pleural effusion without enhancement to suggest empyema and Adjacent partial right lower lobe atelectasis. 67M with PMHx of Afib on Coumadin, HTN, CAD s/p CABG, MVR, admitted for progressive BROOKS s/p Rt thoracentesis 2 weeks ago. Pt s/p Chest CT today with reoccurrence of small right pleural effusion without enhancement to suggest empyema and adjacent partial right lower lobe atelectasis. Thoracic Surgery consulted for Right VATS pleural biopsy and drainage of pleural effusion.

## 2018-04-11 NOTE — PROGRESS NOTE ADULT - PROBLEM SELECTOR PROBLEM 3
Coronary artery disease involving native heart without angina pectoris, unspecified vessel or lesion type Obesity

## 2018-04-11 NOTE — PROGRESS NOTE ADULT - ASSESSMENT
66 yo male presenting with 2 day hx of acutely worsening cough and fever.  fever high of 101.3 this morning.  productive cough which has been going on for a few days. yellowish sputum.  did not take anything for his symptoms     Problem/Plan - 1:  ·  Problem: Acute febrile illness.  Plan: fredrick viral  ID fu appreciated  will monitor.     Problem/Plan - 2:  ·  Problem: Pleural effusion.  Plan: pulmonary fu  cts fu for tap pending inr to come down

## 2018-04-11 NOTE — DISCHARGE NOTE ADULT - MEDICATION SUMMARY - MEDICATIONS TO STOP TAKING
I will STOP taking the medications listed below when I get home from the hospital:    warfarin 5 mg oral tablet  -- 1 tab(s) by mouth at bedtime    valsartan 40 mg oral tablet  -- 1 tab(s) by mouth once a day    oxyCODONE-acetaminophen 5 mg-325 mg oral tablet  -- 2 tab(s) by mouth every 6 hours MDD:8  -- Caution federal law prohibits the transfer of this drug to any person other  than the person for whom it was prescribed.  May cause drowsiness.  Alcohol may intensify this effect.  Use care when operating dangerous machinery.  This prescription cannot be refilled.  This product contains acetaminophen.  Do not use  with any other product containing acetaminophen to prevent possible liver damage.  Using more of this medication than prescribed may cause serious breathing problems.    Metoprolol Tartrate 25 mg oral tablet  -- 0.5 tab(s) by mouth 2 times a day    Lasix 20 mg oral tablet  -- 1 tab(s) by mouth once a day    warfarin 3 mg oral tablet  -- 1 tab(s) by mouth once a day I will STOP taking the medications listed below when I get home from the hospital:    warfarin 5 mg oral tablet  -- 1 tab(s) by mouth at bedtime    valsartan 40 mg oral tablet  -- 1 tab(s) by mouth once a day    oxyCODONE-acetaminophen 5 mg-325 mg oral tablet  -- 2 tab(s) by mouth every 6 hours MDD:8  -- Caution federal law prohibits the transfer of this drug to any person other  than the person for whom it was prescribed.  May cause drowsiness.  Alcohol may intensify this effect.  Use care when operating dangerous machinery.  This prescription cannot be refilled.  This product contains acetaminophen.  Do not use  with any other product containing acetaminophen to prevent possible liver damage.  Using more of this medication than prescribed may cause serious breathing problems.    Lasix 20 mg oral tablet  -- 1 tab(s) by mouth once a day    warfarin 3 mg oral tablet  -- 1 tab(s) by mouth once a day

## 2018-04-11 NOTE — PROGRESS NOTE ADULT - PROBLEM SELECTOR PLAN 2
s/p thorocentesis by--radiology    send for -cyto, ph, cell count, chemistries, ph, cultures  CXR-no ptx symbicort 160 2 bid  duoneb via N q 6h

## 2018-04-11 NOTE — DISCHARGE NOTE ADULT - MEDICATION SUMMARY - MEDICATIONS TO CHANGE
I will SWITCH the dose or number of times a day I take the medications listed below when I get home from the hospital:    Flomax 0.4 mg oral capsule  -- 1 cap(s) by mouth once a day    finasteride 5 mg oral tablet  -- 1 tab(s) by mouth once a day    finasteride 5 mg oral tablet  -- 1 tab(s) by mouth once a day

## 2018-04-11 NOTE — PROGRESS NOTE ADULT - SUBJECTIVE AND OBJECTIVE BOX
Patient is a 67y old  Male who presents with a chief complaint of fever and cough (11 Apr 2018 10:02)      INTERVAL HPI/OVERNIGHT EVENTS:  T(C): 36.9 (04-11-18 @ 13:12), Max: 37.3 (04-11-18 @ 05:32)  HR: 84 (04-11-18 @ 17:38) (71 - 84)  BP: 115/69 (04-11-18 @ 17:38) (101/61 - 116/61)  RR: 18 (04-11-18 @ 17:38) (18 - 20)  SpO2: 94% (04-11-18 @ 17:38) (92% - 95%)  Wt(kg): --  I&O's Summary    10 Apr 2018 07:01  -  11 Apr 2018 07:00  --------------------------------------------------------  IN: 240 mL / OUT: 0 mL / NET: 240 mL    11 Apr 2018 07:01  -  11 Apr 2018 18:54  --------------------------------------------------------  IN: 480 mL / OUT: 0 mL / NET: 480 mL        LABS:                        10.3   5.8   )-----------( 238      ( 10 Apr 2018 10:36 )             31.0     04-10    136  |  99  |  17  ----------------------------<  105<H>  4.4   |  27  |  0.93    Ca    9.2      10 Apr 2018 10:37    TPro  7.9  /  Alb  3.9  /  TBili  0.3  /  DBili  x   /  AST  20  /  ALT  20  /  AlkPhos  51  04-10    PT/INR - ( 11 Apr 2018 06:37 )   PT: 21.8 sec;   INR: 1.97 ratio         PTT - ( 11 Apr 2018 06:37 )  PTT:40.0 sec    CAPILLARY BLOOD GLUCOSE                MEDICATIONS  (STANDING):  ALBUTerol/ipratropium for Nebulization 3 milliLiter(s) Nebulizer every 6 hours  aspirin  chewable 81 milliGRAM(s) Oral daily  atorvastatin 20 milliGRAM(s) Oral at bedtime  buDESOnide  80 MICROgram(s)/formoterol 4.5 MICROgram(s) Inhaler 2 Puff(s) Inhalation two times a day  enoxaparin Injectable 40 milliGRAM(s) SubCutaneous every 24 hours  finasteride 5 milliGRAM(s) Oral daily  furosemide    Tablet 20 milliGRAM(s) Oral daily  metoprolol tartrate 25 milliGRAM(s) Oral two times a day  tamsulosin 0.4 milliGRAM(s) Oral daily  valsartan 40 milliGRAM(s) Oral daily    MEDICATIONS  (PRN):  ondansetron Injectable 4 milliGRAM(s) IV Push every 6 hours PRN Nausea  oxyCODONE    5 mG/acetaminophen 325 mG 2 Tablet(s) Oral every 6 hours PRN Moderate Pain (4 - 6)          PHYSICAL EXAM:  GENERAL: NAD, well-groomed, well-developed  HEAD:  Atraumatic, Normocephalic  CHEST/LUNG: Clear to percussion bilaterally; No rales, rhonchi, wheezing, or rubs  HEART: Regular rate and rhythm; No murmurs, rubs, or gallops  ABDOMEN: Soft, Nontender, Nondistended; Bowel sounds present  EXTREMITIES:  2+ Peripheral Pulses, No clubbing, cyanosis, or edema  LYMPH: No lymphadenopathy noted  SKIN: No rashes or lesions    Care Discussed with Consultants/Other Providers [ ] YES  [ ] NO

## 2018-04-11 NOTE — PROGRESS NOTE ADULT - PROBLEM SELECTOR PLAN 6
prior lung nodules--will review CT (old) compared to new-after fluid removed GI prophylaxis:  PPI pre breakfast  aspiration precautions-all meals are to OOB as able

## 2018-04-11 NOTE — DISCHARGE NOTE ADULT - CARE PROVIDERS DIRECT ADDRESSES
[email or web address]

## 2018-04-11 NOTE — DISCHARGE NOTE ADULT - PATIENT PORTAL LINK FT
You can access the [facility] Portal, offered by [facility], by registering with the following website: [email or web address]

## 2018-04-11 NOTE — DISCHARGE NOTE ADULT - MEDICATION SUMMARY - MEDICATIONS TO TAKE
I will START or STAY ON the medications listed below when I get home from the hospital:    finasteride 5 mg oral tablet  -- 1 tab(s) by mouth once a day  -- Indication: For Bph    aspirin 81 mg oral tablet  -- 1 tab(s) by mouth once a day  -- Indication: For Prophylactic measure    Flomax 0.4 mg oral capsule  -- 2 cap(s) by mouth once a day  -- Indication: For Bph    Crestor 20 mg oral tablet  -- 1 tab(s) by mouth once a day (at bedtime)  -- Indication: For HLD    ProAir HFA 90 mcg/inh inhalation aerosol  -- 2 puff(s) inhaled every 6-8 hours, As Needed  -- Indication: For Asbestos exposure    guaiFENesin 100 mg/5 mL oral liquid  -- 5 milliliter(s) by mouth every 6 hours, As needed, Cough  -- Indication: For COUGH    Flax Oil oral capsule  -- Indication: For Prophylactic measure    Dymista 137 mcg-50 mcg/inh nasal spray  -- 1 spray(s) into nose once a day, As Needed  -- Indication: For Asbestos exposure    Lovaza 1000 mg oral capsule  -- 2 cap(s) by mouth 2 times a day  -- Indication: For Prophylactic measure    Foltx oral tablet  -- 1 tab(s) by mouth once a day  -- Indication: For Prophylactic measure    Vitamin D3 1000 intl units oral tablet  -- 2 tab(s) by mouth 2 times a day  -- Indication: For Prophylactic measure I will START or STAY ON the medications listed below when I get home from the hospital:    finasteride 5 mg oral tablet  -- 1 tab(s) by mouth once a day  -- Indication: For Bph    aspirin 81 mg oral tablet  -- 1 tab(s) by mouth once a day  -- Indication: For Prophylactic measure    Flomax 0.4 mg oral capsule  -- 2 cap(s) by mouth once a day  -- Indication: For Bph    Crestor 20 mg oral tablet  -- 1 tab(s) by mouth once a day (at bedtime)  -- Indication: For HLD    Metoprolol Tartrate 25 mg oral tablet  -- 0.5 tab(s) by mouth 2 times a day  -- Indication: For HTN    ProAir HFA 90 mcg/inh inhalation aerosol  -- 2 puff(s) inhaled every 6-8 hours, As Needed  -- Indication: For Asbestos exposure    guaiFENesin 100 mg/5 mL oral liquid  -- 5 milliliter(s) by mouth every 6 hours, As needed, Cough  -- Indication: For COUGH    Flax Oil oral capsule  -- Indication: For Prophylactic measure    Dymista 137 mcg-50 mcg/inh nasal spray  -- 1 spray(s) into nose once a day, As Needed  -- Indication: For Asbestos exposure    Lovaza 1000 mg oral capsule  -- 2 cap(s) by mouth 2 times a day  -- Indication: For Prophylactic measure    Foltx oral tablet  -- 1 tab(s) by mouth once a day  -- Indication: For Prophylactic measure    Vitamin D3 1000 intl units oral tablet  -- 2 tab(s) by mouth 2 times a day  -- Indication: For Prophylactic measure

## 2018-04-11 NOTE — DISCHARGE NOTE ADULT - CARE PROVIDER_API CALL
Isaiah Siegel), Internal Medicine  [address], [place] [id number]  Phone: [phone]  Fax: [phone]    Rajinder Rogers), Thoracic Surgery  [address] [place] [address]  [place] [address]  Phone: [phone]  Fax: [phone]    Chirag Flynn), Internal Medicine; Pulmonary Disease  [address] [address]  Phone: [phone]  Fax: [phone]

## 2018-04-11 NOTE — PROGRESS NOTE ADULT - PROBLEM SELECTOR PLAN 1
OR with Dr. Rogers for Rt Vats  pleural bx & drainage of rt pl effusion. OR with Dr. Rogers for Rt Vats  pleural bx & drainage of rt pl effusion currently scheduled for Friday 4/13.

## 2018-04-11 NOTE — PROGRESS NOTE ADULT - ASSESSMENT
67 M with a PMH of HTN, HLD, CAD, diastolic dysfunction,  s/p MV annuloplasty, A fib on coumadin, s/p recent hospitalization for SOB with draiange of R pleural effusion, now returning with enterorhino virus infection and SOB with a persistent loculated R pleural effusion mildly increased in size.     1. R pleural Effusion:  - Pt seen by thoracic surgery, plan for VATs with biopsy on Thursday  - Currently saturating well on RA  - Would suggest Duonebs Q6H  - Currently no wheezing on exam, however if he continues to have intermittent wheezing would suggest Prednisone 40 mg daily x 5 days  - Can start Symbicort BID  - Chest PT, incentive spirometry/ acapella Q6H  - Would hold AC in anticipation of OR. May require a heparin gtt once INR <2    2. CAD/ HTN:  - Cont ASA, Lipitor, lasix, lopressor and valsartan     3. Gen:  - DVT Px: On coumadin   - Dr. Flynn will follow pt during hospitalization 67 M with a PMH of HTN, HLD, CAD, diastolic dysfunction,  s/p MV annuloplasty, A fib on coumadin, s/p recent hospitalization for SOB with draiange of R pleural effusion, now returning with enterorhino virus infection and SOB with a persistent loculated R pleural effusion mildly increased in size.     Recurring R pleural Effusion:  - Pt seen by thoracic surgery, plan for VATs with biopsy on Thursday  - Currently saturating well on RA  - Would suggest Duonebs Q6H  - Can start Symbicort BID  - Chest PT, incentive spirometry/ acapella Q6H  - Would hold AC in anticipation of OR. May require a heparin gtt once INR <2--consider vit. K

## 2018-04-11 NOTE — PROVIDER CONTACT NOTE (OTHER) - ACTION/TREATMENT ORDERED:
Educate patient pros and cons of taking anticoagulation therapy. Patient scheduled for VATS procedure thursday

## 2018-04-11 NOTE — CONSULT NOTE ADULT - SUBJECTIVE AND OBJECTIVE BOX
Patient is a 67y old  Male who presents with a chief complaint of fever and cough (11 Apr 2018 10:02)      HPI:    67 M with a PMH of HTN, HLD, CAD, diastolic dysfunction,  s/p MV annuloplasty, A fib on coumadin, s/p recent hospital admission from 3/25-3/29/18 during which he underwent a R thoracentesis with removal of 1 L. The pleural fluid studies were consistent with exudative fluid , cytopath was negative for malignancy, cultures negative.  He now returns with complaints of fever of 101 F for 1-2 days along with a cough productive of clear and occasionally yellow sputum as well as worsening shortness of breath. He reports that the shortness of breath has been gradually worsening for the past 1.5 week. His exercise tolerance has been increasingly limited due to the SOB. He denies any chest pain, nausea, vomiting, diarrhea dysuria or hematuria but does report mild intermittent wheezing. An RVP was positive for entero rhino virus and a Ct chest showed increase in a small to moderate loculated R pleural effusion.     ER vitals:  T 98.4, P 88, /81, RR 26, 97% RA.            REVIEW OF SYSTEMS:    CONSTITUTIONAL: No fever, weight loss, or fatigue  EYES: No eye pain, visual disturbances, or discharge  ENMT:  No sore throat  NECK: No pain or stiffness  RESPIRATORY: No cough, wheezing, chills or hemoptysis; No shortness of breath  CARDIOVASCULAR: No chest pain, palpitations, dizziness, or leg swelling  GASTROINTESTINAL: No abdominal or epigastric pain. No nausea, vomiting, or hematemesis; No diarrhea or constipation. No melena or hematochezia.  GENITOURINARY: No dysuria, frequency, hematuria, or incontinence  NEUROLOGICAL: No headaches, memory loss, loss of strength, numbness, or tremors  SKIN: No itching, burning, rashes, or lesions   LYMPH NODES: No enlarged glands  MUSCULOSKELETAL: No joint pain or swelling; No muscle, back, or extremity pain      PAST MEDICAL & SURGICAL HISTORY:  Kidney stones  Hyperlipidemia  Hypertension  CAD (coronary artery disease)  H/O mitral valve replacement      Allergies    penicillin (Flushing)    Intolerances        FAMILY HISTORY:  No pertinent family history in first degree relatives      SOCIAL HISTORY:        MEDICATIONS  (STANDING):  ALBUTerol/ipratropium for Nebulization 3 milliLiter(s) Nebulizer every 6 hours  aspirin  chewable 81 milliGRAM(s) Oral daily  atorvastatin 20 milliGRAM(s) Oral at bedtime  buDESOnide  80 MICROgram(s)/formoterol 4.5 MICROgram(s) Inhaler 2 Puff(s) Inhalation two times a day  enoxaparin Injectable 40 milliGRAM(s) SubCutaneous every 24 hours  finasteride 5 milliGRAM(s) Oral daily  furosemide    Tablet 20 milliGRAM(s) Oral daily  metoprolol tartrate 25 milliGRAM(s) Oral two times a day  tamsulosin 0.4 milliGRAM(s) Oral daily  valsartan 40 milliGRAM(s) Oral daily    MEDICATIONS  (PRN):  ondansetron Injectable 4 milliGRAM(s) IV Push every 6 hours PRN Nausea  oxyCODONE    5 mG/acetaminophen 325 mG 2 Tablet(s) Oral every 6 hours PRN Moderate Pain (4 - 6)      Vital Signs Last 24 Hrs  T(C): 37.3 (11 Apr 2018 05:32), Max: 37.3 (10 Apr 2018 18:49)  T(F): 99.1 (11 Apr 2018 05:32), Max: 99.1 (10 Apr 2018 18:49)  HR: 75 (11 Apr 2018 05:32) (73 - 90)  BP: 115/72 (11 Apr 2018 05:32) (115/72 - 135/86)  BP(mean): --  RR: 19 (11 Apr 2018 05:32) (19 - 26)  SpO2: 92% (11 Apr 2018 05:32) (92% - 97%)    PHYSICAL EXAM:    GENERAL: NAD, well-groomed  HEAD:  Atraumatic, Normocephalic  EYES: EOMI, PERRLA, conjunctiva and sclera clear  ENMT: No tonsillar erythema, exudates, or enlargement; Moist mucous membranes  NECK: Supple, No JVD  CHEST/LUNG: Clear to percussion bilaterally; No rales, rhonchi, mild expiratory wheezing b/l  HEART: Regular rate and rhythm; No murmurs, rubs, or gallops  ABDOMEN: Soft, Nontender, Nondistended; Bowel sounds present  EXTREMITIES:  2+ Peripheral Pulses, No clubbing, cyanosis, or edema  LYMPH: No lymphadenopathy noted  SKIN: No rashes or lesions    LABS:  CBC Full  -  ( 10 Apr 2018 10:36 )  WBC Count : 5.8 K/uL  Hemoglobin : 10.3 g/dL  Hematocrit : 31.0 %  Platelet Count - Automated : 238 K/uL  Mean Cell Volume : 88.0 fl  Mean Cell Hemoglobin : 29.2 pg  Mean Cell Hemoglobin Concentration : 33.2 gm/dL  Auto Neutrophil # : 3.6 K/uL  Auto Lymphocyte # : 1.0 K/uL  Auto Monocyte # : 0.8 K/uL  Auto Eosinophil # : 0.4 K/uL  Auto Basophil # : 0.0 K/uL  Auto Neutrophil % : 61.5 %  Auto Lymphocyte % : 16.9 %  Auto Monocyte % : 14.6 %  Auto Eosinophil % : 6.3 %  Auto Basophil % : 0.7 %      04-10    136  |  99  |  17  ----------------------------<  105<H>  4.4   |  27  |  0.93    Ca    9.2      10 Apr 2018 10:37    TPro  7.9  /  Alb  3.9  /  TBili  0.3  /  DBili  x   /  AST  20  /  ALT  20  /  AlkPhos  51  04-10      LIVER FUNCTIONS - ( 10 Apr 2018 10:37 )  Alb: 3.9 g/dL / Pro: 7.9 g/dL / ALK PHOS: 51 U/L / ALT: 20 U/L RC / AST: 20 U/L / GGT: x                 MICROBIOLOGY:      Culture - Body Fluid with Gram Stain (03.28.18 @ 20:47)    Gram Stain:   Rare polymorphonuclear leukocytes per low power field  No organisms seen per oil power field    Specimen Source: Paracentesis Thoracentesis fluid    Culture Results:   No growth    Culture - Acid Fast - Body Fluid w/Smear (03.28.18 @ 20:47)    Specimen Source: .Body Fluid Peritoneal Fluid    Acid Fast Bacilli Smear:   No acid fast bacilli seen by fluorochrome stain    Culture Results:   No growth at 1 week.                  RADIOLOGY: Patient is a 67y old  Male who presents with a chief complaint of fever and cough (11 Apr 2018 10:02)      HPI:    67 M with a PMH of HTN, HLD, CAD, diastolic dysfunction,  s/p MV annuloplasty, A fib on coumadin, s/p recent hospital admission from 3/25-3/29/18 during which he underwent a R thoracentesis with removal of 1 L. The pleural fluid studies were consistent with exudative fluid , cytopath was negative for malignancy, cultures negative.  He now returns with complaints of fever of 101 F for 1-2 days along with a cough productive of clear and occasionally yellow sputum as well as worsening shortness of breath. He reports that the shortness of breath has been gradually worsening for the past 1.5 week. His exercise tolerance has been increasingly limited due to the SOB. He denies any chest pain, nausea, vomiting, diarrhea dysuria or hematuria but does report mild intermittent wheezing. An RVP was positive for entero rhino virus and a Ct chest showed increase in a small to moderate loculated R pleural effusion.     ER vitals:  T 98.4, P 88, /81, RR 26, 97% RA.   Pt was given a dose of vanco/levaquin in the ER.  Pt seen by thoracic surgery and is awaiting R VATS with pleural drainage and biopsy.        REVIEW OF SYSTEMS:    CONSTITUTIONAL: No fever, weight loss, or fatigue  EYES: No eye pain, visual disturbances, or discharge  ENMT:  No sore throat  NECK: No pain or stiffness  RESPIRATORY: (+) cough with clear phlegm.  BROOKS  CARDIOVASCULAR: No chest pain, palpitations, dizziness, or leg swelling  GASTROINTESTINAL: No abdominal or epigastric pain. No nausea, vomiting, or hematemesis; No diarrhea or constipation. No melena or hematochezia.  GENITOURINARY: No dysuria, frequency, hematuria, or incontinence  NEUROLOGICAL: No headaches, memory loss, loss of strength, numbness, or tremors  SKIN: No itching, burning, rashes, or lesions   LYMPH NODES: No enlarged glands  MUSCULOSKELETAL: No joint pain or swelling; No muscle, back, or extremity pain      PAST MEDICAL & SURGICAL HISTORY:  Kidney stones  Hyperlipidemia  Hypertension  CAD (coronary artery disease)  H/O mitral valve replacement      Allergies    penicillin ([place])    Intolerances        FAMILY HISTORY:  No pertinent family history in first degree relatives      SOCIAL HISTORY:        MEDICATIONS  (STANDING):  ALBUTerol/ipratropium for Nebulization 3 milliLiter(s) Nebulizer every 6 hours  aspirin  chewable 81 milliGRAM(s) Oral daily  atorvastatin 20 milliGRAM(s) Oral at bedtime  buDESOnide  80 MICROgram(s)/formoterol 4.5 MICROgram(s) Inhaler 2 Puff(s) Inhalation two times a day  enoxaparin Injectable 40 milliGRAM(s) SubCutaneous every 24 hours  finasteride 5 milliGRAM(s) Oral daily  furosemide    Tablet 20 milliGRAM(s) Oral daily  metoprolol tartrate 25 milliGRAM(s) Oral two times a day  tamsulosin 0.4 milliGRAM(s) Oral daily  valsartan 40 milliGRAM(s) Oral daily    MEDICATIONS  (PRN):  ondansetron Injectable 4 milliGRAM(s) IV Push every 6 hours PRN Nausea  oxyCODONE    5 mG/acetaminophen 325 mG 2 Tablet(s) Oral every 6 hours PRN Moderate Pain (4 - 6)      Vital Signs Last 24 Hrs  T(C): 37.3 (11 Apr 2018 05:32), Max: 37.3 (10 Apr 2018 18:49)  T(F): 99.1 (11 Apr 2018 05:32), Max: 99.1 (10 Apr 2018 18:49)  HR: 75 (11 Apr 2018 05:32) (73 - 90)  BP: 115/72 (11 Apr 2018 05:32) (115/72 - 135/86)  BP(mean): --  RR: 19 (11 Apr 2018 05:32) (19 - 26)  SpO2: 92% (11 Apr 2018 05:32) (92% - 97%)    PHYSICAL EXAM:    GENERAL: NAD, well-groomed  HEAD:  Atraumatic, Normocephalic  EYES: EOMI, PERRLA, conjunctiva and sclera clear  ENMT: No tonsillar erythema, exudates, or enlargement; Moist mucous membranes  NECK: Supple, No JVD  CHEST/LUNG: Clear to percussion bilaterally; No rales, rhonchi, mild expiratory wheezing b/l  HEART: Regular rate and rhythm; No murmurs, rubs, or gallops  ABDOMEN: Soft, Nontender, Nondistended; Bowel sounds present  EXTREMITIES:  2+ Peripheral Pulses, No clubbing, cyanosis, or edema  LYMPH: No lymphadenopathy noted  SKIN: No rashes or lesions    LABS:  CBC Full  -  ( 10 Apr 2018 10:36 )  WBC Count : 5.8 K/uL  Hemoglobin : 10.3 g/dL  Hematocrit : 31.0 %  Platelet Count - Automated : 238 K/uL  Mean Cell Volume : 88.0 fl  Mean Cell Hemoglobin : 29.2 pg  Mean Cell Hemoglobin Concentration : 33.2 gm/dL  Auto Neutrophil # : 3.6 K/uL  Auto Lymphocyte # : 1.0 K/uL  Auto Monocyte # : 0.8 K/uL  Auto Eosinophil # : 0.4 K/uL  Auto Basophil # : 0.0 K/uL  Auto Neutrophil % : 61.5 %  Auto Lymphocyte % : 16.9 %  Auto Monocyte % : 14.6 %  Auto Eosinophil % : 6.3 %  Auto Basophil % : 0.7 %      04-10    136  |  99  |  17  ----------------------------<  105<H>  4.4   |  27  |  0.93    Ca    9.2      10 Apr 2018 10:37    TPro  7.9  /  Alb  3.9  /  TBili  0.3  /  DBili  x   /  AST  20  /  ALT  20  /  AlkPhos  51  04-10      LIVER FUNCTIONS - ( 10 Apr 2018 10:37 )  Alb: 3.9 g/dL / Pro: 7.9 g/dL / ALK PHOS: 51 U/L / ALT: 20 U/L RC / AST: 20 U/L / GGT: x                 MICROBIOLOGY:      Culture - Body Fluid with Gram Stain (03.28.18 @ 20:47)    Gram Stain:   Rare polymorphonuclear leukocytes per low power field  No organisms seen per oil power field    Specimen Source: Paracentesis Thoracentesis fluid    Culture Results:   No growth    Culture - Acid Fast - Body Fluid w/Smear (03.28.18 @ 20:47)    Specimen Source: .Body Fluid Peritoneal Fluid    Acid Fast Bacilli Smear:   No acid fast bacilli seen by fluorochrome stain    Culture Results:   No growth at 1 week.    Rapid Respiratory Viral Panel (04.10.18 @ 10:37)    Rapid RVP Result: Detected: The [employer] RVP Rapid uses polymerase chain reaction (PCR) and melt  curve analysis to screen for adenovirus; coronavirus HKU1, NL63, 229E,  OC43; human metapneumovirus (hMPV); human enterovirus/rhinovirus  (Entero/RV); influenza A; influenza A/H1;influenza A/H3; influenza  A/H1-2009; influenza B; parainfluenza viruses 1, 2, 3, 4; respiratory  syncytial virus; Bordetella pertussis; Mycoplasma pneumoniae; and  Chlamydophila pneumoniae.    Entero/Rhinovirus (RapRVP): Detected          RADIOLOGY:    < from: CT Chest No Cont (04.10.18 @ 11:30) >  EXAM:  CT CHEST                            *** ADDENDUM 04/10/2018  ***    A technical error occurred during the initial interpretation of the   examination. The following report is correct:    #1. In the technique section, it should read that thiswas a noncontrast   examination    #2. There is interval increase in size of right pleural effusion seen   when compared with March 25, 2018. A portion of the effusion is now seen   loculated along the right lateral chest wall and within the right major   fissure.    There is no pleural thickening. However, definitive characterization of   empyema is not possible on this noncontrast exam.    Findings were discussed with Dr. Rogers at 320 pm on 4/10/18.         *** END OF ADDENDUM 04/10/2018  ***      PROCEDURE DATE:  04/10/2018            INTERPRETATION:  CLINICAL INFORMATION: Fever and cough. Concern for   empyema.    COMPARISON: CT chest angiogram from March 25, 2018.    PROCEDURE:   CT of the Chest was performed with intravenous contrast.  Sagittal and coronal reformats were performed as well as 3D (MIP)   reconstructions.      FINDINGS:    CHEST:     LUNGS AND LARGE AIRWAYS: Patent central airways. Centrilobular emphysema.   Right lower lobe segmental compressive atelectasis secondary to adjacent   pleural effusion. No pulmonary nodules.    PLEURA: Mild interval decrease in the small to moderate loculated   right-sided pleural effusion without enhancement .    VESSELS: Coronary artery bypass graft. Atherosclerotic calcifications of  the thoracic aorta and coronary arteries.    HEART: Heart size is normal. Mitral valve annuloplasty. Aortic annular   calcifications. No pericardial effusion. Coronary artery calcifications   noted    MEDIASTINUM AND ARVIN: No lymphadenopathy.    CHEST WALL AND LOWER NECK: Within normal limits.    VISUALIZED UPPER ABDOMEN: Renal cysts.    MUSCULOSKELETAL: Sternotomy. Mild degenerative changes of the visualized   spine.    IMPRESSION:     Small right pleural effusion without enhancement to suggest empyema.   Adjacent partial right lower lobe atelectasis.    Remaining incidental findings as above    < end of copied text >

## 2018-04-11 NOTE — PROGRESS NOTE ADULT - SUBJECTIVE AND OBJECTIVE BOX
CHIEF COMPLAINT:    Interval Events:    REVIEW OF SYSTEMS:  Constitutional: No fevers or chills. No weight loss. No fatigue or generalized malaise.  Eyes: No itching or discharge from the eyes  ENT: No ear pain. No ear discharge. No nasal congestion. No post nasal drip. No epistaxis. No throat pain. No sore throat. No difficulty swallowing.   CV: No chest pain. No palpitations. No lightheadedness or dizziness.   Resp: No dyspnea at rest. No dyspnea on exertion. No orthopnea. No wheezing. No cough. No stridor. No sputum production. No chest pain with respiration.  GI: No nausea. No vomiting. No diarrhea.  MSK: No joint pain or pain in any extremities  Integumentary: No skin lesions. No pedal edema.  Neurological: No gross motor weakness. No sensory changes.  [ ] All other systems negative  [ ] Unable to assess ROS because ________    OBJECTIVE:  ICU Vital Signs Last 24 Hrs  T(C): 36.9 (10 Apr 2018 21:50), Max: 37.4 (10 Apr 2018 09:45)  T(F): 98.5 (10 Apr 2018 21:50), Max: 99.3 (10 Apr 2018 09:45)  HR: 79 (10 Apr 2018 21:50) (71 - 90)  BP: 116/61 (10 Apr 2018 21:50) (111/81 - 135/86)  BP(mean): --  ABP: --  ABP(mean): --  RR: 20 (10 Apr 2018 21:50) (18 - 26)  SpO2: 94% (10 Apr 2018 21:50) (93% - 97%)        04-10 @ 07:01  -  04-11 @ 05:16  --------------------------------------------------------  IN: 240 mL / OUT: 0 mL / NET: 240 mL      CAPILLARY BLOOD GLUCOSE          PHYSICAL EXAM:  General: Awake, alert, oriented X 3.   HEENT: Atraumatic, normocephalic.                 Mallampatti Grade                 No nasal congestion.                No tonsillar or pharyngeal exudates.  Lymph Nodes: No palpable lymphadenopathy  Neck: No JVD. No carotid bruit.   Respiratory: Normal chest expansion                         Normal percussion                         Normal and equal air entry                         No wheeze, rhonchi or rales.  Cardiovascular: S1 S2 normal. No murmurs, rubs or gallops.   Abdomen: Soft, non-tender, non-distended. No organomegaly.  Extremities: Warm to touch. Peripheral pulse palpable. No pedal edema.   Skin: No rashes or skin lesions  Neurological: Motor and sensory examination equal and normal in all four extremities.  Psychiatry: Appropriate mood and affect.    HOSPITAL MEDICATIONS:  MEDICATIONS  (STANDING):  ALBUTerol/ipratropium for Nebulization 3 milliLiter(s) Nebulizer every 6 hours  aspirin  chewable 81 milliGRAM(s) Oral daily  atorvastatin 20 milliGRAM(s) Oral at bedtime  buDESOnide  80 MICROgram(s)/formoterol 4.5 MICROgram(s) Inhaler 2 Puff(s) Inhalation two times a day  enoxaparin Injectable 40 milliGRAM(s) SubCutaneous every 24 hours  finasteride 5 milliGRAM(s) Oral daily  furosemide    Tablet 20 milliGRAM(s) Oral daily  metoprolol tartrate 25 milliGRAM(s) Oral two times a day  tamsulosin 0.4 milliGRAM(s) Oral daily  valsartan 40 milliGRAM(s) Oral daily    MEDICATIONS  (PRN):  ondansetron Injectable 4 milliGRAM(s) IV Push every 6 hours PRN Nausea  oxyCODONE    5 mG/acetaminophen 325 mG 2 Tablet(s) Oral every 6 hours PRN Moderate Pain (4 - 6)      LABS:                        10.3   5.8   )-----------( 238      ( 10 Apr 2018 10:36 )             31.0     04-10    136  |  99  |  17  ----------------------------<  105<H>  4.4   |  27  |  0.93    Ca    9.2      10 Apr 2018 10:37    TPro  7.9  /  Alb  3.9  /  TBili  0.3  /  DBili  x   /  AST  20  /  ALT  20  /  AlkPhos  51  04-10    PT/INR - ( 10 Apr 2018 10:37 )   PT: 25.5 sec;   INR: 2.30 ratio         PTT - ( 10 Apr 2018 10:37 )  PTT:43.8 sec      Venous Blood Gas:  04-10 @ 10:41  7.38/48/29/28/48  VBG Lactate: 0.9      MICROBIOLOGY:     RADIOLOGY:  [ ] Reviewed and interpreted by me    Point of Care Ultrasound Findings:    PFT:    EKG: CHIEF COMPLAINT: sob and coughing; difficulty lying in supine position    Interval Events: CTS evaluation    REVIEW OF SYSTEMS:  Constitutional: No fevers or chills. No weight loss. + fatigue or generalized malaise.  Eyes: No itching or discharge from the eyes  ENT: No ear pain. No ear discharge. No nasal congestion. No post nasal drip. No epistaxis. No throat pain. No sore throat. No difficulty swallowing.   CV: No chest pain. No palpitations. No lightheadedness or dizziness.   Resp: No dyspnea at rest. + dyspnea on exertion. No orthopnea. No wheezing. + cough. No stridor. No sputum production. No chest pain with respiration.  GI: No nausea. No vomiting. No diarrhea.  MSK: No joint pain or pain in any extremities  Integumentary: No skin lesions. No pedal edema.  Neurological: No gross motor weakness. No sensory changes.  [ +] All other systems negative  [ ] Unable to assess ROS because ________    OBJECTIVE:  ICU Vital Signs Last 24 Hrs  T(C): 36.9 (10 Apr 2018 21:50), Max: 37.4 (10 Apr 2018 09:45)  T(F): 98.5 (10 Apr 2018 21:50), Max: 99.3 (10 Apr 2018 09:45)  HR: 79 (10 Apr 2018 21:50) (71 - 90)  BP: 116/61 (10 Apr 2018 21:50) (111/81 - 135/86)  BP(mean): --  ABP: --  ABP(mean): --  RR: 20 (10 Apr 2018 21:50) (18 - 26)  SpO2: 94% (10 Apr 2018 21:50) (93% - 97%)        04-10 @ 07:01  -  04-11 @ 05:16  --------------------------------------------------------  IN: 240 mL / OUT: 0 mL / NET: 240 mL      CAPILLARY BLOOD GLUCOSE          PHYSICAL EXAM: NAD in chair  General: Awake, alert, oriented X 3.   HEENT: Atraumatic, normocephalic.                 Mallampatti Grade 3                No nasal congestion.                No tonsillar or pharyngeal exudates.  Lymph Nodes: No palpable lymphadenopathy  Neck: No JVD. No carotid bruit.   Respiratory: abnormal chest expansion                         abnormal percussion-left base                         abnormal and unequal air entry                         No wheeze, rhonchi or rales.  Cardiovascular: S1 S2 normal. No murmurs, rubs or gallops.   Abdomen: Soft, non-tender, non-distended. No organomegaly.  Extremities: Warm to touch. Peripheral pulse palpable. No pedal edema.   Skin: No rashes or skin lesions  Neurological: Motor and sensory examination equal and normal in all four extremities.  Psychiatry: Appropriate mood and affect.    HOSPITAL MEDICATIONS:  MEDICATIONS  (STANDING):  ALBUTerol/ipratropium for Nebulization 3 milliLiter(s) Nebulizer every 6 hours  aspirin  chewable 81 milliGRAM(s) Oral daily  atorvastatin 20 milliGRAM(s) Oral at bedtime  buDESOnide  80 MICROgram(s)/formoterol 4.5 MICROgram(s) Inhaler 2 Puff(s) Inhalation two times a day  enoxaparin Injectable 40 milliGRAM(s) SubCutaneous every 24 hours  finasteride 5 milliGRAM(s) Oral daily  furosemide    Tablet 20 milliGRAM(s) Oral daily  metoprolol tartrate 25 milliGRAM(s) Oral two times a day  tamsulosin 0.4 milliGRAM(s) Oral daily  valsartan 40 milliGRAM(s) Oral daily    MEDICATIONS  (PRN):  ondansetron Injectable 4 milliGRAM(s) IV Push every 6 hours PRN Nausea  oxyCODONE    5 mG/acetaminophen 325 mG 2 Tablet(s) Oral every 6 hours PRN Moderate Pain (4 - 6)      LABS:                        10.3   5.8   )-----------( 238      ( 10 Apr 2018 10:36 )             31.0     04-10    136  |  99  |  17  ----------------------------<  105<H>  4.4   |  27  |  0.93    Ca    9.2      10 Apr 2018 10:37    TPro  7.9  /  Alb  3.9  /  TBili  0.3  /  DBili  x   /  AST  20  /  ALT  20  /  AlkPhos  51  04-10    PT/INR - ( 10 Apr 2018 10:37 )   PT: 25.5 sec;   INR: 2.30 ratio         PTT - ( 10 Apr 2018 10:37 )  PTT:43.8 sec      Venous Blood Gas:  04-10 @ 10:41  7.38/48/29/28/48  VBG Lactate: 0.9      MICROBIOLOGY:     RADIOLOGY: < from: CT Chest No Cont (04.10.18 @ 11:30) >  2. There is interval increase in size of right pleural effusion seen   when compared with March 25, 2018. A portion of the effusion is now seen   loculated along the right lateral chest wall and within the right major   fissure.    There is no pleural thickening. However, definitive characterization of   empyema is not possible on this noncontrast exam.    Findings were discussed with Dr. Rogers at 320 pm on 4/10/18.     < end of copied text >    [ ] Reviewed and interpreted by me    Point of Care Ultrasound Findings:    PFT:    EKG:

## 2018-04-11 NOTE — DISCHARGE NOTE ADULT - PLAN OF CARE
Symptoms improved s/p VATS, chest tubes removed, Post cxr cleared Please follow up with  within 7-10days Please discontinue Coumadin per  and follow with md in 7 days or less   Atrial fibrillation is the most common heart rhythm problem.  The condition puts you at risk for has stroke and heart attack  It helps if you control your blood pressure, not drink more than 1-2 alcohol drinks per day, cut down on caffeine, getting treatment for over active thyroid gland, and get regular exercise  Call your doctor if you feel your heart racing or beating unusually, chest tightness or pain, lightheaded, faint, shortness of breath especially with exercise  It is important to take your heart medication as prescribed  You may be on anticoagulation which is very important to take as directed - you may need blood work to monitor drug levels Continue all meds as ordered on this discharge paperwork

## 2018-04-11 NOTE — PROGRESS NOTE ADULT - PROBLEM SELECTOR PLAN 5
GI prophylaxis:  PPI pre breakfast  aspiration precautions-all meals are to OOB as able multiple meds--on AC--consider vit K to correct INR

## 2018-04-12 ENCOUNTER — APPOINTMENT (OUTPATIENT)
Dept: PULMONOLOGY | Facility: CLINIC | Age: 68
End: 2018-04-12

## 2018-04-12 ENCOUNTER — TRANSCRIPTION ENCOUNTER (OUTPATIENT)
Age: 68
End: 2018-04-12

## 2018-04-12 LAB
APTT BLD: 34.8 SEC — SIGNIFICANT CHANGE UP (ref 27.5–37.4)
HCT VFR BLD CALC: 29.1 % — LOW (ref 39–50)
HGB BLD-MCNC: 9.3 G/DL — LOW (ref 13–17)
INR BLD: 1.63 RATIO — HIGH (ref 0.88–1.16)
MCHC RBC-ENTMCNC: 28.5 PG — SIGNIFICANT CHANGE UP (ref 27–34)
MCHC RBC-ENTMCNC: 32 GM/DL — SIGNIFICANT CHANGE UP (ref 32–36)
MCV RBC AUTO: 89.3 FL — SIGNIFICANT CHANGE UP (ref 80–100)
PLATELET # BLD AUTO: 252 K/UL — SIGNIFICANT CHANGE UP (ref 150–400)
PROTHROM AB SERPL-ACNC: 17.8 SEC — HIGH (ref 9.8–12.7)
RBC # BLD: 3.26 M/UL — LOW (ref 4.2–5.8)
RBC # FLD: 13.7 % — SIGNIFICANT CHANGE UP (ref 10.3–14.5)
RH IG SCN BLD-IMP: POSITIVE — SIGNIFICANT CHANGE UP
WBC # BLD: 5.57 K/UL — SIGNIFICANT CHANGE UP (ref 3.8–10.5)
WBC # FLD AUTO: 5.57 K/UL — SIGNIFICANT CHANGE UP (ref 3.8–10.5)

## 2018-04-12 PROCEDURE — 99232 SBSQ HOSP IP/OBS MODERATE 35: CPT

## 2018-04-12 RX ORDER — METOPROLOL TARTRATE 50 MG
12.5 TABLET ORAL
Qty: 0 | Refills: 0 | Status: DISCONTINUED | OUTPATIENT
Start: 2018-04-12 | End: 2018-04-13

## 2018-04-12 RX ADMIN — TAMSULOSIN HYDROCHLORIDE 0.4 MILLIGRAM(S): 0.4 CAPSULE ORAL at 11:53

## 2018-04-12 RX ADMIN — FINASTERIDE 5 MILLIGRAM(S): 5 TABLET, FILM COATED ORAL at 11:53

## 2018-04-12 RX ADMIN — Medication 3 MILLILITER(S): at 18:28

## 2018-04-12 RX ADMIN — BUDESONIDE AND FORMOTEROL FUMARATE DIHYDRATE 2 PUFF(S): 160; 4.5 AEROSOL RESPIRATORY (INHALATION) at 05:40

## 2018-04-12 RX ADMIN — BUDESONIDE AND FORMOTEROL FUMARATE DIHYDRATE 2 PUFF(S): 160; 4.5 AEROSOL RESPIRATORY (INHALATION) at 18:27

## 2018-04-12 RX ADMIN — VALSARTAN 40 MILLIGRAM(S): 80 TABLET ORAL at 05:39

## 2018-04-12 RX ADMIN — Medication 20 MILLIGRAM(S): at 05:39

## 2018-04-12 RX ADMIN — Medication 81 MILLIGRAM(S): at 11:53

## 2018-04-12 RX ADMIN — Medication 3 MILLILITER(S): at 05:39

## 2018-04-12 RX ADMIN — Medication 25 MILLIGRAM(S): at 05:39

## 2018-04-12 RX ADMIN — ATORVASTATIN CALCIUM 20 MILLIGRAM(S): 80 TABLET, FILM COATED ORAL at 22:16

## 2018-04-12 RX ADMIN — Medication 12.5 MILLIGRAM(S): at 18:57

## 2018-04-12 RX ADMIN — Medication 3 MILLILITER(S): at 11:53

## 2018-04-12 NOTE — PROGRESS NOTE ADULT - SUBJECTIVE AND OBJECTIVE BOX
CHIEF COMPLAINT:    Interval Events:    REVIEW OF SYSTEMS:  Constitutional: No fevers or chills. No weight loss. No fatigue or generalized malaise.  Eyes: No itching or discharge from the eyes  ENT: No ear pain. No ear discharge. No nasal congestion. No post nasal drip. No epistaxis. No throat pain. No sore throat. No difficulty swallowing.   CV: No chest pain. No palpitations. No lightheadedness or dizziness.   Resp: No dyspnea at rest. No dyspnea on exertion. No orthopnea. No wheezing. No cough. No stridor. No sputum production. No chest pain with respiration.  GI: No nausea. No vomiting. No diarrhea.  MSK: No joint pain or pain in any extremities  Integumentary: No skin lesions. No pedal edema.  Neurological: No gross motor weakness. No sensory changes.  [ ] All other systems negative  [ ] Unable to assess ROS because ________    OBJECTIVE:  ICU Vital Signs Last 24 Hrs  T(C): 36.9 (11 Apr 2018 19:46), Max: 37.3 (11 Apr 2018 05:32)  T(F): 98.4 (11 Apr 2018 19:46), Max: 99.1 (11 Apr 2018 05:32)  HR: 97 (11 Apr 2018 19:46) (71 - 97)  BP: 121/69 (11 Apr 2018 19:46) (101/61 - 121/69)  BP(mean): --  ABP: --  ABP(mean): --  RR: 18 (11 Apr 2018 19:46) (18 - 19)  SpO2: 93% (11 Apr 2018 19:46) (92% - 95%)        04-10 @ 07:01 - 04-11 @ 07:00  --------------------------------------------------------  IN: 240 mL / OUT: 0 mL / NET: 240 mL    04-11 @ 07:01  - 04-12 @ 04:56  --------------------------------------------------------  IN: 480 mL / OUT: 0 mL / NET: 480 mL      CAPILLARY BLOOD GLUCOSE          PHYSICAL EXAM:  General: Awake, alert, oriented X 3.   HEENT: Atraumatic, normocephalic.                 Mallampatti Grade                 No nasal congestion.                No tonsillar or pharyngeal exudates.  Lymph Nodes: No palpable lymphadenopathy  Neck: No JVD. No carotid bruit.   Respiratory: Normal chest expansion                         Normal percussion                         Normal and equal air entry                         No wheeze, rhonchi or rales.  Cardiovascular: S1 S2 normal. No murmurs, rubs or gallops.   Abdomen: Soft, non-tender, non-distended. No organomegaly.  Extremities: Warm to touch. Peripheral pulse palpable. No pedal edema.   Skin: No rashes or skin lesions  Neurological: Motor and sensory examination equal and normal in all four extremities.  Psychiatry: Appropriate mood and affect.    HOSPITAL MEDICATIONS:  MEDICATIONS  (STANDING):  ALBUTerol/ipratropium for Nebulization 3 milliLiter(s) Nebulizer every 6 hours  aspirin  chewable 81 milliGRAM(s) Oral daily  atorvastatin 20 milliGRAM(s) Oral at bedtime  buDESOnide  80 MICROgram(s)/formoterol 4.5 MICROgram(s) Inhaler 2 Puff(s) Inhalation two times a day  enoxaparin Injectable 40 milliGRAM(s) SubCutaneous every 24 hours  finasteride 5 milliGRAM(s) Oral daily  furosemide    Tablet 20 milliGRAM(s) Oral daily  metoprolol tartrate 25 milliGRAM(s) Oral two times a day  tamsulosin 0.4 milliGRAM(s) Oral daily  valsartan 40 milliGRAM(s) Oral daily    MEDICATIONS  (PRN):  ondansetron Injectable 4 milliGRAM(s) IV Push every 6 hours PRN Nausea  oxyCODONE    5 mG/acetaminophen 325 mG 2 Tablet(s) Oral every 6 hours PRN Moderate Pain (4 - 6)      LABS:                        10.3   5.8   )-----------( 238      ( 10 Apr 2018 10:36 )             31.0     04-10    136  |  99  |  17  ----------------------------<  105<H>  4.4   |  27  |  0.93    Ca    9.2      10 Apr 2018 10:37    TPro  7.9  /  Alb  3.9  /  TBili  0.3  /  DBili  x   /  AST  20  /  ALT  20  /  AlkPhos  51  04-10    PT/INR - ( 11 Apr 2018 06:37 )   PT: 21.8 sec;   INR: 1.97 ratio         PTT - ( 11 Apr 2018 06:37 )  PTT:40.0 sec      Venous Blood Gas:  04-10 @ 10:41  7.38/48/29/28/48  VBG Lactate: 0.9      MICROBIOLOGY:     RADIOLOGY:  [ ] Reviewed and interpreted by me    Point of Care Ultrasound Findings:    PFT:    EKG: CHIEF COMPLAINT: cough remains--no pain but mild BROOKS    Interval Events: CTS-surgery 4/13    REVIEW OF SYSTEMS:  Constitutional: No fevers or chills. No weight loss. No fatigue or generalized malaise.  Eyes: No itching or discharge from the eyes  ENT: No ear pain. No ear discharge. No nasal congestion. No post nasal drip. No epistaxis. No throat pain. No sore throat. No difficulty swallowing.   CV: No chest pain. No palpitations. No lightheadedness or dizziness.   Resp: No dyspnea at rest. + dyspnea on exertion. No orthopnea. No wheezing. + cough. No stridor. No sputum production. No chest pain with respiration.  GI: No nausea. No vomiting. No diarrhea.  MSK: No joint pain or pain in any extremities  Integumentary: No skin lesions. No pedal edema.  Neurological: No gross motor weakness. No sensory changes.  [+] All other systems negative  [ ] Unable to assess ROS because ________    OBJECTIVE:  ICU Vital Signs Last 24 Hrs  T(C): 36.9 (11 Apr 2018 19:46), Max: 37.3 (11 Apr 2018 05:32)  T(F): 98.4 (11 Apr 2018 19:46), Max: 99.1 (11 Apr 2018 05:32)  HR: 97 (11 Apr 2018 19:46) (71 - 97)  BP: 121/69 (11 Apr 2018 19:46) (101/61 - 121/69)  BP(mean): --  ABP: --  ABP(mean): --  RR: 18 (11 Apr 2018 19:46) (18 - 19)  SpO2: 93% (11 Apr 2018 19:46) (92% - 95%)        04-10 @ 07:01 - 04-11 @ 07:00  --------------------------------------------------------  IN: 240 mL / OUT: 0 mL / NET: 240 mL    04-11 @ 07:01  - 04-12 @ 04:56  --------------------------------------------------------  IN: 480 mL / OUT: 0 mL / NET: 480 mL      CAPILLARY BLOOD GLUCOSE          PHYSICAL EXAM: NAD in chair  General: Awake, alert, oriented X 3.   HEENT: Atraumatic, normocephalic.                 Mallampatti Grade 3                No nasal congestion.                No tonsillar or pharyngeal exudates.  Lymph Nodes: No palpable lymphadenopathy  Neck: No JVD. No carotid bruit.   Respiratory: abnormal chest expansion                         abnormal percussion--right base-1/2 up                         abnormal and unequal air entry                         No wheeze, rhonchi or rales.  Cardiovascular: S1 S2 normal. No murmurs, rubs or gallops.   Abdomen: Soft, non-tender, non-distended. No organomegaly.  Extremities: Warm to touch. Peripheral pulse palpable. No pedal edema.   Skin: No rashes or skin lesions  Neurological: Motor and sensory examination equal and normal in all four extremities.  Psychiatry: Appropriate mood and affect.    HOSPITAL MEDICATIONS:  MEDICATIONS  (STANDING):  ALBUTerol/ipratropium for Nebulization 3 milliLiter(s) Nebulizer every 6 hours  aspirin  chewable 81 milliGRAM(s) Oral daily  atorvastatin 20 milliGRAM(s) Oral at bedtime  buDESOnide  80 MICROgram(s)/formoterol 4.5 MICROgram(s) Inhaler 2 Puff(s) Inhalation two times a day  enoxaparin Injectable 40 milliGRAM(s) SubCutaneous every 24 hours  finasteride 5 milliGRAM(s) Oral daily  furosemide    Tablet 20 milliGRAM(s) Oral daily  metoprolol tartrate 25 milliGRAM(s) Oral two times a day  tamsulosin 0.4 milliGRAM(s) Oral daily  valsartan 40 milliGRAM(s) Oral daily    MEDICATIONS  (PRN):  ondansetron Injectable 4 milliGRAM(s) IV Push every 6 hours PRN Nausea  oxyCODONE    5 mG/acetaminophen 325 mG 2 Tablet(s) Oral every 6 hours PRN Moderate Pain (4 - 6)      LABS:                        10.3   5.8   )-----------( 238      ( 10 Apr 2018 10:36 )             31.0     04-10    136  |  99  |  17  ----------------------------<  105<H>  4.4   |  27  |  0.93    Ca    9.2      10 Apr 2018 10:37    TPro  7.9  /  Alb  3.9  /  TBili  0.3  /  DBili  x   /  AST  20  /  ALT  20  /  AlkPhos  51  04-10    PT/INR - ( 11 Apr 2018 06:37 )   PT: 21.8 sec;   INR: 1.97 ratio         PTT - ( 11 Apr 2018 06:37 )  PTT:40.0 sec      Venous Blood Gas:  04-10 @ 10:41  7.38/48/29/28/48  VBG Lactate: 0.9      MICROBIOLOGY:     RADIOLOGY:  [ ] Reviewed and interpreted by me    Point of Care Ultrasound Findings:    PFT:    EKG:

## 2018-04-12 NOTE — PROGRESS NOTE ADULT - SUBJECTIVE AND OBJECTIVE BOX
Subjective: Pt states "When is my procedure?" denies any CP, SOB, N/V and palpitations. No acute events overnight.     Vital Signs Last 24 Hrs  T(C): 37.1 (04-12-18 @ 05:38), Max: 37.1 (04-12-18 @ 05:38)  T(F): 98.8 (04-12-18 @ 05:38), Max: 98.8 (04-12-18 @ 05:38)  HR: 92 (04-12-18 @ 05:38) (71 - 97)  BP: 124/74 (04-12-18 @ 05:38) (101/61 - 124/74)  RR: 18 (04-12-18 @ 05:38) (18 - 18)  SpO2: 93% (04-12-18 @ 05:38) (93% - 95%)         04-12-18 @ 07:01  -  04-12-18 @ 12:18  --------------------------------------------------------  IN: 100 mL / OUT: 0 mL / NET: 100 mL    Relevant labs, radiology and Medications reviewed                        9.3    5.57  )-----------( 252      ( 12 Apr 2018 07:47 )             29.1     PHYSICAL EXAM  Neurology: A&Ox3, NAD, no gross deficits  CV : RRR+S1S2  Lungs: Respirations non-labored, B/L BS diminished at bases  Abdomen: Soft, NT/ND, +BSx4Q, +BM  : Voiding without difficulty  Extremities: B/L LE no edema, negative calf tenderness, +PP             MEDICATIONS  ALBUTerol/ipratropium for Nebulization 3 milliLiter(s) Nebulizer every 6 hours  aspirin  chewable 81 milliGRAM(s) Oral daily  atorvastatin 20 milliGRAM(s) Oral at bedtime  buDESOnide  80 MICROgram(s)/formoterol 4.5 MICROgram(s) Inhaler 2 Puff(s) Inhalation two times a day  enoxaparin Injectable 40 milliGRAM(s) SubCutaneous every 24 hours  finasteride 5 milliGRAM(s) Oral daily  furosemide    Tablet 20 milliGRAM(s) Oral daily  metoprolol tartrate 25 milliGRAM(s) Oral two times a day  ondansetron Injectable 4 milliGRAM(s) IV Push every 6 hours PRN  oxyCODONE    5 mG/acetaminophen 325 mG 2 Tablet(s) Oral every 6 hours PRN  tamsulosin 0.4 milliGRAM(s) Oral daily  valsartan 40 milliGRAM(s) Oral daily      Discussed with thoracic surgery attending, Dr. Rogers.

## 2018-04-12 NOTE — PROGRESS NOTE ADULT - ASSESSMENT
67 M with a PMH of HTN, HLD, CAD, diastolic dysfunction,  s/p MV annuloplasty, A fib on coumadin, s/p recent hospitalization for SOB with draiange of R pleural effusion, now returning with enterorhino virus infection and SOB with a persistent loculated R pleural effusion mildly increased in size.     Recurring R pleural Effusion:  - Pt seen by thoracic surgery, plan for VATs with biopsy on Thursday  - Currently saturating well on RA  - Would suggest Duonebs Q6H  - Can start Symbicort BID  - Chest PT, incentive spirometry/ acapella Q6H  - Would hold AC in anticipation of OR. May require a heparin gtt once INR <2--consider vit. K

## 2018-04-12 NOTE — PROGRESS NOTE ADULT - ASSESSMENT
67M with PMHx of Afib on Coumadin, HTN, CAD s/p CABG, MVR, admitted for progressive BROOKS s/p Rt thoracentesis 2 weeks ago. Pt s/p Chest CT today with reoccurrence of small right pleural effusion without enhancement to suggest empyema and adjacent partial right lower lobe atelectasis. Thoracic Surgery consulted for Right VATS pleural biopsy and drainage of pleural effusion.

## 2018-04-12 NOTE — PROGRESS NOTE ADULT - PROBLEM SELECTOR PLAN 1
OR with Dr. Rogers for Rt Vats  pleural bx & drainage of rt pl effusion currently scheduled for Friday 4/13.  Pt needs 2nd Type & Screen.  NPO p MN

## 2018-04-12 NOTE — PROGRESS NOTE ADULT - ATTENDING COMMENTS
as above-  s/p prior non-dx  thoro -- re-admit w/recurrent effusion for VATS  (DDX-asbestos related, lung CA, empyema, lymphoma)  VATS-4/12--consider correcting w/ vit K (INR)  Bronchodilator rx as above    Chirag Flynn MD-Pulmonary   [phone] as above-  s/p prior non-dx  thoro -- re-admit w/recurrent effusion for VATS  (DDX-asbestos related, lung CA, empyema, lymphoma)  VATS-4/13--consider correcting w/ vit K (INR)  Bronchodilator rx as above    Chirag Flynn MD-Pulmonary   [phone]

## 2018-04-12 NOTE — PROGRESS NOTE ADULT - ASSESSMENT
68 yo male presenting with 2 day hx of acutely worsening cough and fever.  fever high of 101.3 this morning.  productive cough which has been going on for a few days. yellowish sputum.  did not take anything for his symptoms     Problem/Plan - 1:  ·  Problem: Acute febrile illness.  Plan: fredrick viral  ID fu appreciated  will monitor.     Problem/Plan - 2:  ·  Problem: Pleural effusion.  Plan: pulmonary fu  cts fu for tap pending inr to come down, scheduled for tomorrow

## 2018-04-12 NOTE — PROGRESS NOTE ADULT - SUBJECTIVE AND OBJECTIVE BOX
Patient is a 67y old  Male who presents with a chief complaint of fever and cough (11 Apr 2018 10:02)      INTERVAL HPI/OVERNIGHT EVENTS:  T(C): 36.8 (04-12-18 @ 14:56), Max: 37.1 (04-12-18 @ 05:38)  HR: 83 (04-12-18 @ 14:56) (83 - 97)  BP: 114/72 (04-12-18 @ 14:56) (114/72 - 124/74)  RR: 18 (04-12-18 @ 14:56) (18 - 18)  SpO2: 92% (04-12-18 @ 14:56) (92% - 94%)  Wt(kg): --  I&O's Summary    11 Apr 2018 07:01  -  12 Apr 2018 07:00  --------------------------------------------------------  IN: 480 mL / OUT: 0 mL / NET: 480 mL    12 Apr 2018 07:01  -  12 Apr 2018 16:16  --------------------------------------------------------  IN: 100 mL / OUT: 0 mL / NET: 100 mL        LABS:                        9.3    5.57  )-----------( 252      ( 12 Apr 2018 07:47 )             29.1           PT/INR - ( 12 Apr 2018 06:53 )   PT: 17.8 sec;   INR: 1.63 ratio         PTT - ( 12 Apr 2018 06:53 )  PTT:34.8 sec    CAPILLARY BLOOD GLUCOSE                MEDICATIONS  (STANDING):  ALBUTerol/ipratropium for Nebulization 3 milliLiter(s) Nebulizer every 6 hours  aspirin  chewable 81 milliGRAM(s) Oral daily  atorvastatin 20 milliGRAM(s) Oral at bedtime  buDESOnide  80 MICROgram(s)/formoterol 4.5 MICROgram(s) Inhaler 2 Puff(s) Inhalation two times a day  enoxaparin Injectable 40 milliGRAM(s) SubCutaneous every 24 hours  finasteride 5 milliGRAM(s) Oral daily  furosemide    Tablet 20 milliGRAM(s) Oral daily  metoprolol tartrate 25 milliGRAM(s) Oral two times a day  tamsulosin 0.4 milliGRAM(s) Oral daily  valsartan 40 milliGRAM(s) Oral daily    MEDICATIONS  (PRN):  ondansetron Injectable 4 milliGRAM(s) IV Push every 6 hours PRN Nausea  oxyCODONE    5 mG/acetaminophen 325 mG 2 Tablet(s) Oral every 6 hours PRN Moderate Pain (4 - 6)          PHYSICAL EXAM:  GENERAL: NAD, well-groomed, well-developed  HEAD:  Atraumatic, Normocephalic  CHEST/LUNG: Clear to percussion bilaterally; No rales, rhonchi, wheezing, or rubs  HEART: Regular rate and rhythm; No murmurs, rubs, or gallops  ABDOMEN: Soft, Nontender, Nondistended; Bowel sounds present  EXTREMITIES:  2+ Peripheral Pulses, No clubbing, cyanosis, or edema  LYMPH: No lymphadenopathy noted  SKIN: No rashes or lesions    Care Discussed with Consultants/Other Providers [+ ] YES  [ ] NO

## 2018-04-12 NOTE — PROGRESS NOTE ADULT - PROBLEM SELECTOR PLAN 1
s/p thorocentesis by--radiology   prior pleural -cyto-negative prior  CTS Sue to take to OR for VATS bx s/p thorocentesis by--radiology   prior pleural -cyto-negative prior  CTS Sue to take to OR for VATS bx 4/13

## 2018-04-13 ENCOUNTER — APPOINTMENT (OUTPATIENT)
Dept: THORACIC SURGERY | Facility: HOSPITAL | Age: 68
End: 2018-04-13
Payer: MEDICARE

## 2018-04-13 ENCOUNTER — RESULT REVIEW (OUTPATIENT)
Age: 68
End: 2018-04-13

## 2018-04-13 DIAGNOSIS — I48.91 UNSPECIFIED ATRIAL FIBRILLATION: ICD-10-CM

## 2018-04-13 LAB
ANION GAP SERPL CALC-SCNC: 12 MMOL/L — SIGNIFICANT CHANGE UP (ref 5–17)
ANION GAP SERPL CALC-SCNC: 13 MMOL/L — SIGNIFICANT CHANGE UP (ref 5–17)
APTT BLD: 33.3 SEC — SIGNIFICANT CHANGE UP (ref 27.5–37.4)
BUN SERPL-MCNC: 13 MG/DL — SIGNIFICANT CHANGE UP (ref 7–23)
BUN SERPL-MCNC: 14 MG/DL — SIGNIFICANT CHANGE UP (ref 7–23)
CALCIUM SERPL-MCNC: 9.1 MG/DL — SIGNIFICANT CHANGE UP (ref 8.4–10.5)
CALCIUM SERPL-MCNC: 9.2 MG/DL — SIGNIFICANT CHANGE UP (ref 8.4–10.5)
CHLORIDE SERPL-SCNC: 101 MMOL/L — SIGNIFICANT CHANGE UP (ref 96–108)
CHLORIDE SERPL-SCNC: 104 MMOL/L — SIGNIFICANT CHANGE UP (ref 96–108)
CO2 SERPL-SCNC: 25 MMOL/L — SIGNIFICANT CHANGE UP (ref 22–31)
CO2 SERPL-SCNC: 26 MMOL/L — SIGNIFICANT CHANGE UP (ref 22–31)
CREAT SERPL-MCNC: 0.81 MG/DL — SIGNIFICANT CHANGE UP (ref 0.5–1.3)
CREAT SERPL-MCNC: 0.87 MG/DL — SIGNIFICANT CHANGE UP (ref 0.5–1.3)
GLUCOSE SERPL-MCNC: 141 MG/DL — HIGH (ref 70–99)
GLUCOSE SERPL-MCNC: 96 MG/DL — SIGNIFICANT CHANGE UP (ref 70–99)
HCT VFR BLD CALC: 28 % — LOW (ref 39–50)
HCT VFR BLD CALC: 28.2 % — LOW (ref 39–50)
HGB BLD-MCNC: 9 G/DL — LOW (ref 13–17)
HGB BLD-MCNC: 9.5 G/DL — LOW (ref 13–17)
INR BLD: 1.42 RATIO — HIGH (ref 0.88–1.16)
MAGNESIUM SERPL-MCNC: 1.8 MG/DL — SIGNIFICANT CHANGE UP (ref 1.6–2.6)
MCHC RBC-ENTMCNC: 28.6 PG — SIGNIFICANT CHANGE UP (ref 27–34)
MCHC RBC-ENTMCNC: 30 PG — SIGNIFICANT CHANGE UP (ref 27–34)
MCHC RBC-ENTMCNC: 31.9 GM/DL — LOW (ref 32–36)
MCHC RBC-ENTMCNC: 33.9 GM/DL — SIGNIFICANT CHANGE UP (ref 32–36)
MCV RBC AUTO: 88.4 FL — SIGNIFICANT CHANGE UP (ref 80–100)
MCV RBC AUTO: 89.5 FL — SIGNIFICANT CHANGE UP (ref 80–100)
PHOSPHATE SERPL-MCNC: 3.8 MG/DL — SIGNIFICANT CHANGE UP (ref 2.5–4.5)
PLATELET # BLD AUTO: 258 K/UL — SIGNIFICANT CHANGE UP (ref 150–400)
PLATELET # BLD AUTO: 268 K/UL — SIGNIFICANT CHANGE UP (ref 150–400)
POTASSIUM SERPL-MCNC: 4.3 MMOL/L — SIGNIFICANT CHANGE UP (ref 3.5–5.3)
POTASSIUM SERPL-MCNC: 4.4 MMOL/L — SIGNIFICANT CHANGE UP (ref 3.5–5.3)
POTASSIUM SERPL-SCNC: 4.3 MMOL/L — SIGNIFICANT CHANGE UP (ref 3.5–5.3)
POTASSIUM SERPL-SCNC: 4.4 MMOL/L — SIGNIFICANT CHANGE UP (ref 3.5–5.3)
PROTHROM AB SERPL-ACNC: 16.2 SEC — HIGH (ref 10–13.1)
RBC # BLD: 3.15 M/UL — LOW (ref 4.2–5.8)
RBC # BLD: 3.17 M/UL — LOW (ref 4.2–5.8)
RBC # FLD: 12.2 % — SIGNIFICANT CHANGE UP (ref 10.3–14.5)
RBC # FLD: 13.8 % — SIGNIFICANT CHANGE UP (ref 10.3–14.5)
SODIUM SERPL-SCNC: 139 MMOL/L — SIGNIFICANT CHANGE UP (ref 135–145)
SODIUM SERPL-SCNC: 142 MMOL/L — SIGNIFICANT CHANGE UP (ref 135–145)
WBC # BLD: 10.4 K/UL — SIGNIFICANT CHANGE UP (ref 3.8–10.5)
WBC # BLD: 5.16 K/UL — SIGNIFICANT CHANGE UP (ref 3.8–10.5)
WBC # FLD AUTO: 10.4 K/UL — SIGNIFICANT CHANGE UP (ref 3.8–10.5)
WBC # FLD AUTO: 5.16 K/UL — SIGNIFICANT CHANGE UP (ref 3.8–10.5)

## 2018-04-13 PROCEDURE — 32652 THORACOSCOPY REM TOTL CORTEX: CPT

## 2018-04-13 PROCEDURE — 88305 TISSUE EXAM BY PATHOLOGIST: CPT | Mod: 26

## 2018-04-13 PROCEDURE — 88331 PATH CONSLTJ SURG 1 BLK 1SPC: CPT | Mod: 26

## 2018-04-13 PROCEDURE — 32225 PARTIAL RELEASE OF LUNG: CPT | Mod: 59

## 2018-04-13 PROCEDURE — 71045 X-RAY EXAM CHEST 1 VIEW: CPT | Mod: 26

## 2018-04-13 PROCEDURE — 32653 THORACOSCOPY REMOV FB/FIBRIN: CPT

## 2018-04-13 PROCEDURE — 32150 REMOVAL OF LUNG LESION(S): CPT

## 2018-04-13 PROCEDURE — 32098 OPEN BIOPSY OF LUNG PLEURA: CPT | Mod: 59

## 2018-04-13 PROCEDURE — 99232 SBSQ HOSP IP/OBS MODERATE 35: CPT

## 2018-04-13 RX ORDER — ACETAMINOPHEN 500 MG
1000 TABLET ORAL ONCE
Qty: 0 | Refills: 0 | Status: COMPLETED | OUTPATIENT
Start: 2018-04-13 | End: 2018-04-14

## 2018-04-13 RX ORDER — BUDESONIDE AND FORMOTEROL FUMARATE DIHYDRATE 160; 4.5 UG/1; UG/1
2 AEROSOL RESPIRATORY (INHALATION)
Qty: 0 | Refills: 0 | Status: DISCONTINUED | OUTPATIENT
Start: 2018-04-13 | End: 2018-04-19

## 2018-04-13 RX ORDER — HYDROMORPHONE HYDROCHLORIDE 2 MG/ML
0.5 INJECTION INTRAMUSCULAR; INTRAVENOUS; SUBCUTANEOUS ONCE
Qty: 0 | Refills: 0 | Status: DISCONTINUED | OUTPATIENT
Start: 2018-04-13 | End: 2018-04-14

## 2018-04-13 RX ORDER — HYDROMORPHONE HYDROCHLORIDE 2 MG/ML
0.25 INJECTION INTRAMUSCULAR; INTRAVENOUS; SUBCUTANEOUS
Qty: 0 | Refills: 0 | Status: DISCONTINUED | OUTPATIENT
Start: 2018-04-13 | End: 2018-04-14

## 2018-04-13 RX ORDER — ASPIRIN/CALCIUM CARB/MAGNESIUM 324 MG
81 TABLET ORAL DAILY
Qty: 0 | Refills: 0 | Status: DISCONTINUED | OUTPATIENT
Start: 2018-04-13 | End: 2018-04-19

## 2018-04-13 RX ORDER — SODIUM CHLORIDE 9 MG/ML
1000 INJECTION, SOLUTION INTRAVENOUS
Qty: 0 | Refills: 0 | Status: DISCONTINUED | OUTPATIENT
Start: 2018-04-13 | End: 2018-04-16

## 2018-04-13 RX ORDER — HYDROMORPHONE HYDROCHLORIDE 2 MG/ML
0.5 INJECTION INTRAMUSCULAR; INTRAVENOUS; SUBCUTANEOUS EVERY 4 HOURS
Qty: 0 | Refills: 0 | Status: DISCONTINUED | OUTPATIENT
Start: 2018-04-13 | End: 2018-04-19

## 2018-04-13 RX ORDER — ENOXAPARIN SODIUM 100 MG/ML
40 INJECTION SUBCUTANEOUS EVERY 24 HOURS
Qty: 0 | Refills: 0 | Status: DISCONTINUED | OUTPATIENT
Start: 2018-04-13 | End: 2018-04-19

## 2018-04-13 RX ORDER — PHENYLEPHRINE HYDROCHLORIDE 10 MG/ML
0.5 INJECTION INTRAVENOUS
Qty: 80 | Refills: 0 | Status: DISCONTINUED | OUTPATIENT
Start: 2018-04-13 | End: 2018-04-14

## 2018-04-13 RX ORDER — ATORVASTATIN CALCIUM 80 MG/1
20 TABLET, FILM COATED ORAL AT BEDTIME
Qty: 0 | Refills: 0 | Status: DISCONTINUED | OUTPATIENT
Start: 2018-04-13 | End: 2018-04-19

## 2018-04-13 RX ORDER — ONDANSETRON 8 MG/1
4 TABLET, FILM COATED ORAL ONCE
Qty: 0 | Refills: 0 | Status: DISCONTINUED | OUTPATIENT
Start: 2018-04-13 | End: 2018-04-14

## 2018-04-13 RX ADMIN — Medication 3 MILLILITER(S): at 11:54

## 2018-04-13 RX ADMIN — Medication 3 MILLILITER(S): at 05:29

## 2018-04-13 RX ADMIN — ATORVASTATIN CALCIUM 20 MILLIGRAM(S): 80 TABLET, FILM COATED ORAL at 23:09

## 2018-04-13 RX ADMIN — FINASTERIDE 5 MILLIGRAM(S): 5 TABLET, FILM COATED ORAL at 11:54

## 2018-04-13 RX ADMIN — Medication 12.5 MILLIGRAM(S): at 05:29

## 2018-04-13 RX ADMIN — BUDESONIDE AND FORMOTEROL FUMARATE DIHYDRATE 2 PUFF(S): 160; 4.5 AEROSOL RESPIRATORY (INHALATION) at 05:29

## 2018-04-13 RX ADMIN — VALSARTAN 40 MILLIGRAM(S): 80 TABLET ORAL at 05:29

## 2018-04-13 RX ADMIN — TAMSULOSIN HYDROCHLORIDE 0.4 MILLIGRAM(S): 0.4 CAPSULE ORAL at 11:54

## 2018-04-13 RX ADMIN — Medication 20 MILLIGRAM(S): at 05:29

## 2018-04-13 RX ADMIN — BUDESONIDE AND FORMOTEROL FUMARATE DIHYDRATE 2 PUFF(S): 160; 4.5 AEROSOL RESPIRATORY (INHALATION) at 23:08

## 2018-04-13 RX ADMIN — PHENYLEPHRINE HYDROCHLORIDE 20.55 MICROGRAM(S)/KG/MIN: 10 INJECTION INTRAVENOUS at 19:20

## 2018-04-13 RX ADMIN — Medication 3 MILLILITER(S): at 00:25

## 2018-04-13 RX ADMIN — SODIUM CHLORIDE 50 MILLILITER(S): 9 INJECTION, SOLUTION INTRAVENOUS at 20:30

## 2018-04-13 RX ADMIN — Medication 81 MILLIGRAM(S): at 12:45

## 2018-04-13 NOTE — BRIEF OPERATIVE NOTE - OPERATION/FINDINGS
Right lung with extensive loculation and hemothorax. Decortication was performed. Pleural biopsies taken.     Pleural biopsy on frozen was negative.

## 2018-04-13 NOTE — BRIEF OPERATIVE NOTE - PROCEDURE
<<-----Click on this checkbox to enter Procedure Decortication  of lung  04/13/2018    Active  [id number]  VATS (video-assisted thoracoscopic surgery)  04/13/2018    Active  [id number]

## 2018-04-13 NOTE — PROGRESS NOTE ADULT - PROBLEM SELECTOR PLAN 1
s/p thorocentesis by--radiology   prior pleural -cyto-negative prior  CTS Sue to take to OR for VATS bx 4/13

## 2018-04-13 NOTE — PROGRESS NOTE ADULT - SUBJECTIVE AND OBJECTIVE BOX
Infectious Diseases progress note:    Subjective:  Pt feels okay.  No sob, still with dry cough.  No wheezing.  AFebrile.  Awaiting VATS.  Wife present at bedside    ROS:  CONSTITUTIONAL:  No fever, chills, rigors  CARDIOVASCULAR:  No chest pain or palpitations  RESPIRATORY:   No SOB, cough, dyspnea on exertion.  No wheezing  GASTROINTESTINAL:  No abd pain, N/V, diarrhea/constipation  EXTREMITIES:  No swelling or joint pain  GENITOURINARY:  No burning on urination, increased frequency or urgency.  No flank pain  NEUROLOGIC:  No HA, visual disturbances  SKIN: No rashes    Allergies    penicillin ([place]ushing)    Intolerances        ANTIBIOTICS/RELEVANT:  antimicrobials    immunologic:    OTHER:  ALBUTerol/ipratropium for Nebulization 3 milliLiter(s) Nebulizer every 6 hours  aspirin  chewable 81 milliGRAM(s) Oral daily  atorvastatin 20 milliGRAM(s) Oral at bedtime  buDESOnide  80 MICROgram(s)/formoterol 4.5 MICROgram(s) Inhaler 2 Puff(s) Inhalation two times a day  enoxaparin Injectable 40 milliGRAM(s) SubCutaneous every 24 hours  finasteride 5 milliGRAM(s) Oral daily  furosemide    Tablet 20 milliGRAM(s) Oral daily  metoprolol tartrate 12.5 milliGRAM(s) Oral two times a day  ondansetron Injectable 4 milliGRAM(s) IV Push every 6 hours PRN  oxyCODONE    5 mG/acetaminophen 325 mG 2 Tablet(s) Oral every 6 hours PRN  tamsulosin 0.4 milliGRAM(s) Oral daily  valsartan 40 milliGRAM(s) Oral daily      Objective:  Vital Signs Last 24 Hrs  T(C): 37 (13 Apr 2018 05:34), Max: 37 (13 Apr 2018 05:05)  T(F): 98.6 (13 Apr 2018 05:34), Max: 98.6 (13 Apr 2018 05:05)  HR: 74 (13 Apr 2018 05:34) (73 - 86)  BP: 118/76 (13 Apr 2018 05:34) (109/67 - 121/73)  BP(mean): --  RR: 18 (13 Apr 2018 05:34) (18 - 18)  SpO2: 92% (13 Apr 2018 05:34) (92% - 97%)    PHYSICAL EXAM:  Constitutional:NAD  Eyes:SIMA, EOMI  Ear/Nose/Throat: no thrush, mucositis.  Moist mucous membranes	  Neck:no JVD, no lymphadenopathy, supple  Respiratory: CTA mitzi, no wheezing  Cardiovascular: S1S2 RRR, no murmurs  Gastrointestinal:soft, nontender,  nondistended (+) BS  Extremities:no e/e/c  Skin:  no rashes, open wounds or ulcerations        LABS:                        9.0    5.16  )-----------( 258      ( 13 Apr 2018 09:36 )             28.2     04-13    142  |  104  |  14  ----------------------------<  96  4.4   |  26  |  0.87    Ca    9.1      13 Apr 2018 07:27      PT/INR - ( 13 Apr 2018 09:35 )   PT: 16.2 sec;   INR: 1.42 ratio         PTT - ( 13 Apr 2018 09:35 )  PTT:33.3 sec        Rapid RVP Result: Detected          MICROBIOLOGY:    Culture - Blood (04.10.18 @ 13:43)    Specimen Source: .Blood Blood-Venous    Culture Results:   No growth to date.    Culture - Blood (04.10.18 @ 13:43)    Specimen Source: .Blood Blood-Peripheral    Culture Results:   No growth to date.          RADIOLOGY & ADDITIONAL STUDIES:

## 2018-04-13 NOTE — PROGRESS NOTE ADULT - SUBJECTIVE AND OBJECTIVE BOX
VITAL SIGNS  T(F): 97.9  HR: 82  BP: 117/64  RR: 16  SpO2: 100%    04-13-18 @ 07:01  -  04-13-18 @ 23:44  --------------------------------------------------------  OUT: 625 mL / NET: -625 mL      Weight (kg): 109.6 (04-13-18 @ 05:34)  LAB                        9.5    10.4  )-----------( 268      ( 13 Apr 2018 19:47 )             28.0   139  |  101  |  13  ----------------------------<  141<H>  4.3   |  25  |  0.81      CAPILLARY BLOOD GLUCOSE    DIAGNOSTICS    MEDICATIONS  atorvastatin 20 milliGRAM(s) Oral at bedtime  buDESOnide  80 MICROgram(s)/formoterol 4.5 MICROgram(s) Inhaler 2 Puff(s) Inhalation two times a day  phenylephrine    Infusion 0.5 MICROgram(s)/kG/Min IV Continuous <Continuous>      PHYSICAL EXAM  GEN: No apparent distress, examined in bed, communicative, able to lift head off pillow  NEURO: Non-focal,   CV: S1 S2 without rub or murmur  DRAINS:   R Pleural x 2 Anterior/posterior  Drainage:  serosang 20cc/50cc  sutures intact  PULMONARY:  CTA, Decreased left base   INCENTIVE SPIROMETRY:  ABDOMEN:  Soft, non-tender, non-distended, bowel sounds rare x4  : sutton  VASCULAR: +pp +radial  SKIN: Warm, dry, intact     MUSCULOSKELETAL:  Moves all extremities equally

## 2018-04-13 NOTE — BRIEF OPERATIVE NOTE - COMMENTS
Started on solitario after intubation, quickly weaned off. Required solitario again after extubation

## 2018-04-13 NOTE — PROGRESS NOTE ADULT - ASSESSMENT
68 yo male presenting with 2 day hx of acutely worsening cough and fever.  fever high of 101.3 this morning.  productive cough which has been going on for a few days. yellowish sputum.  did not take anything for his symptoms     Problem/Plan - 1:  ·  Problem: Acute febrile illness.  Plan: fredrick viral  ID fu appreciated  will monitor.     Problem/Plan - 2:  ·  Problem: Pleural effusion.  Plan: pulmonary fu  VATS today

## 2018-04-13 NOTE — PROGRESS NOTE ADULT - SUBJECTIVE AND OBJECTIVE BOX
Patient is a 67y old  Male who presents with a chief complaint of fever and cough (11 Apr 2018 10:02)      INTERVAL HPI/OVERNIGHT EVENTS:  T(C): 36.2 (04-13-18 @ 18:45), Max: 37 (04-13-18 @ 05:05)  HR: 74 (04-13-18 @ 19:45) (67 - 86)  BP: 106/60 (04-13-18 @ 19:45) (102/56 - 118/76)  RR: 15 (04-13-18 @ 19:45) (14 - 18)  SpO2: 100% (04-13-18 @ 19:45) (91% - 100%)  Wt(kg): --  I&O's Summary    12 Apr 2018 07:01  -  13 Apr 2018 07:00  --------------------------------------------------------  IN: 340 mL / OUT: 0 mL / NET: 340 mL    13 Apr 2018 07:01  -  13 Apr 2018 19:53  --------------------------------------------------------  IN: 0 mL / OUT: 70 mL / NET: -70 mL        LABS:                        9.0    5.16  )-----------( 258      ( 13 Apr 2018 09:36 )             28.2     04-13    142  |  104  |  14  ----------------------------<  96  4.4   |  26  |  0.87    Ca    9.1      13 Apr 2018 07:27      PT/INR - ( 13 Apr 2018 09:35 )   PT: 16.2 sec;   INR: 1.42 ratio         PTT - ( 13 Apr 2018 09:35 )  PTT:33.3 sec    CAPILLARY BLOOD GLUCOSE                MEDICATIONS  (STANDING):  aspirin  chewable 81 milliGRAM(s) Oral daily  atorvastatin 20 milliGRAM(s) Oral at bedtime  buDESOnide  80 MICROgram(s)/formoterol 4.5 MICROgram(s) Inhaler 2 Puff(s) Inhalation two times a day  enoxaparin Injectable 40 milliGRAM(s) SubCutaneous every 24 hours  lactated ringers. 1000 milliLiter(s) (50 mL/Hr) IV Continuous <Continuous>  phenylephrine    Infusion 0.5 MICROgram(s)/kG/Min (20.55 mL/Hr) IV Continuous <Continuous>    MEDICATIONS  (PRN):  acetaminophen  IVPB. 1000 milliGRAM(s) IV Intermittent once PRN Mild Pain (1 - 3)  HYDROmorphone  Injectable 0.5 milliGRAM(s) IV Push every 4 hours PRN Moderate Pain (4 - 6)  HYDROmorphone  Injectable 0.25 milliGRAM(s) IV Push every 10 minutes PRN Moderate Pain  HYDROmorphone  Injectable 0.5 milliGRAM(s) IV Push once PRN Severe Pain (7 - 10)  ondansetron Injectable 4 milliGRAM(s) IV Push once PRN Nausea and/or Vomiting          PHYSICAL EXAM:  GENERAL: NAD, well-groomed, well-developed  HEAD:  Atraumatic, Normocephalic  CHEST/LUNG: Clear to percussion bilaterally; No rales, rhonchi, wheezing, or rubs  HEART: Regular rate and rhythm; No murmurs, rubs, or gallops  ABDOMEN: Soft, Nontender, Nondistended; Bowel sounds present  EXTREMITIES:  2+ Peripheral Pulses, No clubbing, cyanosis, or edema  LYMPH: No lymphadenopathy noted  SKIN: No rashes or lesions    Care Discussed with Consultants/Other Providers [ +] YES  [ ] NO

## 2018-04-13 NOTE — PROGRESS NOTE ADULT - ASSESSMENT
67 M with a PMH of HTN, HLD, CAD, diastolic dysfunction,  s/p MV annuloplasty, A fib on coumadin, s/p recent hospitalization for SOB with draiange of R pleural effusion, now returning with enterorhino virus infection and SOB with a persistent loculated R pleural effusion mildly increased in size.     Recurring R pleural Effusion:  - Pt seen by thoracic surgery, plan for VATs with biopsy on Thursday  - Currently saturating well on RA  - Would suggest Duonebs Q6H  - Can start Symbicort BID  - Chest PT, incentive spirometry/ acapella Q6H  - Would hold AC in anticipation of OR. May require a heparin gtt once INR <2--consider vit. K 67 M with a PMH of HTN, HLD, CAD, diastolic dysfunction,  s/p MV annuloplasty, A fib on coumadin, s/p recent hospitalization for SOB with draiange of R pleural effusion, now returning with enterorhino virus infection and SOB with a persistent loculated R pleural effusion mildly increased in size.     Recurring R pleural Effusion:  - Pt seen by thoracic surgery, plan for VATs with biopsy on Thursday  - Currently saturating well on RA  - Would suggest Duonebs Q6H  - Can start Symbicort BID  - Chest PT, incentive spirometry/ acapella Q6H  -AC held for OR today 4/13

## 2018-04-13 NOTE — PROGRESS NOTE ADULT - ASSESSMENT
67M with PMHx of Afib on Coumadin, HTN, CAD s/p CABG, MVR, admitted for progressive BROOKS s/p Rt thoracentesis 2 weeks ago. Pt s/p Chest CT today with reoccurrence of small right pleural effusion without enhancement to suggest empyema and adjacent partial right lower lobe atelectasis. Underwent  Right VATS pleural biopsy and drainage of pleural effusion.

## 2018-04-13 NOTE — PROGRESS NOTE ADULT - SUBJECTIVE AND OBJECTIVE BOX
CHIEF COMPLAINT:    Interval Events:    REVIEW OF SYSTEMS:  Constitutional: No fevers or chills. No weight loss. No fatigue or generalized malaise.  Eyes: No itching or discharge from the eyes  ENT: No ear pain. No ear discharge. No nasal congestion. No post nasal drip. No epistaxis. No throat pain. No sore throat. No difficulty swallowing.   CV: No chest pain. No palpitations. No lightheadedness or dizziness.   Resp: No dyspnea at rest. No dyspnea on exertion. No orthopnea. No wheezing. No cough. No stridor. No sputum production. No chest pain with respiration.  GI: No nausea. No vomiting. No diarrhea.  MSK: No joint pain or pain in any extremities  Integumentary: No skin lesions. No pedal edema.  Neurological: No gross motor weakness. No sensory changes.  [ ] All other systems negative  [ ] Unable to assess ROS because ________    OBJECTIVE:  ICU Vital Signs Last 24 Hrs  T(C): 36.9 (12 Apr 2018 20:31), Max: 37.1 (12 Apr 2018 05:38)  T(F): 98.4 (12 Apr 2018 20:31), Max: 98.8 (12 Apr 2018 05:38)  HR: 86 (12 Apr 2018 20:31) (73 - 92)  BP: 109/67 (12 Apr 2018 20:31) (109/67 - 124/74)  BP(mean): --  ABP: --  ABP(mean): --  RR: 18 (12 Apr 2018 20:31) (18 - 18)  SpO2: 96% (12 Apr 2018 20:31) (92% - 97%)        04-11 @ 07:01 - 04-12 @ 07:00  --------------------------------------------------------  IN: 480 mL / OUT: 0 mL / NET: 480 mL    04-12 @ 07:01  - 04-13 @ 04:53  --------------------------------------------------------  IN: 340 mL / OUT: 0 mL / NET: 340 mL      CAPILLARY BLOOD GLUCOSE          PHYSICAL EXAM:  General: Awake, alert, oriented X 3.   HEENT: Atraumatic, normocephalic.                 Mallampatti Grade                 No nasal congestion.                No tonsillar or pharyngeal exudates.  Lymph Nodes: No palpable lymphadenopathy  Neck: No JVD. No carotid bruit.   Respiratory: Normal chest expansion                         Normal percussion                         Normal and equal air entry                         No wheeze, rhonchi or rales.  Cardiovascular: S1 S2 normal. No murmurs, rubs or gallops.   Abdomen: Soft, non-tender, non-distended. No organomegaly.  Extremities: Warm to touch. Peripheral pulse palpable. No pedal edema.   Skin: No rashes or skin lesions  Neurological: Motor and sensory examination equal and normal in all four extremities.  Psychiatry: Appropriate mood and affect.    HOSPITAL MEDICATIONS:  MEDICATIONS  (STANDING):  ALBUTerol/ipratropium for Nebulization 3 milliLiter(s) Nebulizer every 6 hours  aspirin  chewable 81 milliGRAM(s) Oral daily  atorvastatin 20 milliGRAM(s) Oral at bedtime  buDESOnide  80 MICROgram(s)/formoterol 4.5 MICROgram(s) Inhaler 2 Puff(s) Inhalation two times a day  enoxaparin Injectable 40 milliGRAM(s) SubCutaneous every 24 hours  finasteride 5 milliGRAM(s) Oral daily  furosemide    Tablet 20 milliGRAM(s) Oral daily  metoprolol tartrate 12.5 milliGRAM(s) Oral two times a day  tamsulosin 0.4 milliGRAM(s) Oral daily  valsartan 40 milliGRAM(s) Oral daily    MEDICATIONS  (PRN):  ondansetron Injectable 4 milliGRAM(s) IV Push every 6 hours PRN Nausea  oxyCODONE    5 mG/acetaminophen 325 mG 2 Tablet(s) Oral every 6 hours PRN Moderate Pain (4 - 6)      LABS:                        9.3    5.57  )-----------( 252      ( 12 Apr 2018 07:47 )             29.1           PT/INR - ( 12 Apr 2018 06:53 )   PT: 17.8 sec;   INR: 1.63 ratio         PTT - ( 12 Apr 2018 06:53 )  PTT:34.8 sec          MICROBIOLOGY:     RADIOLOGY:  [ ] Reviewed and interpreted by me    Point of Care Ultrasound Findings:    PFT:    EKG: CHIEF COMPLAINT: better sleep , ambulating well-still coughing    Interval Events: for VATS today 2:30    REVIEW OF SYSTEMS:  Constitutional: No fevers or chills. No weight loss. No fatigue or generalized malaise.  Eyes: No itching or discharge from the eyes  ENT: No ear pain. No ear discharge. No nasal congestion. No post nasal drip. No epistaxis. No throat pain. No sore throat. No difficulty swallowing.   CV: No chest pain. No palpitations. No lightheadedness or dizziness.   Resp: No dyspnea at rest. No dyspnea on exertion. No orthopnea. No wheezing. No cough. No stridor. No sputum production. No chest pain with respiration.  GI: No nausea. No vomiting. No diarrhea.  MSK: No joint pain or pain in any extremities  Integumentary: No skin lesions. No pedal edema.  Neurological: No gross motor weakness. No sensory changes.  [+ ] All other systems negative  [ ] Unable to assess ROS because ________    OBJECTIVE:  ICU Vital Signs Last 24 Hrs  T(C): 36.9 (12 Apr 2018 20:31), Max: 37.1 (12 Apr 2018 05:38)  T(F): 98.4 (12 Apr 2018 20:31), Max: 98.8 (12 Apr 2018 05:38)  HR: 86 (12 Apr 2018 20:31) (73 - 92)  BP: 109/67 (12 Apr 2018 20:31) (109/67 - 124/74)  BP(mean): --  ABP: --  ABP(mean): --  RR: 18 (12 Apr 2018 20:31) (18 - 18)  SpO2: 96% (12 Apr 2018 20:31) (92% - 97%)        04-11 @ 07:01  -  04-12 @ 07:00  --------------------------------------------------------  IN: 480 mL / OUT: 0 mL / NET: 480 mL    04-12 @ 07:01  - 04-13 @ 04:53  --------------------------------------------------------  IN: 340 mL / OUT: 0 mL / NET: 340 mL      CAPILLARY BLOOD GLUCOSE          PHYSICAL EXAM: NAD in bed  General: Awake, alert, oriented X 3.   HEENT: Atraumatic, normocephalic.                 Mallampatti Grade 3                No nasal congestion.                No tonsillar or pharyngeal exudates.  Lymph Nodes: No palpable lymphadenopathy  Neck: No JVD. No carotid bruit.   Respiratory: abnormal chest expansion                         abnormal percussion--right 1/2 up                         Normal and equal air entry                         No wheeze, rhonchi or rales.  Cardiovascular: S1 S2 normal. No murmurs, rubs or gallops.   Abdomen: Soft, non-tender, non-distended. No organomegaly.  Extremities: Warm to touch. Peripheral pulse palpable. No pedal edema.   Skin: No rashes or skin lesions  Neurological: Motor and sensory examination equal and normal in all four extremities.  Psychiatry: Appropriate mood and affect.    HOSPITAL MEDICATIONS:  MEDICATIONS  (STANDING):  ALBUTerol/ipratropium for Nebulization 3 milliLiter(s) Nebulizer every 6 hours  aspirin  chewable 81 milliGRAM(s) Oral daily  atorvastatin 20 milliGRAM(s) Oral at bedtime  buDESOnide  80 MICROgram(s)/formoterol 4.5 MICROgram(s) Inhaler 2 Puff(s) Inhalation two times a day  enoxaparin Injectable 40 milliGRAM(s) SubCutaneous every 24 hours  finasteride 5 milliGRAM(s) Oral daily  furosemide    Tablet 20 milliGRAM(s) Oral daily  metoprolol tartrate 12.5 milliGRAM(s) Oral two times a day  tamsulosin 0.4 milliGRAM(s) Oral daily  valsartan 40 milliGRAM(s) Oral daily    MEDICATIONS  (PRN):  ondansetron Injectable 4 milliGRAM(s) IV Push every 6 hours PRN Nausea  oxyCODONE    5 mG/acetaminophen 325 mG 2 Tablet(s) Oral every 6 hours PRN Moderate Pain (4 - 6)      LABS:                        9.3    5.57  )-----------( 252      ( 12 Apr 2018 07:47 )             29.1           PT/INR - ( 12 Apr 2018 06:53 )   PT: 17.8 sec;   INR: 1.63 ratio         PTT - ( 12 Apr 2018 06:53 )  PTT:34.8 sec          MICROBIOLOGY:     RADIOLOGY:  [ ] Reviewed and interpreted by me    Point of Care Ultrasound Findings:    PFT:    EKG:

## 2018-04-13 NOTE — PROGRESS NOTE ADULT - ASSESSMENT
67 M with a PMH of HTN, HLD, CAD, diastolic dysfunction,  s/p MV annuloplasty, A fib on coumadin, s/p recent hospital admission from 3/25-3/29/18 during which he underwent a R thoracentesis with removal of 1 L. The pleural fluid studies were consistent with exudative fluid , cytopath was negative for malignancy, cultures negative.  He now returns with complaints of fever of 101 F for 1-2 days along with a cough productive of clear and occasionally yellow sputum as well as worsening shortness of breath. He reports that the shortness of breath has been gradually worsening for the past 1.5 week. His exercise tolerance has been increasingly limited due to the SOB. He denies any chest pain, nausea, vomiting, diarrhea dysuria or hematuria but does report mild intermittent wheezing. An RVP was positive for entero rhino virus and a Ct chest showed increase in a small to moderate loculated R pleural effusion.     ER vitals:  T 98.4, P 88, /81, RR 26, 97% RA.   Pt was given a dose of vanco/levaquin in the ER.  Pt seen by thoracic surgery and is awaiting R VATS with pleural drainage and biopsy.     Problem/Plan - 1:    ·	Upper respiratory viral infection    - RVP (+) entero/rhinovirus.  No role for antivirals, cont supportive care including duonebs, cont symbicort.  Wheezing improved    - Do not suspect superimposed bacterial pneumonia. Will monitor off abx for now.  Pt without fever or leukocytosis.  No purulent phlegm.       Problem/Plan - 2:    ·	Right pleural effusion    - Increased from last admission.  Pending VATS, CTS following    - f/u pleural fluid analysis, and culture data    Will follow,    Josselin Echeverriai    [phone]

## 2018-04-14 LAB
ALBUMIN SERPL ELPH-MCNC: 3.3 G/DL — SIGNIFICANT CHANGE UP (ref 3.3–5)
ALP SERPL-CCNC: 43 U/L — SIGNIFICANT CHANGE UP (ref 40–120)
ALT FLD-CCNC: 13 U/L RC — SIGNIFICANT CHANGE UP (ref 10–45)
ANION GAP SERPL CALC-SCNC: 11 MMOL/L — SIGNIFICANT CHANGE UP (ref 5–17)
APTT BLD: 31 SEC — SIGNIFICANT CHANGE UP (ref 27.5–37.4)
AST SERPL-CCNC: 15 U/L — SIGNIFICANT CHANGE UP (ref 10–40)
BILIRUB SERPL-MCNC: 0.6 MG/DL — SIGNIFICANT CHANGE UP (ref 0.2–1.2)
BUN SERPL-MCNC: 12 MG/DL — SIGNIFICANT CHANGE UP (ref 7–23)
CALCIUM SERPL-MCNC: 9.2 MG/DL — SIGNIFICANT CHANGE UP (ref 8.4–10.5)
CHLORIDE SERPL-SCNC: 102 MMOL/L — SIGNIFICANT CHANGE UP (ref 96–108)
CO2 SERPL-SCNC: 27 MMOL/L — SIGNIFICANT CHANGE UP (ref 22–31)
CREAT SERPL-MCNC: 0.79 MG/DL — SIGNIFICANT CHANGE UP (ref 0.5–1.3)
GLUCOSE SERPL-MCNC: 135 MG/DL — HIGH (ref 70–99)
GRAM STN FLD: SIGNIFICANT CHANGE UP
HCT VFR BLD CALC: 27.9 % — LOW (ref 39–50)
HGB BLD-MCNC: 9.6 G/DL — LOW (ref 13–17)
INR BLD: 1.68 RATIO — HIGH (ref 0.88–1.16)
MCHC RBC-ENTMCNC: 30.5 PG — SIGNIFICANT CHANGE UP (ref 27–34)
MCHC RBC-ENTMCNC: 34.5 GM/DL — SIGNIFICANT CHANGE UP (ref 32–36)
MCV RBC AUTO: 88.3 FL — SIGNIFICANT CHANGE UP (ref 80–100)
PLATELET # BLD AUTO: 264 K/UL — SIGNIFICANT CHANGE UP (ref 150–400)
POTASSIUM SERPL-MCNC: 4.5 MMOL/L — SIGNIFICANT CHANGE UP (ref 3.5–5.3)
POTASSIUM SERPL-SCNC: 4.5 MMOL/L — SIGNIFICANT CHANGE UP (ref 3.5–5.3)
PROT SERPL-MCNC: 6.5 G/DL — SIGNIFICANT CHANGE UP (ref 6–8.3)
PROTHROM AB SERPL-ACNC: 18.3 SEC — HIGH (ref 9.8–12.7)
RBC # BLD: 3.16 M/UL — LOW (ref 4.2–5.8)
RBC # FLD: 12.2 % — SIGNIFICANT CHANGE UP (ref 10.3–14.5)
SODIUM SERPL-SCNC: 140 MMOL/L — SIGNIFICANT CHANGE UP (ref 135–145)
SPECIMEN SOURCE: SIGNIFICANT CHANGE UP
WBC # BLD: 8.5 K/UL — SIGNIFICANT CHANGE UP (ref 3.8–10.5)
WBC # FLD AUTO: 8.5 K/UL — SIGNIFICANT CHANGE UP (ref 3.8–10.5)

## 2018-04-14 PROCEDURE — 71045 X-RAY EXAM CHEST 1 VIEW: CPT | Mod: 26

## 2018-04-14 RX ORDER — FINASTERIDE 5 MG/1
5 TABLET, FILM COATED ORAL DAILY
Qty: 0 | Refills: 0 | Status: DISCONTINUED | OUTPATIENT
Start: 2018-04-14 | End: 2018-04-19

## 2018-04-14 RX ORDER — ACETAMINOPHEN 500 MG
1000 TABLET ORAL ONCE
Qty: 0 | Refills: 0 | Status: COMPLETED | OUTPATIENT
Start: 2018-04-14 | End: 2018-04-14

## 2018-04-14 RX ORDER — ALBUMIN HUMAN 25 %
250 VIAL (ML) INTRAVENOUS ONCE
Qty: 0 | Refills: 0 | Status: COMPLETED | OUTPATIENT
Start: 2018-04-14 | End: 2018-04-14

## 2018-04-14 RX ORDER — TAMSULOSIN HYDROCHLORIDE 0.4 MG/1
0.4 CAPSULE ORAL DAILY
Qty: 0 | Refills: 0 | Status: DISCONTINUED | OUTPATIENT
Start: 2018-04-14 | End: 2018-04-19

## 2018-04-14 RX ADMIN — Medication 1000 MILLIGRAM(S): at 03:47

## 2018-04-14 RX ADMIN — Medication 125 MILLILITER(S): at 02:23

## 2018-04-14 RX ADMIN — Medication 400 MILLIGRAM(S): at 16:28

## 2018-04-14 RX ADMIN — SODIUM CHLORIDE 50 MILLILITER(S): 9 INJECTION, SOLUTION INTRAVENOUS at 22:54

## 2018-04-14 RX ADMIN — BUDESONIDE AND FORMOTEROL FUMARATE DIHYDRATE 2 PUFF(S): 160; 4.5 AEROSOL RESPIRATORY (INHALATION) at 12:48

## 2018-04-14 RX ADMIN — FINASTERIDE 5 MILLIGRAM(S): 5 TABLET, FILM COATED ORAL at 22:54

## 2018-04-14 RX ADMIN — TAMSULOSIN HYDROCHLORIDE 0.4 MILLIGRAM(S): 0.4 CAPSULE ORAL at 22:54

## 2018-04-14 RX ADMIN — Medication 1000 MILLIGRAM(S): at 17:17

## 2018-04-14 RX ADMIN — Medication 400 MILLIGRAM(S): at 03:45

## 2018-04-14 RX ADMIN — Medication 81 MILLIGRAM(S): at 12:47

## 2018-04-14 RX ADMIN — ATORVASTATIN CALCIUM 20 MILLIGRAM(S): 80 TABLET, FILM COATED ORAL at 22:54

## 2018-04-14 NOTE — PROGRESS NOTE ADULT - ASSESSMENT
66 yo male presenting with 2 day hx of acutely worsening cough and fever.  fever high of 101.3 this morning.  productive cough which has been going on for a few days. yellowish sputum.  did not take anything for his symptoms     Problem/Plan - 1:  ·  Problem: Acute febrile illness.  Plan: fredrick viral  ID fu appreciated  will monitor.     Problem/Plan - 2:  ·  Problem: Pleural effusion.  Plan: pulmonary fu  sp VATS

## 2018-04-14 NOTE — PROGRESS NOTE ADULT - SUBJECTIVE AND OBJECTIVE BOX
Interval Events: Patient was seen and examined at bedside.  s/p R vats and pleural biopsy yesterday. Feels well. Denies pain or SOB    REVIEW OF SYSTEMS:  Constitutional: [ -] fevers [- ] chills [ ] weight loss [ ] weight gain  CV: [ -] chest pain [- ] orthopnea [ ] palpitations [ ] murmur  Resp: [- ] cough [ -] shortness of breath [- ] dyspnea [ -] wheezing [ ] sputum [ ] hemoptysis  [x ] All other systems negative  [ ] Unable to assess ROS because ________    OBJECTIVE:  ICU Vital Signs Last 24 Hrs  T(C): 36.5 (14 Apr 2018 08:30), Max: 36.8 (14 Apr 2018 06:00)  T(F): 97.7 (14 Apr 2018 08:30), Max: 98.2 (14 Apr 2018 06:00)  HR: 76 (14 Apr 2018 10:00) (63 - 91)  BP: 107/58 (14 Apr 2018 10:00) (99/58 - 121/67)  BP(mean): 78 (14 Apr 2018 10:00) (71 - 92)  ABP: 112/49 (14 Apr 2018 10:00) (98/45 - 125/55)  ABP(mean): 69 (14 Apr 2018 10:00) (64 - 626)  RR: 16 (14 Apr 2018 10:00) (14 - 18)  SpO2: 100% (14 Apr 2018 10:00) (91% - 100%)        04-13 @ 07:01 - 04-14 @ 07:00  --------------------------------------------------------  IN: 893.4 mL / OUT: 1395 mL / NET: -501.6 mL    04-14 @ 07:01  -  04-14 @ 10:33  --------------------------------------------------------  IN: 150 mL / OUT: 385 mL / NET: -235 mL      CAPILLARY BLOOD GLUCOSE          PHYSICAL EXAM:  General: nad, well appearing  HEENT: op clear  Lymph Nodes: No palpable lymphadenopathy  Neck: supple  Respiratory: decreased breath sounds right base, otherwise clear  Cardiovascular: irregular  Abdomen: SNT, +BS, No R/G/R  Extremities: no edema  Skin: no rash  Neurological: grossly intact      HOSPITAL MEDICATIONS:  MEDICATIONS  (STANDING):  aspirin  chewable 81 milliGRAM(s) Oral daily  atorvastatin 20 milliGRAM(s) Oral at bedtime  buDESOnide  80 MICROgram(s)/formoterol 4.5 MICROgram(s) Inhaler 2 Puff(s) Inhalation two times a day  enoxaparin Injectable 40 milliGRAM(s) SubCutaneous every 24 hours  lactated ringers. 1000 milliLiter(s) (50 mL/Hr) IV Continuous <Continuous>    MEDICATIONS  (PRN):  HYDROmorphone  Injectable 0.5 milliGRAM(s) IV Push every 4 hours PRN Moderate Pain (4 - 6)  HYDROmorphone  Injectable 0.25 milliGRAM(s) IV Push every 10 minutes PRN Moderate Pain  HYDROmorphone  Injectable 0.5 milliGRAM(s) IV Push once PRN Severe Pain (7 - 10)  ondansetron Injectable 4 milliGRAM(s) IV Push once PRN Nausea and/or Vomiting      LABS:                        9.6    8.5   )-----------( 264      ( 14 Apr 2018 01:03 )             27.9     Hgb Trend: 9.6<--, 9.5<--, 9.0<--, 9.3<--, 10.3<--  04-14    140  |  102  |  12  ----------------------------<  135<H>  4.5   |  27  |  0.79    Ca    9.2      14 Apr 2018 01:03  Phos  3.8     04-13  Mg     1.8     04-13    TPro  6.5  /  Alb  3.3  /  TBili  0.6  /  DBili  x   /  AST  15  /  ALT  13  /  AlkPhos  43  04-14    Creatinine Trend: 0.79<--, 0.81<--, 0.87<--, 0.93<--, 0.84<--, 0.84<--  PT/INR - ( 14 Apr 2018 01:03 )   PT: 18.3 sec;   INR: 1.68 ratio         PTT - ( 14 Apr 2018 01:03 )  PTT:31.0 sec

## 2018-04-14 NOTE — PROGRESS NOTE ADULT - SUBJECTIVE AND OBJECTIVE BOX
Subjective: Pt states" " Denies any pain, SOB, palpitations. No acute events overnight.    Vital Signs:  Vital Signs Last 24 Hrs  T(C): 36.5 (04-14-18 @ 08:30), Max: 36.8 (04-14-18 @ 06:00)  T(F): 97.7 (04-14-18 @ 08:30), Max: 98.2 (04-14-18 @ 06:00)  HR: 63 (04-14-18 @ 08:30) (63 - 91)  BP: 110/64 (04-14-18 @ 08:30) (99/58 - 121/67)  RR: 16 (04-14-18 @ 08:30) (14 - 18)  SpO2: 100% (04-14-18 @ 08:30) (91% - 100%) on (O2)        Relevant labs, radiology and Medications reviewed                        9.6    8.5   )-----------( 264      ( 14 Apr 2018 01:03 )             27.9     04-14    140  |  102  |  12  ----------------------------<  135<H>  4.5   |  27  |  0.79    Ca    9.2      14 Apr 2018 01:03  Phos  3.8     04-13  Mg     1.8     04-13    TPro  6.5  /  Alb  3.3  /  TBili  0.6  /  DBili  x   /  AST  15  /  ALT  13  /  AlkPhos  43  04-14    PT/INR - ( 14 Apr 2018 01:03 )   PT: 18.3 sec;   INR: 1.68 ratio         PTT - ( 14 Apr 2018 01:03 )  PTT:31.0 sec  MEDICATIONS  (STANDING):  aspirin  chewable 81 milliGRAM(s) Oral daily  atorvastatin 20 milliGRAM(s) Oral at bedtime  buDESOnide  80 MICROgram(s)/formoterol 4.5 MICROgram(s) Inhaler 2 Puff(s) Inhalation two times a day  enoxaparin Injectable 40 milliGRAM(s) SubCutaneous every 24 hours  lactated ringers. 1000 milliLiter(s) (50 mL/Hr) IV Continuous <Continuous>    MEDICATIONS  (PRN):  HYDROmorphone  Injectable 0.5 milliGRAM(s) IV Push every 4 hours PRN Moderate Pain (4 - 6)  HYDROmorphone  Injectable 0.25 milliGRAM(s) IV Push every 10 minutes PRN Moderate Pain  HYDROmorphone  Injectable 0.5 milliGRAM(s) IV Push once PRN Severe Pain (7 - 10)  ondansetron Injectable 4 milliGRAM(s) IV Push once PRN Nausea and/or Vomiting      I&O's Summary    13 Apr 2018 07:01  -  14 Apr 2018 07:00  --------------------------------------------------------  IN: 893.4 mL / OUT: 1395 mL / NET: -501.6 mL    14 Apr 2018 07:01  -  14 Apr 2018 09:22  --------------------------------------------------------  IN: 100 mL / OUT: 285 mL / NET: -185 mL        IMAGING    CXR: reviewed    CT Chest:    PAST MEDICAL & SURGICAL HISTORY:  Kidney stones  Hyperlipidemia  Hypertension  CAD (coronary artery disease)  H/O mitral valve replacement       Physical Exam:  Neurology: A&Ox3, nonfocal, IQBAL x 4, NAD  Respiratory: B/L BS CTA, diminished at bases, No wheezing, rales, rhonchi  CV: RRR, S1S2

## 2018-04-14 NOTE — PROGRESS NOTE ADULT - ASSESSMENT
67M with PMHx of Afib on Coumadin, HTN, CAD s/p CABG, MVR, admitted for progressive BROOKS s/p Rt thoracentesis 2 weeks ago. Pt s/p Chest CT today with reoccurrence of small right pleural effusion without enhancement to suggest empyema and adjacent partial right lower lobe atelectasis. Underwent  Right lung with extensive loculation and hemothorax. Decortication was performed. Pleural biopsies taken.   4/14: Stable for transfer to floor-2 DSU

## 2018-04-14 NOTE — PROGRESS NOTE ADULT - PROBLEM SELECTOR PLAN 1
Continue Chest tubes to wall suction.  CXR stable this am.  Continue current pain regimen  INcentive spirometry to bed side

## 2018-04-14 NOTE — PROGRESS NOTE ADULT - SUBJECTIVE AND OBJECTIVE BOX
Patient is a 67y old  Male who presents with a chief complaint of fever and cough (11 Apr 2018 10:02)      INTERVAL HPI/OVERNIGHT EVENTS:  T(C): 37.4 (04-14-18 @ 16:05), Max: 37.4 (04-14-18 @ 15:00)  HR: 85 (04-14-18 @ 16:05) (63 - 91)  BP: 113/72 (04-14-18 @ 16:05) (99/58 - 121/67)  RR: 18 (04-14-18 @ 16:05) (15 - 18)  SpO2: 94% (04-14-18 @ 16:05) (94% - 100%)  Wt(kg): --  I&O's Summary    13 Apr 2018 07:01  -  14 Apr 2018 07:00  --------------------------------------------------------  IN: 893.4 mL / OUT: 1395 mL / NET: -501.6 mL    14 Apr 2018 07:01  -  14 Apr 2018 19:54  --------------------------------------------------------  IN: 1280 mL / OUT: 881 mL / NET: 399 mL        LABS:                        9.6    8.5   )-----------( 264      ( 14 Apr 2018 01:03 )             27.9     04-14    140  |  102  |  12  ----------------------------<  135<H>  4.5   |  27  |  0.79    Ca    9.2      14 Apr 2018 01:03  Phos  3.8     04-13  Mg     1.8     04-13    TPro  6.5  /  Alb  3.3  /  TBili  0.6  /  DBili  x   /  AST  15  /  ALT  13  /  AlkPhos  43  04-14    PT/INR - ( 14 Apr 2018 01:03 )   PT: 18.3 sec;   INR: 1.68 ratio         PTT - ( 14 Apr 2018 01:03 )  PTT:31.0 sec    CAPILLARY BLOOD GLUCOSE                MEDICATIONS  (STANDING):  aspirin  chewable 81 milliGRAM(s) Oral daily  atorvastatin 20 milliGRAM(s) Oral at bedtime  buDESOnide  80 MICROgram(s)/formoterol 4.5 MICROgram(s) Inhaler 2 Puff(s) Inhalation two times a day  enoxaparin Injectable 40 milliGRAM(s) SubCutaneous every 24 hours  finasteride 5 milliGRAM(s) Oral daily  lactated ringers. 1000 milliLiter(s) (50 mL/Hr) IV Continuous <Continuous>  tamsulosin 0.4 milliGRAM(s) Oral daily    MEDICATIONS  (PRN):  HYDROmorphone  Injectable 0.5 milliGRAM(s) IV Push every 4 hours PRN Moderate Pain (4 - 6)          PHYSICAL EXAM:  GENERAL: frail  CHEST/LUNG: Clear to percussion bilaterally; No rales, rhonchi, wheezing, or rubs  HEART: Regular rate and rhythm; No murmurs, rubs, or gallops  ABDOMEN: Soft, Nontender, Nondistended; Bowel sounds present  EXTREMITIES:  2+ Peripheral Pulses, No clubbing, cyanosis, or edema  LYMPH: No lymphadenopathy noted  SKIN: No rashes or lesions    Care Discussed with Consultants/Other Providers [+ ] YES  [ ] NO

## 2018-04-14 NOTE — PROGRESS NOTE ADULT - ASSESSMENT
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 S/p R vats and pleural biopsy on 4/13.   Doing well today.   -- f/u pleural biopsy results  -- c/w Symbicort  -- add Duoneb q 6 prn  -- Pain control, incentive spirometry    Camila Clements MD

## 2018-04-15 LAB
ANION GAP SERPL CALC-SCNC: 10 MMOL/L — SIGNIFICANT CHANGE UP (ref 5–17)
BUN SERPL-MCNC: 9 MG/DL — SIGNIFICANT CHANGE UP (ref 7–23)
CALCIUM SERPL-MCNC: 8.8 MG/DL — SIGNIFICANT CHANGE UP (ref 8.4–10.5)
CHLORIDE SERPL-SCNC: 100 MMOL/L — SIGNIFICANT CHANGE UP (ref 96–108)
CO2 SERPL-SCNC: 27 MMOL/L — SIGNIFICANT CHANGE UP (ref 22–31)
CREAT SERPL-MCNC: 0.77 MG/DL — SIGNIFICANT CHANGE UP (ref 0.5–1.3)
CULTURE RESULTS: SIGNIFICANT CHANGE UP
CULTURE RESULTS: SIGNIFICANT CHANGE UP
GLUCOSE SERPL-MCNC: 90 MG/DL — SIGNIFICANT CHANGE UP (ref 70–99)
HCT VFR BLD CALC: 27.9 % — LOW (ref 39–50)
HGB BLD-MCNC: 9.1 G/DL — LOW (ref 13–17)
INR BLD: 1.57 RATIO — HIGH (ref 0.88–1.16)
MCHC RBC-ENTMCNC: 29.3 PG — SIGNIFICANT CHANGE UP (ref 27–34)
MCHC RBC-ENTMCNC: 32.5 GM/DL — SIGNIFICANT CHANGE UP (ref 32–36)
MCV RBC AUTO: 90.1 FL — SIGNIFICANT CHANGE UP (ref 80–100)
PLATELET # BLD AUTO: 265 K/UL — SIGNIFICANT CHANGE UP (ref 150–400)
POTASSIUM SERPL-MCNC: 4.1 MMOL/L — SIGNIFICANT CHANGE UP (ref 3.5–5.3)
POTASSIUM SERPL-SCNC: 4.1 MMOL/L — SIGNIFICANT CHANGE UP (ref 3.5–5.3)
PROTHROM AB SERPL-ACNC: 17.3 SEC — HIGH (ref 9.8–12.7)
RBC # BLD: 3.1 M/UL — LOW (ref 4.2–5.8)
RBC # FLD: 12.3 % — SIGNIFICANT CHANGE UP (ref 10.3–14.5)
SODIUM SERPL-SCNC: 137 MMOL/L — SIGNIFICANT CHANGE UP (ref 135–145)
SPECIMEN SOURCE: SIGNIFICANT CHANGE UP
SPECIMEN SOURCE: SIGNIFICANT CHANGE UP
WBC # BLD: 7.2 K/UL — SIGNIFICANT CHANGE UP (ref 3.8–10.5)
WBC # FLD AUTO: 7.2 K/UL — SIGNIFICANT CHANGE UP (ref 3.8–10.5)

## 2018-04-15 PROCEDURE — 71045 X-RAY EXAM CHEST 1 VIEW: CPT | Mod: 26

## 2018-04-15 RX ORDER — ACETAMINOPHEN 500 MG
650 TABLET ORAL EVERY 6 HOURS
Qty: 0 | Refills: 0 | Status: DISCONTINUED | OUTPATIENT
Start: 2018-04-15 | End: 2018-04-19

## 2018-04-15 RX ADMIN — ENOXAPARIN SODIUM 40 MILLIGRAM(S): 100 INJECTION SUBCUTANEOUS at 05:31

## 2018-04-15 RX ADMIN — ATORVASTATIN CALCIUM 20 MILLIGRAM(S): 80 TABLET, FILM COATED ORAL at 21:45

## 2018-04-15 RX ADMIN — BUDESONIDE AND FORMOTEROL FUMARATE DIHYDRATE 2 PUFF(S): 160; 4.5 AEROSOL RESPIRATORY (INHALATION) at 11:37

## 2018-04-15 RX ADMIN — Medication 650 MILLIGRAM(S): at 11:06

## 2018-04-15 RX ADMIN — Medication 650 MILLIGRAM(S): at 02:30

## 2018-04-15 RX ADMIN — SODIUM CHLORIDE 50 MILLILITER(S): 9 INJECTION, SOLUTION INTRAVENOUS at 05:32

## 2018-04-15 RX ADMIN — Medication 650 MILLIGRAM(S): at 22:39

## 2018-04-15 RX ADMIN — Medication 650 MILLIGRAM(S): at 21:46

## 2018-04-15 RX ADMIN — Medication 81 MILLIGRAM(S): at 11:37

## 2018-04-15 RX ADMIN — Medication 650 MILLIGRAM(S): at 01:41

## 2018-04-15 RX ADMIN — BUDESONIDE AND FORMOTEROL FUMARATE DIHYDRATE 2 PUFF(S): 160; 4.5 AEROSOL RESPIRATORY (INHALATION) at 22:30

## 2018-04-15 RX ADMIN — BUDESONIDE AND FORMOTEROL FUMARATE DIHYDRATE 2 PUFF(S): 160; 4.5 AEROSOL RESPIRATORY (INHALATION) at 00:00

## 2018-04-15 RX ADMIN — Medication 650 MILLIGRAM(S): at 10:36

## 2018-04-15 RX ADMIN — FINASTERIDE 5 MILLIGRAM(S): 5 TABLET, FILM COATED ORAL at 11:37

## 2018-04-15 RX ADMIN — SODIUM CHLORIDE 50 MILLILITER(S): 9 INJECTION, SOLUTION INTRAVENOUS at 18:12

## 2018-04-15 RX ADMIN — TAMSULOSIN HYDROCHLORIDE 0.4 MILLIGRAM(S): 0.4 CAPSULE ORAL at 11:37

## 2018-04-15 NOTE — PROGRESS NOTE ADULT - SUBJECTIVE AND OBJECTIVE BOX
Patient is a 67y old  Male who presents with a chief complaint of fever and cough (11 Apr 2018 10:02)      INTERVAL HPI/OVERNIGHT EVENTS:  T(C): 36.8 (04-15-18 @ 14:39), Max: 37.2 (04-14-18 @ 20:42)  HR: 74 (04-15-18 @ 14:39) (63 - 74)  BP: 110/69 (04-15-18 @ 14:39) (100/63 - 110/69)  RR: 17 (04-15-18 @ 14:39) (16 - 17)  SpO2: 96% (04-15-18 @ 14:39) (96% - 96%)  Wt(kg): --  I&O's Summary    14 Apr 2018 07:01  -  15 Apr 2018 07:00  --------------------------------------------------------  IN: 2080 mL / OUT: 1840 mL / NET: 240 mL    15 Apr 2018 07:01  -  15 Apr 2018 17:13  --------------------------------------------------------  IN: 720 mL / OUT: 1150 mL / NET: -430 mL        LABS:                        9.1    7.2   )-----------( 265      ( 15 Apr 2018 07:14 )             27.9     04-15    137  |  100  |  9   ----------------------------<  90  4.1   |  27  |  0.77    Ca    8.8      15 Apr 2018 07:14  Phos  3.8     04-13  Mg     1.8     04-13    TPro  6.5  /  Alb  3.3  /  TBili  0.6  /  DBili  x   /  AST  15  /  ALT  13  /  AlkPhos  43  04-14    PT/INR - ( 15 Apr 2018 07:15 )   PT: 17.3 sec;   INR: 1.57 ratio         PTT - ( 14 Apr 2018 01:03 )  PTT:31.0 sec    CAPILLARY BLOOD GLUCOSE                MEDICATIONS  (STANDING):  aspirin  chewable 81 milliGRAM(s) Oral daily  atorvastatin 20 milliGRAM(s) Oral at bedtime  buDESOnide  80 MICROgram(s)/formoterol 4.5 MICROgram(s) Inhaler 2 Puff(s) Inhalation two times a day  enoxaparin Injectable 40 milliGRAM(s) SubCutaneous every 24 hours  finasteride 5 milliGRAM(s) Oral daily  lactated ringers. 1000 milliLiter(s) (50 mL/Hr) IV Continuous <Continuous>  tamsulosin 0.4 milliGRAM(s) Oral daily    MEDICATIONS  (PRN):  acetaminophen   Tablet. 650 milliGRAM(s) Oral every 6 hours PRN Mild Pain (1 - 3)  HYDROmorphone  Injectable 0.5 milliGRAM(s) IV Push every 4 hours PRN Moderate Pain (4 - 6)          PHYSICAL EXAM:  GENERAL: NAD, well-groomed, well-developed  HEAD:  Atraumatic, Normocephalic  CHEST/LUNG: Clear to percussion bilaterally; No rales, rhonchi, wheezing, or rubs  HEART: Regular rate and rhythm; No murmurs, rubs, or gallops  ABDOMEN: Soft, Nontender, Nondistended; Bowel sounds present  EXTREMITIES:  2+ Peripheral Pulses, No clubbing, cyanosis, or edema  LYMPH: No lymphadenopathy noted  SKIN: No rashes or lesions    Care Discussed with Consultants/Other Providers [ +] YES  [ ] NO

## 2018-04-15 NOTE — PROGRESS NOTE ADULT - PROBLEM SELECTOR PLAN 1
Continue Chest tubes to wall suction.  CXR stable this am.  Continue current pain regimen and adjust if needed  INcentive spirometry to bed side

## 2018-04-15 NOTE — PROGRESS NOTE ADULT - SUBJECTIVE AND OBJECTIVE BOX
Subjective: Pt states" " Denies any CP, SOB, palpitations. No acute events overnight.    Vital Signs:sr  Vital Signs Last 24 Hrs  T(C): 36.8 (04-15-18 @ 04:46), Max: 37.4 (04-14-18 @ 15:00)  T(F): 98.3 (04-15-18 @ 04:46), Max: 99.4 (04-14-18 @ 16:05)  HR: 67 (04-15-18 @ 04:46) (63 - 86)  BP: 102/61 (04-15-18 @ 04:46) (100/63 - 119/66)  RR: 16 (04-15-18 @ 04:46) (16 - 18)  SpO2: 96% (04-15-18 @ 04:46) (94% - 97%) on (O2)        Relevant labs, radiology and Medications reviewed                        9.1    7.2   )-----------( 265      ( 15 Apr 2018 07:14 )             27.9     04-15    137  |  100  |  9   ----------------------------<  90  4.1   |  27  |  0.77    Ca    8.8      15 Apr 2018 07:14  Phos  3.8     04-13  Mg     1.8     04-13    TPro  6.5  /  Alb  3.3  /  TBili  0.6  /  DBili  x   /  AST  15  /  ALT  13  /  AlkPhos  43  04-14    PT/INR - ( 15 Apr 2018 07:15 )   PT: 17.3 sec;   INR: 1.57 ratio         PTT - ( 14 Apr 2018 01:03 )  PTT:31.0 sec  MEDICATIONS  (STANDING):  aspirin  chewable 81 milliGRAM(s) Oral daily  atorvastatin 20 milliGRAM(s) Oral at bedtime  buDESOnide  80 MICROgram(s)/formoterol 4.5 MICROgram(s) Inhaler 2 Puff(s) Inhalation two times a day  enoxaparin Injectable 40 milliGRAM(s) SubCutaneous every 24 hours  finasteride 5 milliGRAM(s) Oral daily  lactated ringers. 1000 milliLiter(s) (50 mL/Hr) IV Continuous <Continuous>  tamsulosin 0.4 milliGRAM(s) Oral daily    MEDICATIONS  (PRN):  acetaminophen   Tablet. 650 milliGRAM(s) Oral every 6 hours PRN Mild Pain (1 - 3)  HYDROmorphone  Injectable 0.5 milliGRAM(s) IV Push every 4 hours PRN Moderate Pain (4 - 6)      I&O's Summary    14 Apr 2018 07:01  -  15 Apr 2018 07:00  --------------------------------------------------------  IN: 2080 mL / OUT: 1840 mL / NET: 240 mL    15 Apr 2018 07:01  -  15 Apr 2018 14:13  --------------------------------------------------------  IN: 360 mL / OUT: 400 mL / NET: -40 mL        IMAGING    CXR: reviewed    CT Chest:    PAST MEDICAL & SURGICAL HISTORY:  Kidney stones  Hyperlipidemia  Hypertension  CAD (coronary artery disease)  H/O mitral valve replacement       Physical Exam:  Neurology: A&Ox3, nonfocal, IQBAL x 4, NAD  Respiratory: B/L BS CTA, diminished at bases, No wheezing, rales, rhonchi  CV: RRR, S1S2

## 2018-04-15 NOTE — PROGRESS NOTE ADULT - ASSESSMENT
68 yo male presenting with 2 day hx of acutely worsening cough and fever.  fever high of 101.3 this morning.  productive cough which has been going on for a few days. yellowish sputum.  did not take anything for his symptoms     Problem/Plan - 1:  ·  Problem: Acute febrile illness.  Plan: fredrick viral  ID fu appreciated  will monitor.     Problem/Plan - 2:  ·  Problem: Pleural effusion.  Plan: pulmonary fu  sp VATS   chest tube management as per surgery

## 2018-04-15 NOTE — PROGRESS NOTE ADULT - ASSESSMENT
67M with PMHx of Afib on Coumadin, HTN, CAD s/p CABG, MVR, admitted for progressive BROOKS s/p Rt thoracentesis 2 weeks ago. Pt s/p Chest CT today with reoccurrence of small right pleural effusion without enhancement to suggest empyema and adjacent partial right lower lobe atelectasis. Underwent  Right lung with extensive loculation and hemothorax. Decortication was performed. Pleural biopsies taken.   4/14: Stable for transfer to floor-2 DSU  4/15: Awaiting biopsy results from OR specimen. Continue CT to wall suction today. Cxr in am

## 2018-04-16 LAB
ANION GAP SERPL CALC-SCNC: 11 MMOL/L — SIGNIFICANT CHANGE UP (ref 5–17)
BUN SERPL-MCNC: 8 MG/DL — SIGNIFICANT CHANGE UP (ref 7–23)
CALCIUM SERPL-MCNC: 9.2 MG/DL — SIGNIFICANT CHANGE UP (ref 8.4–10.5)
CHLORIDE SERPL-SCNC: 102 MMOL/L — SIGNIFICANT CHANGE UP (ref 96–108)
CO2 SERPL-SCNC: 27 MMOL/L — SIGNIFICANT CHANGE UP (ref 22–31)
CREAT SERPL-MCNC: 0.71 MG/DL — SIGNIFICANT CHANGE UP (ref 0.5–1.3)
GLUCOSE SERPL-MCNC: 101 MG/DL — HIGH (ref 70–99)
HCT VFR BLD CALC: 28.8 % — LOW (ref 39–50)
HGB BLD-MCNC: 9.2 G/DL — LOW (ref 13–17)
MCHC RBC-ENTMCNC: 28.3 PG — SIGNIFICANT CHANGE UP (ref 27–34)
MCHC RBC-ENTMCNC: 31.9 GM/DL — LOW (ref 32–36)
MCV RBC AUTO: 88.6 FL — SIGNIFICANT CHANGE UP (ref 80–100)
PLATELET # BLD AUTO: 278 K/UL — SIGNIFICANT CHANGE UP (ref 150–400)
POTASSIUM SERPL-MCNC: 3.9 MMOL/L — SIGNIFICANT CHANGE UP (ref 3.5–5.3)
POTASSIUM SERPL-SCNC: 3.9 MMOL/L — SIGNIFICANT CHANGE UP (ref 3.5–5.3)
RBC # BLD: 3.25 M/UL — LOW (ref 4.2–5.8)
RBC # FLD: 13.4 % — SIGNIFICANT CHANGE UP (ref 10.3–14.5)
SODIUM SERPL-SCNC: 140 MMOL/L — SIGNIFICANT CHANGE UP (ref 135–145)
WBC # BLD: 6.93 K/UL — SIGNIFICANT CHANGE UP (ref 3.8–10.5)
WBC # FLD AUTO: 6.93 K/UL — SIGNIFICANT CHANGE UP (ref 3.8–10.5)

## 2018-04-16 PROCEDURE — 71045 X-RAY EXAM CHEST 1 VIEW: CPT | Mod: 26

## 2018-04-16 PROCEDURE — 99232 SBSQ HOSP IP/OBS MODERATE 35: CPT

## 2018-04-16 RX ADMIN — Medication 650 MILLIGRAM(S): at 06:25

## 2018-04-16 RX ADMIN — SODIUM CHLORIDE 50 MILLILITER(S): 9 INJECTION, SOLUTION INTRAVENOUS at 01:21

## 2018-04-16 RX ADMIN — BUDESONIDE AND FORMOTEROL FUMARATE DIHYDRATE 2 PUFF(S): 160; 4.5 AEROSOL RESPIRATORY (INHALATION) at 22:03

## 2018-04-16 RX ADMIN — Medication 650 MILLIGRAM(S): at 22:00

## 2018-04-16 RX ADMIN — Medication 650 MILLIGRAM(S): at 07:06

## 2018-04-16 RX ADMIN — TAMSULOSIN HYDROCHLORIDE 0.4 MILLIGRAM(S): 0.4 CAPSULE ORAL at 11:32

## 2018-04-16 RX ADMIN — ATORVASTATIN CALCIUM 20 MILLIGRAM(S): 80 TABLET, FILM COATED ORAL at 21:13

## 2018-04-16 RX ADMIN — Medication 650 MILLIGRAM(S): at 21:14

## 2018-04-16 RX ADMIN — FINASTERIDE 5 MILLIGRAM(S): 5 TABLET, FILM COATED ORAL at 11:32

## 2018-04-16 RX ADMIN — Medication 81 MILLIGRAM(S): at 11:32

## 2018-04-16 RX ADMIN — BUDESONIDE AND FORMOTEROL FUMARATE DIHYDRATE 2 PUFF(S): 160; 4.5 AEROSOL RESPIRATORY (INHALATION) at 11:32

## 2018-04-16 RX ADMIN — ENOXAPARIN SODIUM 40 MILLIGRAM(S): 100 INJECTION SUBCUTANEOUS at 05:22

## 2018-04-16 NOTE — PROGRESS NOTE ADULT - PROBLEM SELECTOR PLAN 1
s/p thorocentesis by--radiology   prior pleural -cyto-negative prior  CTS Sue to take to OR for VATS bx 4/13 s/p thorocentesis by--radiology   prior pleural -cyto-negative prior  CTS Sue to taken to OR for VATS bx 4/13--await path

## 2018-04-16 NOTE — PROGRESS NOTE ADULT - PROBLEM SELECTOR PLAN 1
Continue Chest tubes to water seal today- strict I & Os  Check CXR in AM.   Will DC chest tube in AM if CXR stable and with minimal drainage.  Cough and deep breathe, Incentive Spirometry Q1h, Chest PT.  OOB to chair, Ambulate 4x daily  Continue with management per primary team, will follow.

## 2018-04-16 NOTE — PROGRESS NOTE ADULT - SUBJECTIVE AND OBJECTIVE BOX
Subjective: Pt states "I feel fine" denies any CP, SOB, N/V and palpitations. No acute events overnight.     Vital Signs Last 24 Hrs  T(C): 37 (18 @ 04:55), Max: 37 (18 @ 04:55)  T(F): 98.6 (18 @ 04:55), Max: 98.6 (18 @ 04:55)  HR: 77 (18 @ 04:55) (73 - 77)  BP: 124/82 (18 @ 04:55) (110/69 - 124/82)  RR: 20 (18 @ 04:55) (17 - 20)  SpO2: 94% (18 @ 04:55) (92% - 96%)         18 @ 07:01  -  18 @ 11:59  --------------------------------------------------------  IN: 360 mL / OUT: 250 mL / NET: 110 mL      Daily Weight in k.9 (2018 07:51)    Relevant labs, radiology and Medications reviewed                        9.2    6.93  )-----------( 278      ( 2018 07:34 )             28.8     140  |  102  |  8   ----------------------------<  101<H>  3.9   |  27  |  0.71         PHYSICAL EXAM  Neurology: A&Ox3, NAD, no gross deficits  CV : RRR+S1S2  Lungs: Respirations non-labored, B/L BS diminished at bases R>L  +2 Right pleural CT to LWS with serosanguinous drainage, no air leak  Abdomen: Soft, NT/ND, +BSx4Q, +BM  : Voiding without difficulty  Extremities: B/L LE no edema, negative calf tenderness, +PP           MEDICATIONS  acetaminophen   Tablet. 650 milliGRAM(s) Oral every 6 hours PRN  aspirin  chewable 81 milliGRAM(s) Oral daily  atorvastatin 20 milliGRAM(s) Oral at bedtime  buDESOnide  80 MICROgram(s)/formoterol 4.5 MICROgram(s) Inhaler 2 Puff(s) Inhalation two times a day  enoxaparin Injectable 40 milliGRAM(s) SubCutaneous every 24 hours  finasteride 5 milliGRAM(s) Oral daily  HYDROmorphone  Injectable 0.5 milliGRAM(s) IV Push every 4 hours PRN  lactated ringers. 1000 milliLiter(s) IV Continuous <Continuous>  tamsulosin 0.4 milliGRAM(s) Oral daily      Discussed with thoracic surgery attending, Dr. Rogers.

## 2018-04-16 NOTE — PROGRESS NOTE ADULT - ASSESSMENT
66 yo male presenting with 2 day hx of acutely worsening cough and fever.  fever high of 101.3 this morning.  productive cough which has been going on for a few days. yellowish sputum.  did not take anything for his symptoms      Acute febrile illness.  Plan: fredrick viral  ID fu appreciated  will monitor.      Pleural effusion.  Plan: pulmonary fu  sp VATS   chest tube management as per surgery    A fib Plan Pt refusing coumadin for now  EP consult

## 2018-04-16 NOTE — PROGRESS NOTE ADULT - SUBJECTIVE AND OBJECTIVE BOX
Infectious Diseases progress note:    Subjective:  Events noted.  Pt s/p VATS.  chest tubes in place.  Has occasional dry cough.  No fever or leukocytosis    ROS:  CONSTITUTIONAL:  No fever, chills, rigors  CARDIOVASCULAR:  No chest pain or palpitations  RESPIRATORY:   No SOB, cough, dyspnea on exertion.  No wheezing  GASTROINTESTINAL:  No abd pain, N/V, diarrhea/constipation  EXTREMITIES:  No swelling or joint pain  GENITOURINARY:  No burning on urination, increased frequency or urgency.  No flank pain  NEUROLOGIC:  No HA, visual disturbances  SKIN: No rashes    Allergies    penicillin (Flushing)    Intolerances        ANTIBIOTICS/RELEVANT:  antimicrobials    immunologic:    OTHER:  acetaminophen   Tablet. 650 milliGRAM(s) Oral every 6 hours PRN  aspirin  chewable 81 milliGRAM(s) Oral daily  atorvastatin 20 milliGRAM(s) Oral at bedtime  buDESOnide  80 MICROgram(s)/formoterol 4.5 MICROgram(s) Inhaler 2 Puff(s) Inhalation two times a day  enoxaparin Injectable 40 milliGRAM(s) SubCutaneous every 24 hours  finasteride 5 milliGRAM(s) Oral daily  HYDROmorphone  Injectable 0.5 milliGRAM(s) IV Push every 4 hours PRN  tamsulosin 0.4 milliGRAM(s) Oral daily      Objective:  Vital Signs Last 24 Hrs  T(C): 37 (16 Apr 2018 04:55), Max: 37 (16 Apr 2018 04:55)  T(F): 98.6 (16 Apr 2018 04:55), Max: 98.6 (16 Apr 2018 04:55)  HR: 77 (16 Apr 2018 04:55) (73 - 77)  BP: 124/82 (16 Apr 2018 04:55) (110/69 - 124/82)  BP(mean): --  RR: 20 (16 Apr 2018 04:55) (17 - 20)  SpO2: 94% (16 Apr 2018 04:55) (92% - 96%)    PHYSICAL EXAM:  Constitutional:NAD  Eyes:SIMA, EOMI  Ear/Nose/Throat: no thrush, mucositis.  Moist mucous membranes	  Neck:no JVD, no lymphadenopathy, supple  Respiratory: CTA mitzi, R chest tubes x 2  Cardiovascular: S1S2 RRR, no murmurs  Gastrointestinal:soft, nontender,  nondistended (+) BS  Extremities:no e/e/c  Skin:  no rashes, open wounds or ulcerations        LABS:                        9.2    6.93  )-----------( 278      ( 16 Apr 2018 07:34 )             28.8     04-16    140  |  102  |  8   ----------------------------<  101<H>  3.9   |  27  |  0.71    Ca    9.2      16 Apr 2018 05:45      PT/INR - ( 15 Apr 2018 07:15 )   PT: 17.3 sec;   INR: 1.57 ratio             Rapid RVP Result: Detected          MICROBIOLOGY:    Culture - Tissue with Gram Stain (04.14.18 @ 01:41)    Gram Stain:   No polymorphonuclear cells seen per low power field  No organisms seen per oil power field    Specimen Source: .Tissue plueral peel culture    Culture Results:   No growth to date.    Culture - Blood (04.10.18 @ 13:43)    Specimen Source: .Blood Blood-Venous    Culture Results:   No growth at 5 days.    Culture - Blood (04.10.18 @ 13:43)    Specimen Source: .Blood Blood-Peripheral    Culture Results:   No growth at 5 days.    Culture - Acid Fast - Body Fluid w/Smear (03.28.18 @ 20:47)    Specimen Source: .Body Fluid Peritoneal Fluid    Acid Fast Bacilli Smear:   No acid fast bacilli seen by fluorochrome stain    Culture Results:   No growth at 1 week.          RADIOLOGY & ADDITIONAL STUDIES:    < from: Xray Chest 1 View- PORTABLE-Routine (04.15.18 @ 10:24) >  FINDINGS:     Cardiac silhouette not well evaluated. Right lung postsurgical changes   with 2 right-sided chest tubes. No pneumothorax. Left lung is clear.   Sternotomy wires.    IMPRESSION:   No change from the prior study.    < end of copied text >

## 2018-04-16 NOTE — PROGRESS NOTE ADULT - ASSESSMENT
67 M with a PMH of HTN, HLD, CAD, diastolic dysfunction,  s/p MV annuloplasty, A fib on coumadin, s/p recent hospitalization for SOB with draiange of R pleural effusion, now returning with enterorhino virus infection and SOB with a persistent loculated R pleural effusion mildly increased in size.     Recurring R pleural Effusion:  - Pt seen by thoracic surgery, plan for VATs with biopsy on Thursday  - Currently saturating well on RA  - Would suggest Duonebs Q6H  - Can start Symbicort BID  - Chest PT, incentive spirometry/ acapella Q6H  -AC held for OR today 4/13 67 M with a PMH of HTN, HLD, CAD, diastolic dysfunction,  s/p MV annuloplasty, A fib on coumadin, s/p recent hospitalization for SOB with draiange of R pleural effusion, now returning with enterorhino virus infection and SOB with a persistent loculated R pleural effusion mildly increased in size.     Recurring R pleural Effusion:  - Pt seen by thoracic surgery, had VATs 4/13  - Currently saturating well on RA  -  Duonebs Q6H  -  Symbicort BID  - Chest PT, incentive spirometry/ acapella Q6H  -AC held for OR today 4/13

## 2018-04-16 NOTE — PROGRESS NOTE ADULT - ATTENDING COMMENTS
as above-  s/p prior non-dx  thoro -- re-admit w/recurrent effusion for VATS  (DDX-asbestos related, lung CA, empyema, lymphoma)  VATS-4/13--INR adequate to go forward; optimal pain control post op d/w pt.  Bronchodilator rx as above    Chirag Flynn MD-Pulmonary   [phone] as above-  s/p prior non-dx  thoro -- re-admit w/recurrent effusion for VATS  (DDX-asbestos related, lung CA, empyema, lymphoma)  VATS-4/13--await path;     ; optimal pain control post op d/w pt. CT as per CTS.  Bronchodilator rx as above    Chirag Flynn MD-Pulmonary   [phone]

## 2018-04-16 NOTE — PROGRESS NOTE ADULT - ASSESSMENT
67M with PMHx of Afib on Coumadin, HTN, CAD s/p CABG, MVR, admitted for progressive BROOKS s/p Rt thoracentesis 2 weeks ago. Pt s/p Chest CT today with reoccurrence of small right pleural effusion without enhancement to suggest empyema and adjacent partial right lower lobe atelectasis. Right lung with extensive loculation and hemothorax. Underwent 4/13 Decortication was performed. Pleural biopsies taken.   4/14: Stable for transfer to floor-2 DSU  4/15: Awaiting biopsy results from OR specimen. Continue CT to wall suction today. Cxr in am  4/16 Chest tubes placed to water seal today. Output 20ml/24h in both.

## 2018-04-16 NOTE — PROGRESS NOTE ADULT - ASSESSMENT
67 M with a PMH of HTN, HLD, CAD, diastolic dysfunction,  s/p MV annuloplasty, A fib on coumadin, s/p recent hospital admission from 3/25-3/29/18 during which he underwent a R thoracentesis with removal of 1 L. The pleural fluid studies were consistent with exudative fluid , cytopath was negative for malignancy, cultures negative.  He now returns with complaints of fever of 101 F for 1-2 days along with a cough productive of clear and occasionally yellow sputum as well as worsening shortness of breath. He reports that the shortness of breath has been gradually worsening for the past 1.5 week. His exercise tolerance has been increasingly limited due to the SOB. He denies any chest pain, nausea, vomiting, diarrhea dysuria or hematuria but does report mild intermittent wheezing. An RVP was positive for entero rhino virus and a Ct chest showed increase in a small to moderate loculated R pleural effusion.     ER vitals:  T 98.4, P 88, /81, RR 26, 97% RA.   Pt was given a dose of vanco/levaquin in the ER.  Pt seen by thoracic surgery and is awaiting R VATS with pleural drainage and biopsy.     Problem/Plan - 1:    ·	Upper respiratory viral infection    - RVP (+) entero/rhinovirus.  No role for antivirals, cont supportive care including duonebs, cont symbicort.  Wheezing improved    - Do not suspect superimposed bacterial pneumonia. Will monitor off abx for now.  Pt without fever or leukocytosis.  No purulent phlegm.       Problem/Plan - 2:    ·	Right pleural effusion    - Increased from last admission.  s/p VATS, CTS following    - f/u pleural fluid analysis, and culture data (bacterial cultures negative).  Prior AFB cultures from pleural fluid analysis negative.  Pt had negative PPD testing at PCPs office 2 weeks prior to admission.  Denies any history of TB exposure.      Will follow,    Josselin Echeverriai    [phone]

## 2018-04-16 NOTE — PROGRESS NOTE ADULT - SUBJECTIVE AND OBJECTIVE BOX
CHIEF COMPLAINT:    Interval Events:    REVIEW OF SYSTEMS:  Constitutional: No fevers or chills. No weight loss. No fatigue or generalized malaise.  Eyes: No itching or discharge from the eyes  ENT: No ear pain. No ear discharge. No nasal congestion. No post nasal drip. No epistaxis. No throat pain. No sore throat. No difficulty swallowing.   CV: No chest pain. No palpitations. No lightheadedness or dizziness.   Resp: No dyspnea at rest. No dyspnea on exertion. No orthopnea. No wheezing. No cough. No stridor. No sputum production. No chest pain with respiration.  GI: No nausea. No vomiting. No diarrhea.  MSK: No joint pain or pain in any extremities  Integumentary: No skin lesions. No pedal edema.  Neurological: No gross motor weakness. No sensory changes.  [ ] All other systems negative  [ ] Unable to assess ROS because ________    OBJECTIVE:  ICU Vital Signs Last 24 Hrs  T(C): 36.8 (15 Apr 2018 21:51), Max: 36.8 (15 Apr 2018 14:39)  T(F): 98.2 (15 Apr 2018 21:51), Max: 98.2 (15 Apr 2018 14:39)  HR: 73 (15 Apr 2018 21:51) (73 - 74)  BP: 119/72 (15 Apr 2018 21:51) (110/69 - 119/72)  BP(mean): --  ABP: --  ABP(mean): --  RR: 20 (15 Apr 2018 21:51) (17 - 20)  SpO2: 92% (15 Apr 2018 21:51) (92% - 96%)        04-14 @ 07:01  -  04-15 @ 07:00  --------------------------------------------------------  IN: 2080 mL / OUT: 1840 mL / NET: 240 mL    04-15 @ 07:01  - 04-16 @ 04:48  --------------------------------------------------------  IN: 1680 mL / OUT: 2865 mL / NET: -1185 mL      CAPILLARY BLOOD GLUCOSE          PHYSICAL EXAM:  General: Awake, alert, oriented X 3.   HEENT: Atraumatic, normocephalic.                 Mallampatti Grade                 No nasal congestion.                No tonsillar or pharyngeal exudates.  Lymph Nodes: No palpable lymphadenopathy  Neck: No JVD. No carotid bruit.   Respiratory: Normal chest expansion                         Normal percussion                         Normal and equal air entry                         No wheeze, rhonchi or rales.  Cardiovascular: S1 S2 normal. No murmurs, rubs or gallops.   Abdomen: Soft, non-tender, non-distended. No organomegaly.  Extremities: Warm to touch. Peripheral pulse palpable. No pedal edema.   Skin: No rashes or skin lesions  Neurological: Motor and sensory examination equal and normal in all four extremities.  Psychiatry: Appropriate mood and affect.    HOSPITAL MEDICATIONS:  MEDICATIONS  (STANDING):  aspirin  chewable 81 milliGRAM(s) Oral daily  atorvastatin 20 milliGRAM(s) Oral at bedtime  buDESOnide  80 MICROgram(s)/formoterol 4.5 MICROgram(s) Inhaler 2 Puff(s) Inhalation two times a day  enoxaparin Injectable 40 milliGRAM(s) SubCutaneous every 24 hours  finasteride 5 milliGRAM(s) Oral daily  lactated ringers. 1000 milliLiter(s) (50 mL/Hr) IV Continuous <Continuous>  tamsulosin 0.4 milliGRAM(s) Oral daily    MEDICATIONS  (PRN):  acetaminophen   Tablet. 650 milliGRAM(s) Oral every 6 hours PRN Mild Pain (1 - 3)  HYDROmorphone  Injectable 0.5 milliGRAM(s) IV Push every 4 hours PRN Moderate Pain (4 - 6)      LABS:                        9.1    7.2   )-----------( 265      ( 15 Apr 2018 07:14 )             27.9     04-15    137  |  100  |  9   ----------------------------<  90  4.1   |  27  |  0.77    Ca    8.8      15 Apr 2018 07:14      PT/INR - ( 15 Apr 2018 07:15 )   PT: 17.3 sec;   INR: 1.57 ratio                   MICROBIOLOGY:     RADIOLOGY:  [ ] Reviewed and interpreted by me    Point of Care Ultrasound Findings:    PFT:    EKG: CHIEF COMPLAINT: minimal pain--good breathing-less cough    Interval Events: s/p VATS-decorticated    REVIEW OF SYSTEMS:  Constitutional: No fevers or chills. No weight loss. No fatigue or generalized malaise.  Eyes: No itching or discharge from the eyes  ENT: No ear pain. No ear discharge. No nasal congestion. No post nasal drip. No epistaxis. No throat pain. No sore throat. No difficulty swallowing.   CV: No chest pain. No palpitations. No lightheadedness or dizziness.   Resp: No dyspnea at rest. + dyspnea on exertion. No orthopnea. No wheezing. No cough. No stridor. No sputum production. No chest pain with respiration.  GI: No nausea. No vomiting. No diarrhea.  MSK: No joint pain or pain in any extremities  Integumentary: No skin lesions. No pedal edema.  Neurological: No gross motor weakness. No sensory changes.  [+ ] All other systems negative  [ ] Unable to assess ROS because ________    OBJECTIVE:  ICU Vital Signs Last 24 Hrs  T(C): 36.8 (15 Apr 2018 21:51), Max: 36.8 (15 Apr 2018 14:39)  T(F): 98.2 (15 Apr 2018 21:51), Max: 98.2 (15 Apr 2018 14:39)  HR: 73 (15 Apr 2018 21:51) (73 - 74)  BP: 119/72 (15 Apr 2018 21:51) (110/69 - 119/72)  BP(mean): --  ABP: --  ABP(mean): --  RR: 20 (15 Apr 2018 21:51) (17 - 20)  SpO2: 92% (15 Apr 2018 21:51) (92% - 96%)        04-14 @ 07:01  -  04-15 @ 07:00  --------------------------------------------------------  IN: 2080 mL / OUT: 1840 mL / NET: 240 mL    04-15 @ 07:01 - 04-16 @ 04:48  --------------------------------------------------------  IN: 1680 mL / OUT: 2865 mL / NET: -1185 mL      CAPILLARY BLOOD GLUCOSE          PHYSICAL EXAM: NAD in bed  General: Awake, alert, oriented X 3.   HEENT: Atraumatic, normocephalic.                 Mallampatti Grade 3                No nasal congestion.                No tonsillar or pharyngeal exudates.  Lymph Nodes: No palpable lymphadenopathy  Neck: No JVD. No carotid bruit.   Respiratory: abnormal chest expansion-right base                         Normal percussion                         Normal and equal air entry                         No wheeze, rhonchi or rales.  Cardiovascular: S1 S2 normal. No murmurs, rubs or gallops.   Abdomen: Soft, non-tender, non-distended. No organomegaly.  Extremities: Warm to touch. Peripheral pulse palpable. No pedal edema.   Skin: No rashes or skin lesions  Neurological: Motor and sensory examination equal and normal in all four extremities.  Psychiatry: Appropriate mood and affect.    HOSPITAL MEDICATIONS:  MEDICATIONS  (STANDING):  aspirin  chewable 81 milliGRAM(s) Oral daily  atorvastatin 20 milliGRAM(s) Oral at bedtime  buDESOnide  80 MICROgram(s)/formoterol 4.5 MICROgram(s) Inhaler 2 Puff(s) Inhalation two times a day  enoxaparin Injectable 40 milliGRAM(s) SubCutaneous every 24 hours  finasteride 5 milliGRAM(s) Oral daily  lactated ringers. 1000 milliLiter(s) (50 mL/Hr) IV Continuous <Continuous>  tamsulosin 0.4 milliGRAM(s) Oral daily    MEDICATIONS  (PRN):  acetaminophen   Tablet. 650 milliGRAM(s) Oral every 6 hours PRN Mild Pain (1 - 3)  HYDROmorphone  Injectable 0.5 milliGRAM(s) IV Push every 4 hours PRN Moderate Pain (4 - 6)      LABS:                        9.1    7.2   )-----------( 265      ( 15 Apr 2018 07:14 )             27.9     04-15    137  |  100  |  9   ----------------------------<  90  4.1   |  27  |  0.77    Ca    8.8      15 Apr 2018 07:14      PT/INR - ( 15 Apr 2018 07:15 )   PT: 17.3 sec;   INR: 1.57 ratio                   MICROBIOLOGY:     RADIOLOGY:  [ ] Reviewed and interpreted by me    Point of Care Ultrasound Findings:    PFT:    EKG:

## 2018-04-16 NOTE — PROGRESS NOTE ADULT - SUBJECTIVE AND OBJECTIVE BOX
Patient is a 67y old  Male who presents with a chief complaint of fever and cough (11 Apr 2018 10:02)      INTERVAL HPI/OVERNIGHT EVENTS:  T(C): 36.9 (04-16-18 @ 14:20), Max: 37 (04-16-18 @ 04:55)  HR: 76 (04-16-18 @ 14:20) (73 - 77)  BP: 123/68 (04-16-18 @ 14:20) (119/72 - 124/82)  RR: 18 (04-16-18 @ 14:20) (18 - 20)  SpO2: 94% (04-16-18 @ 14:20) (92% - 94%)  Wt(kg): --  I&O's Summary    15 Apr 2018 07:01  -  16 Apr 2018 07:00  --------------------------------------------------------  IN: 2280 mL / OUT: 3465 mL / NET: -1185 mL    16 Apr 2018 07:01  -  16 Apr 2018 14:57  --------------------------------------------------------  IN: 720 mL / OUT: 650 mL / NET: 70 mL        LABS:                        9.2    6.93  )-----------( 278      ( 16 Apr 2018 07:34 )             28.8     04-16    140  |  102  |  8   ----------------------------<  101<H>  3.9   |  27  |  0.71    Ca    9.2      16 Apr 2018 05:45      PT/INR - ( 15 Apr 2018 07:15 )   PT: 17.3 sec;   INR: 1.57 ratio             CAPILLARY BLOOD GLUCOSE                MEDICATIONS  (STANDING):  aspirin  chewable 81 milliGRAM(s) Oral daily  atorvastatin 20 milliGRAM(s) Oral at bedtime  buDESOnide  80 MICROgram(s)/formoterol 4.5 MICROgram(s) Inhaler 2 Puff(s) Inhalation two times a day  enoxaparin Injectable 40 milliGRAM(s) SubCutaneous every 24 hours  finasteride 5 milliGRAM(s) Oral daily  tamsulosin 0.4 milliGRAM(s) Oral daily    MEDICATIONS  (PRN):  acetaminophen   Tablet. 650 milliGRAM(s) Oral every 6 hours PRN Mild Pain (1 - 3)  HYDROmorphone  Injectable 0.5 milliGRAM(s) IV Push every 4 hours PRN Moderate Pain (4 - 6)          PHYSICAL EXAM:  GENERAL: NAD, well-groomed, well-developed  HEAD:  Atraumatic, Normocephalic  CHEST/LUNG: Clear to percussion bilaterally; No rales, rhonchi, wheezing, or rubs  HEART: Regular rate and rhythm; No murmurs, rubs, or gallops  ABDOMEN: Soft, Nontender, Nondistended; Bowel sounds present  EXTREMITIES:  2+ Peripheral Pulses, No clubbing, cyanosis, or edema  LYMPH: No lymphadenopathy noted  SKIN: No rashes or lesions    Care Discussed with Consultants/Other Providers [ ] YES  [ ] NO

## 2018-04-16 NOTE — PROGRESS NOTE ADULT - PROBLEM SELECTOR PLAN 2
Hold coumadin for now until chest tubes removed. Hold coumadin for now until chest tubes removed.  Pt has been SR 60 - 80 on telemetry.

## 2018-04-17 LAB
ANION GAP SERPL CALC-SCNC: 13 MMOL/L — SIGNIFICANT CHANGE UP (ref 5–17)
APTT BLD: 30.3 SEC — SIGNIFICANT CHANGE UP (ref 27.5–37.4)
BUN SERPL-MCNC: 8 MG/DL — SIGNIFICANT CHANGE UP (ref 7–23)
CALCIUM SERPL-MCNC: 9.1 MG/DL — SIGNIFICANT CHANGE UP (ref 8.4–10.5)
CHLORIDE SERPL-SCNC: 102 MMOL/L — SIGNIFICANT CHANGE UP (ref 96–108)
CO2 SERPL-SCNC: 25 MMOL/L — SIGNIFICANT CHANGE UP (ref 22–31)
CREAT SERPL-MCNC: 0.7 MG/DL — SIGNIFICANT CHANGE UP (ref 0.5–1.3)
GLUCOSE SERPL-MCNC: 98 MG/DL — SIGNIFICANT CHANGE UP (ref 70–99)
HCT VFR BLD CALC: 26.6 % — LOW (ref 39–50)
HGB BLD-MCNC: 8.6 G/DL — LOW (ref 13–17)
INR BLD: 1.15 RATIO — SIGNIFICANT CHANGE UP (ref 0.88–1.16)
MCHC RBC-ENTMCNC: 27.8 PG — SIGNIFICANT CHANGE UP (ref 27–34)
MCHC RBC-ENTMCNC: 32.3 GM/DL — SIGNIFICANT CHANGE UP (ref 32–36)
MCV RBC AUTO: 86.1 FL — SIGNIFICANT CHANGE UP (ref 80–100)
PLATELET # BLD AUTO: 304 K/UL — SIGNIFICANT CHANGE UP (ref 150–400)
POTASSIUM SERPL-MCNC: 3.7 MMOL/L — SIGNIFICANT CHANGE UP (ref 3.5–5.3)
POTASSIUM SERPL-SCNC: 3.7 MMOL/L — SIGNIFICANT CHANGE UP (ref 3.5–5.3)
PROTHROM AB SERPL-ACNC: 13 SEC — SIGNIFICANT CHANGE UP (ref 10–13.1)
RBC # BLD: 3.09 M/UL — LOW (ref 4.2–5.8)
RBC # FLD: 13.5 % — SIGNIFICANT CHANGE UP (ref 10.3–14.5)
SODIUM SERPL-SCNC: 140 MMOL/L — SIGNIFICANT CHANGE UP (ref 135–145)
SURGICAL PATHOLOGY STUDY: SIGNIFICANT CHANGE UP
WBC # BLD: 5.84 K/UL — SIGNIFICANT CHANGE UP (ref 3.8–10.5)
WBC # FLD AUTO: 5.84 K/UL — SIGNIFICANT CHANGE UP (ref 3.8–10.5)

## 2018-04-17 PROCEDURE — 71045 X-RAY EXAM CHEST 1 VIEW: CPT | Mod: 26

## 2018-04-17 PROCEDURE — 99231 SBSQ HOSP IP/OBS SF/LOW 25: CPT

## 2018-04-17 PROCEDURE — 99232 SBSQ HOSP IP/OBS MODERATE 35: CPT

## 2018-04-17 RX ADMIN — Medication 81 MILLIGRAM(S): at 11:23

## 2018-04-17 RX ADMIN — ENOXAPARIN SODIUM 40 MILLIGRAM(S): 100 INJECTION SUBCUTANEOUS at 05:32

## 2018-04-17 RX ADMIN — BUDESONIDE AND FORMOTEROL FUMARATE DIHYDRATE 2 PUFF(S): 160; 4.5 AEROSOL RESPIRATORY (INHALATION) at 11:24

## 2018-04-17 RX ADMIN — TAMSULOSIN HYDROCHLORIDE 0.4 MILLIGRAM(S): 0.4 CAPSULE ORAL at 11:23

## 2018-04-17 RX ADMIN — BUDESONIDE AND FORMOTEROL FUMARATE DIHYDRATE 2 PUFF(S): 160; 4.5 AEROSOL RESPIRATORY (INHALATION) at 21:10

## 2018-04-17 RX ADMIN — Medication 650 MILLIGRAM(S): at 21:09

## 2018-04-17 RX ADMIN — FINASTERIDE 5 MILLIGRAM(S): 5 TABLET, FILM COATED ORAL at 11:23

## 2018-04-17 RX ADMIN — ATORVASTATIN CALCIUM 20 MILLIGRAM(S): 80 TABLET, FILM COATED ORAL at 21:09

## 2018-04-17 RX ADMIN — Medication 650 MILLIGRAM(S): at 22:00

## 2018-04-17 NOTE — PROGRESS NOTE ADULT - SUBJECTIVE AND OBJECTIVE BOX
Infectious Diseases progress note:    Subjective:  Chest tube x 1 removed.  No fever/sob/productive cough    ROS:  CONSTITUTIONAL:  No fever, chills, rigors  CARDIOVASCULAR:  No chest pain or palpitations  RESPIRATORY:   No SOB, cough, dyspnea on exertion.  No wheezing  GASTROINTESTINAL:  No abd pain, N/V, diarrhea/constipation  EXTREMITIES:  No swelling or joint pain  GENITOURINARY:  No burning on urination, increased frequency or urgency.  No flank pain  NEUROLOGIC:  No HA, visual disturbances  SKIN: No rashes    Allergies    penicillin (Flushing)    Intolerances        ANTIBIOTICS/RELEVANT:  antimicrobials    immunologic:    OTHER:  acetaminophen   Tablet. 650 milliGRAM(s) Oral every 6 hours PRN  aspirin  chewable 81 milliGRAM(s) Oral daily  atorvastatin 20 milliGRAM(s) Oral at bedtime  buDESOnide  80 MICROgram(s)/formoterol 4.5 MICROgram(s) Inhaler 2 Puff(s) Inhalation two times a day  enoxaparin Injectable 40 milliGRAM(s) SubCutaneous every 24 hours  finasteride 5 milliGRAM(s) Oral daily  HYDROmorphone  Injectable 0.5 milliGRAM(s) IV Push every 4 hours PRN  tamsulosin 0.4 milliGRAM(s) Oral daily      Objective:  Vital Signs Last 24 Hrs  T(C): 36.9 (17 Apr 2018 13:28), Max: 36.9 (16 Apr 2018 20:36)  T(F): 98.4 (17 Apr 2018 13:28), Max: 98.4 (16 Apr 2018 20:36)  HR: 72 (17 Apr 2018 13:28) (67 - 83)  BP: 119/70 (17 Apr 2018 13:28) (117/72 - 130/63)  BP(mean): --  RR: 18 (17 Apr 2018 13:28) (18 - 18)  SpO2: 92% (17 Apr 2018 13:28) (92% - 98%)    PHYSICAL EXAM:  Constitutional:NAD  Eyes:SIMA, EOMI  Ear/Nose/Throat: no thrush, mucositis.  Moist mucous membranes	  Neck:no JVD, no lymphadenopathy, supple  Respiratory: CTA mitzi, rt chest tube  Cardiovascular: S1S2 RRR, no murmurs  Gastrointestinal:soft, nontender,  nondistended (+) BS  Extremities:no e/e/c  Skin:  no rashes, open wounds or ulcerations        LABS:                        8.6    5.84  )-----------( 304      ( 17 Apr 2018 07:37 )             26.6     04-17    140  |  102  |  8   ----------------------------<  98  3.7   |  25  |  0.70    Ca    9.1      17 Apr 2018 05:53      PT/INR - ( 17 Apr 2018 07:37 )   PT: 13.0 sec;   INR: 1.15 ratio         PTT - ( 17 Apr 2018 07:37 )  PTT:30.3 sec                Rapid RVP Result: Detected          MICROBIOLOGY:    Culture - Tissue with Gram Stain (04.14.18 @ 01:41)    Gram Stain:   No polymorphonuclear cells seen per low power field  No organisms seen per oil power field    Specimen Source: .Tissue plueral peel culture    Culture Results:   No growth to date.    Culture - Blood (04.10.18 @ 13:43)    Specimen Source: .Blood Blood-Venous    Culture Results:   No growth at 5 days.    Culture - Blood (04.10.18 @ 13:43)    Specimen Source: .Blood Blood-Peripheral    Culture Results:   No growth at 5 days.          RADIOLOGY & ADDITIONAL STUDIES:    < from: Xray Chest 1 View- PORTABLE-Urgent (04.17.18 @ 12:13) >  FINDINGS:  Bibasilar linear atelectasis. No pneumothorax. Right-sided upper chest   tube is in unchanged positioning from prior study. No pneumothorax noted.   Cardiac silhouette is unremarkable. Visualized osseous structures are   unremarkable.    IMPRESSION:      Interval removal right basilar chest tube. A single right upper chest   tube remains. No pneumothorax. Minimal bibasilar atelectasis    < end of copied text >

## 2018-04-17 NOTE — PROGRESS NOTE ADULT - SUBJECTIVE AND OBJECTIVE BOX
Patient is a 67y old  Male who presents with a chief complaint of fever and cough (11 Apr 2018 10:02)      INTERVAL HPI/OVERNIGHT EVENTS:  T(C): 36.9 (04-17-18 @ 13:28), Max: 36.9 (04-16-18 @ 20:36)  HR: 72 (04-17-18 @ 13:28) (67 - 83)  BP: 119/70 (04-17-18 @ 13:28) (117/72 - 130/63)  RR: 18 (04-17-18 @ 13:28) (18 - 18)  SpO2: 92% (04-17-18 @ 13:28) (92% - 98%)  Wt(kg): --  I&O's Summary    16 Apr 2018 07:01  -  17 Apr 2018 07:00  --------------------------------------------------------  IN: 1620 mL / OUT: 1440 mL / NET: 180 mL    17 Apr 2018 07:01  -  17 Apr 2018 15:02  --------------------------------------------------------  IN: 600 mL / OUT: 400 mL / NET: 200 mL        LABS:                        8.6    5.84  )-----------( 304      ( 17 Apr 2018 07:37 )             26.6     04-17    140  |  102  |  8   ----------------------------<  98  3.7   |  25  |  0.70    Ca    9.1      17 Apr 2018 05:53      PT/INR - ( 17 Apr 2018 07:37 )   PT: 13.0 sec;   INR: 1.15 ratio         PTT - ( 17 Apr 2018 07:37 )  PTT:30.3 sec    CAPILLARY BLOOD GLUCOSE                MEDICATIONS  (STANDING):  aspirin  chewable 81 milliGRAM(s) Oral daily  atorvastatin 20 milliGRAM(s) Oral at bedtime  buDESOnide  80 MICROgram(s)/formoterol 4.5 MICROgram(s) Inhaler 2 Puff(s) Inhalation two times a day  enoxaparin Injectable 40 milliGRAM(s) SubCutaneous every 24 hours  finasteride 5 milliGRAM(s) Oral daily  tamsulosin 0.4 milliGRAM(s) Oral daily    MEDICATIONS  (PRN):  acetaminophen   Tablet. 650 milliGRAM(s) Oral every 6 hours PRN Mild Pain (1 - 3)  HYDROmorphone  Injectable 0.5 milliGRAM(s) IV Push every 4 hours PRN Moderate Pain (4 - 6)          PHYSICAL EXAM:  GENERAL: NAD, well-groomed, well-developed  HEAD:  Atraumatic, Normocephalic  CHEST/LUNG: Clear to percussion bilaterally; No rales, rhonchi, wheezing, or rubs  HEART: Regular rate and rhythm; No murmurs, rubs, or gallops  ABDOMEN: Soft, Nontender, Nondistended; Bowel sounds present  EXTREMITIES:  2+ Peripheral Pulses, No clubbing, cyanosis, or edema  LYMPH: No lymphadenopathy noted  SKIN: No rashes or lesions    Care Discussed with Consultants/Other Providers [+ ] YES  [ ] NO

## 2018-04-17 NOTE — PROGRESS NOTE ADULT - ASSESSMENT
67M with PMHx of Afib on Coumadin, HTN, CAD s/p CABG, MVR, admitted for progressive BROOKS s/p Rt thoracentesis 2 weeks ago. Pt s/p Chest CT today with reoccurrence of small right pleural effusion without enhancement to suggest empyema and adjacent partial right lower lobe atelectasis. Right lung with extensive loculation and hemothorax. Underwent 4/13 Decortication was performed. Pleural biopsies taken.   4/14: Stable for transfer to floor-2 DSU  4/15: Awaiting biopsy results from OR specimen. Continue CT to wall suction today. Cxr in am  4/16 Chest tubes placed to water seal today. Output 20ml/24h in both.   4/17 Rt anterior (#1 chest tube removed)  remaining posterior  tube on seal

## 2018-04-17 NOTE — PROGRESS NOTE ADULT - SUBJECTIVE AND OBJECTIVE BOX
VITAL SIGNS    Telemetry:  afib 60s  Vital Signs Last 24 Hrs  T(C): 36.6 (18 @ 05:03), Max: 36.9 (18 @ 14:20)  T(F): 97.8 (18 @ 05:03), Max: 98.4 (18 @ 14:20)  HR: 67 (18 @ 05:03) (67 - 83)  BP: 130/63 (18 @ 05:03) (117/72 - 130/63)  RR: 18 (18 @ 05:03) (18 - 18)  SpO2: 98% (18 @ 05:03) (93% - 98%)             @ 07:01  -   @ 07:00  --------------------------------------------------------  IN: 1620 mL / OUT: 1440 mL / NET: 180 mL     @ 07:01  -   @ 13:24  --------------------------------------------------------  IN: 360 mL / OUT: 300 mL / NET: 60 mL         Daily Weight in k.5 (2018 06:03)  Admit Wt: Drug Dosing Weight  Height (cm): 177.8 (2018 05:34)  Weight (kg): 105.5 (2018 06:03)  BMI (kg/m2): 33.4 (2018 06:03)  BSA (m2): 2.23 (2018 06:03)          MEDICATIONS  acetaminophen   Tablet. 650 milliGRAM(s) Oral every 6 hours PRN  aspirin  chewable 81 milliGRAM(s) Oral daily  atorvastatin 20 milliGRAM(s) Oral at bedtime  buDESOnide  80 MICROgram(s)/formoterol 4.5 MICROgram(s) Inhaler 2 Puff(s) Inhalation two times a day  enoxaparin Injectable 40 milliGRAM(s) SubCutaneous every 24 hours  finasteride 5 milliGRAM(s) Oral daily  HYDROmorphone  Injectable 0.5 milliGRAM(s) IV Push every 4 hours PRN  tamsulosin 0.4 milliGRAM(s) Oral daily      PHYSICAL EXAM    Subjective:  no complaints   Neurology: alert and oriented x 3, nonfocal, no gross deficits  CV : s1s1 reg no MRGS  Lungs: CTA, equal chest expansion, Rt pleural chest tubes present x2 anterior output 0ml posterior 40ml in 24h , Rt flank  VATS site CDI   Abdomen: soft, nontender, nondistended, positive bowel sounds, last bowel movement   :    ids  Extremities: no  edema,  +dp b/l , no calf tenderness b/l , warm and perfused b/l    < from: Xray Chest 1 View- PORTABLE-Routine (18 @ 09:51) >  Bibasilar linear atelectasis. No pneumothorax. Right-sided chest tube is   in unchanged positioning from prior study. No pneumothorax noted. Cardiac   silhouette is unremarkable. Visualized osseous structures are   unremarkable.    IMPRESSION:  Bibasilar linear atelectasis. No pneumothorax.      < end of copied text >      LABS      140  |  102  |  8   ----------------------------<  98  3.7   |  25  |  0.70    Ca    9.1      2018 05:53                                   8.6    5.84  )-----------( 304      ( 2018 07:37 )             26.6          PT/INR - ( 2018 07:37 )   PT: 13.0 sec;   INR: 1.15 ratio         PTT - ( 2018 07:37 )  PTT:30.3 sec         PAST MEDICAL & SURGICAL HISTORY:  Kidney stones  Hyperlipidemia  Hypertension  CAD (coronary artery disease)  H/O mitral valve replacement       Physical Therapy Rec:   Home  [x  ]   Home w/ PT  [  ]  Rehab  [  ]

## 2018-04-17 NOTE — PROGRESS NOTE ADULT - ASSESSMENT
68 yo male presenting with 2 day hx of acutely worsening cough and fever.  fever high of 101.3 this morning.  productive cough which has been going on for a few days. yellowish sputum.  did not take anything for his symptoms      Acute febrile illness.  Plan: fredrick viral  ID fu appreciated  will monitor.      Pleural effusion.  Plan: pulmonary fu  sp VATS   chest tube management as per surgery    A fib Plan cards consult for restarting coumadin

## 2018-04-17 NOTE — PROGRESS NOTE ADULT - PROBLEM SELECTOR PLAN 1
s/p thorocentesis by--radiology   prior pleural -cyto-negative prior  CTS Sue to taken to OR for VATS bx 4/13--await path s/p thorocentesis by--radiology   prior pleural -cyto-negative prior  CTS Sue to taken to OR for VATS bx 4/13--await path  CT as per CTS

## 2018-04-17 NOTE — PROGRESS NOTE ADULT - ATTENDING COMMENTS
as above-  s/p prior non-dx  thoro -- re-admit w/recurrent effusion for VATS  (DDX-asbestos related, lung CA, empyema, lymphoma)  VATS-4/13--await path;     ; optimal pain control post op d/w pt. CT as per CTS.  Bronchodilator rx as above    Chirag Flynn MD-Pulmonary   [phone]

## 2018-04-17 NOTE — PROGRESS NOTE ADULT - ASSESSMENT
67 M with a PMH of HTN, HLD, CAD, diastolic dysfunction,  s/p MV annuloplasty, A fib on coumadin, s/p recent hospitalization for SOB with draiange of R pleural effusion, now returning with enterorhino virus infection and SOB with a persistent loculated R pleural effusion mildly increased in size.     Recurring R pleural Effusion:  - Pt seen by thoracic surgery, had VATs 4/13  - Currently saturating well on RA  -  Duonebs Q6H  -  Symbicort BID  - Chest PT, incentive spirometry/ acapella Q6H  -AC held for OR today 4/13

## 2018-04-17 NOTE — PROGRESS NOTE ADULT - PROBLEM SELECTOR PLAN 1
RT antrior chest tube removed today   Continue  remaining posterior  chest tube  to water seal today- strict I & Os  Check CXR in AM.   Will DC chest tube in AM if CXR stable and with minimal drainage.  Cough and deep breathe, Incentive Spirometry Q1h, Chest PT.  OOB to chair, Ambulate 4x daily  Continue with management per primary team, will follow.

## 2018-04-17 NOTE — PROGRESS NOTE ADULT - ASSESSMENT
67 M with a PMH of HTN, HLD, CAD, diastolic dysfunction,  s/p MV annuloplasty, A fib on coumadin, s/p recent hospital admission from 3/25-3/29/18 during which he underwent a R thoracentesis with removal of 1 L. The pleural fluid studies were consistent with exudative fluid , cytopath was negative for malignancy, cultures negative.  He now returns with complaints of fever of 101 F for 1-2 days along with a cough productive of clear and occasionally yellow sputum as well as worsening shortness of breath. He reports that the shortness of breath has been gradually worsening for the past 1.5 week. His exercise tolerance has been increasingly limited due to the SOB. He denies any chest pain, nausea, vomiting, diarrhea dysuria or hematuria but does report mild intermittent wheezing. An RVP was positive for entero rhino virus and a Ct chest showed increase in a small to moderate loculated R pleural effusion.     ER vitals:  T 98.4, P 88, /81, RR 26, 97% RA.   Pt was given a dose of vanco/levaquin in the ER.  Pt seen by thoracic surgery and is awaiting R VATS with pleural drainage and biopsy.     Problem/Plan - 1:    ·	Upper respiratory viral infection    - RVP (+) entero/rhinovirus.  No role for antivirals, cont supportive care including duonebs, cont symbicort.  Wheezing improved    - Do not suspect superimposed bacterial pneumonia. Will monitor off abx for now.  Pt without fever or leukocytosis.  No purulent phlegm.       Problem/Plan - 2:    ·	Right pleural effusion    - Increased from last admission.  s/p VATS, CTS following    - f/u pleural fluid analysis, and culture data (bacterial cultures negative).  Prior AFB cultures from pleural fluid analysis negative.  Pt had negative PPD testing at PCPs office 2 weeks prior to admission.  Denies any history of TB exposure.      - Spoke to Dr. Corea from pathology - no evidence for granulomatous process or fungal process on pathology specimen, no need for further stains given low yield    No further ID w/u at this time.    Will follow,    Josselin Garcia    [phone]

## 2018-04-17 NOTE — PROGRESS NOTE ADULT - SUBJECTIVE AND OBJECTIVE BOX
CHIEF COMPLAINT:    Interval Events:    REVIEW OF SYSTEMS:  Constitutional: No fevers or chills. No weight loss. No fatigue or generalized malaise.  Eyes: No itching or discharge from the eyes  ENT: No ear pain. No ear discharge. No nasal congestion. No post nasal drip. No epistaxis. No throat pain. No sore throat. No difficulty swallowing.   CV: No chest pain. No palpitations. No lightheadedness or dizziness.   Resp: No dyspnea at rest. No dyspnea on exertion. No orthopnea. No wheezing. No cough. No stridor. No sputum production. No chest pain with respiration.  GI: No nausea. No vomiting. No diarrhea.  MSK: No joint pain or pain in any extremities  Integumentary: No skin lesions. No pedal edema.  Neurological: No gross motor weakness. No sensory changes.  [ ] All other systems negative  [ ] Unable to assess ROS because ________    OBJECTIVE:  ICU Vital Signs Last 24 Hrs  T(C): 36.9 (16 Apr 2018 20:36), Max: 37 (16 Apr 2018 04:55)  T(F): 98.4 (16 Apr 2018 20:36), Max: 98.6 (16 Apr 2018 04:55)  HR: 83 (16 Apr 2018 20:36) (76 - 83)  BP: 117/72 (16 Apr 2018 20:36) (117/72 - 124/82)  BP(mean): --  ABP: --  ABP(mean): --  RR: 18 (16 Apr 2018 20:36) (18 - 20)  SpO2: 93% (16 Apr 2018 20:36) (93% - 94%)        04-15 @ 07:01 - 04-16 @ 07:00  --------------------------------------------------------  IN: 2280 mL / OUT: 3465 mL / NET: -1185 mL    04-16 @ 07:01  - 04-17 @ 04:49  --------------------------------------------------------  IN: 1620 mL / OUT: 1000 mL / NET: 620 mL      CAPILLARY BLOOD GLUCOSE          PHYSICAL EXAM:  General: Awake, alert, oriented X 3.   HEENT: Atraumatic, normocephalic.                 Mallampatti Grade                 No nasal congestion.                No tonsillar or pharyngeal exudates.  Lymph Nodes: No palpable lymphadenopathy  Neck: No JVD. No carotid bruit.   Respiratory: Normal chest expansion                         Normal percussion                         Normal and equal air entry                         No wheeze, rhonchi or rales.  Cardiovascular: S1 S2 normal. No murmurs, rubs or gallops.   Abdomen: Soft, non-tender, non-distended. No organomegaly.  Extremities: Warm to touch. Peripheral pulse palpable. No pedal edema.   Skin: No rashes or skin lesions  Neurological: Motor and sensory examination equal and normal in all four extremities.  Psychiatry: Appropriate mood and affect.    HOSPITAL MEDICATIONS:  MEDICATIONS  (STANDING):  aspirin  chewable 81 milliGRAM(s) Oral daily  atorvastatin 20 milliGRAM(s) Oral at bedtime  buDESOnide  80 MICROgram(s)/formoterol 4.5 MICROgram(s) Inhaler 2 Puff(s) Inhalation two times a day  enoxaparin Injectable 40 milliGRAM(s) SubCutaneous every 24 hours  finasteride 5 milliGRAM(s) Oral daily  tamsulosin 0.4 milliGRAM(s) Oral daily    MEDICATIONS  (PRN):  acetaminophen   Tablet. 650 milliGRAM(s) Oral every 6 hours PRN Mild Pain (1 - 3)  HYDROmorphone  Injectable 0.5 milliGRAM(s) IV Push every 4 hours PRN Moderate Pain (4 - 6)      LABS:                        9.2    6.93  )-----------( 278      ( 16 Apr 2018 07:34 )             28.8     04-16    140  |  102  |  8   ----------------------------<  101<H>  3.9   |  27  |  0.71    Ca    9.2      16 Apr 2018 05:45      PT/INR - ( 15 Apr 2018 07:15 )   PT: 17.3 sec;   INR: 1.57 ratio                   MICROBIOLOGY:     RADIOLOGY:  [ ] Reviewed and interpreted by me    Point of Care Ultrasound Findings:    PFT:    EKG: CHIEF COMPLAINT: better over all- still cough -was able to walk    Interval Events: ambulating; CT still in place    REVIEW OF SYSTEMS:  Constitutional: No fevers or chills. No weight loss. No fatigue or generalized malaise.  Eyes: No itching or discharge from the eyes  ENT: No ear pain. No ear discharge. No nasal congestion. No post nasal drip. No epistaxis. No throat pain. No sore throat. No difficulty swallowing.   CV: No chest pain. No palpitations. No lightheadedness or dizziness.   Resp: No dyspnea at rest. + dyspnea on exertion. No orthopnea. No wheezing. + cough. No stridor. No sputum production. No chest pain with respiration.  GI: No nausea. No vomiting. No diarrhea.  MSK: No joint pain or pain in any extremities  Integumentary: No skin lesions. No pedal edema.  Neurological: No gross motor weakness. No sensory changes.  [+ ] All other systems negative  [ ] Unable to assess ROS because ________    OBJECTIVE:  ICU Vital Signs Last 24 Hrs  T(C): 36.9 (16 Apr 2018 20:36), Max: 37 (16 Apr 2018 04:55)  T(F): 98.4 (16 Apr 2018 20:36), Max: 98.6 (16 Apr 2018 04:55)  HR: 83 (16 Apr 2018 20:36) (76 - 83)  BP: 117/72 (16 Apr 2018 20:36) (117/72 - 124/82)  BP(mean): --  ABP: --  ABP(mean): --  RR: 18 (16 Apr 2018 20:36) (18 - 20)  SpO2: 93% (16 Apr 2018 20:36) (93% - 94%)        04-15 @ 07:01  -  04-16 @ 07:00  --------------------------------------------------------  IN: 2280 mL / OUT: 3465 mL / NET: -1185 mL    04-16 @ 07:01  - 04-17 @ 04:49  --------------------------------------------------------  IN: 1620 mL / OUT: 1000 mL / NET: 620 mL      CAPILLARY BLOOD GLUCOSE          PHYSICAL EXAM: NAD in bed  General: Awake, alert, oriented X 3.   HEENT: Atraumatic, normocephalic.                 Mallampatti Grade 3                No nasal congestion.                No tonsillar or pharyngeal exudates.  Lymph Nodes: No palpable lymphadenopathy  Neck: No JVD. No carotid bruit.   Respiratory: abnormal chest expansion--reduced on right side; 2 CT in place                         Normal percussion                         Normal and equal air entry                         No wheeze, rhonchi or rales.  Cardiovascular: S1 S2 normal. No murmurs, rubs or gallops.   Abdomen: Soft, non-tender, non-distended. No organomegaly.  Extremities: Warm to touch. Peripheral pulse palpable. No pedal edema.   Skin: No rashes or skin lesions  Neurological: Motor and sensory examination equal and normal in all four extremities.  Psychiatry: Appropriate mood and affect.    HOSPITAL MEDICATIONS:  MEDICATIONS  (STANDING):  aspirin  chewable 81 milliGRAM(s) Oral daily  atorvastatin 20 milliGRAM(s) Oral at bedtime  buDESOnide  80 MICROgram(s)/formoterol 4.5 MICROgram(s) Inhaler 2 Puff(s) Inhalation two times a day  enoxaparin Injectable 40 milliGRAM(s) SubCutaneous every 24 hours  finasteride 5 milliGRAM(s) Oral daily  tamsulosin 0.4 milliGRAM(s) Oral daily    MEDICATIONS  (PRN):  acetaminophen   Tablet. 650 milliGRAM(s) Oral every 6 hours PRN Mild Pain (1 - 3)  HYDROmorphone  Injectable 0.5 milliGRAM(s) IV Push every 4 hours PRN Moderate Pain (4 - 6)      LABS:                        9.2    6.93  )-----------( 278      ( 16 Apr 2018 07:34 )             28.8     04-16    140  |  102  |  8   ----------------------------<  101<H>  3.9   |  27  |  0.71    Ca    9.2      16 Apr 2018 05:45      PT/INR - ( 15 Apr 2018 07:15 )   PT: 17.3 sec;   INR: 1.57 ratio                   MICROBIOLOGY:     RADIOLOGY:  [ ] Reviewed and interpreted by me    Point of Care Ultrasound Findings:    PFT:    EKG:

## 2018-04-17 NOTE — CHART NOTE - NSCHARTNOTEFT_GEN_A_CORE
Patient seen and cased reviewed. Known to me from the office. He has prior atrial flutter ablation and have not seen AF in follow up  Plan was to place an ILR and discontinue coumadin  Because of hemorrhagic pleural effusion would hold coumadin and would consider an event monitor or ILR at later date.   Discussed with patient. Will discuss with his son as well.

## 2018-04-18 LAB
ANION GAP SERPL CALC-SCNC: 11 MMOL/L — SIGNIFICANT CHANGE UP (ref 5–17)
APTT BLD: 30 SEC — SIGNIFICANT CHANGE UP (ref 27.5–37.4)
BUN SERPL-MCNC: 11 MG/DL — SIGNIFICANT CHANGE UP (ref 7–23)
CALCIUM SERPL-MCNC: 9.4 MG/DL — SIGNIFICANT CHANGE UP (ref 8.4–10.5)
CHLORIDE SERPL-SCNC: 104 MMOL/L — SIGNIFICANT CHANGE UP (ref 96–108)
CO2 SERPL-SCNC: 26 MMOL/L — SIGNIFICANT CHANGE UP (ref 22–31)
CREAT SERPL-MCNC: 0.78 MG/DL — SIGNIFICANT CHANGE UP (ref 0.5–1.3)
CULTURE RESULTS: SIGNIFICANT CHANGE UP
GLUCOSE SERPL-MCNC: 100 MG/DL — HIGH (ref 70–99)
HCT VFR BLD CALC: 25.8 % — LOW (ref 39–50)
HGB BLD-MCNC: 8.5 G/DL — LOW (ref 13–17)
INR BLD: 1.09 RATIO — SIGNIFICANT CHANGE UP (ref 0.88–1.16)
MCHC RBC-ENTMCNC: 28.1 PG — SIGNIFICANT CHANGE UP (ref 27–34)
MCHC RBC-ENTMCNC: 32.9 GM/DL — SIGNIFICANT CHANGE UP (ref 32–36)
MCV RBC AUTO: 85.4 FL — SIGNIFICANT CHANGE UP (ref 80–100)
PLATELET # BLD AUTO: 320 K/UL — SIGNIFICANT CHANGE UP (ref 150–400)
POTASSIUM SERPL-MCNC: 4 MMOL/L — SIGNIFICANT CHANGE UP (ref 3.5–5.3)
POTASSIUM SERPL-SCNC: 4 MMOL/L — SIGNIFICANT CHANGE UP (ref 3.5–5.3)
PROTHROM AB SERPL-ACNC: 12.3 SEC — SIGNIFICANT CHANGE UP (ref 10–13.1)
RBC # BLD: 3.02 M/UL — LOW (ref 4.2–5.8)
RBC # FLD: 13.8 % — SIGNIFICANT CHANGE UP (ref 10.3–14.5)
SODIUM SERPL-SCNC: 141 MMOL/L — SIGNIFICANT CHANGE UP (ref 135–145)
SPECIMEN SOURCE: SIGNIFICANT CHANGE UP
WBC # BLD: 5.28 K/UL — SIGNIFICANT CHANGE UP (ref 3.8–10.5)
WBC # FLD AUTO: 5.28 K/UL — SIGNIFICANT CHANGE UP (ref 3.8–10.5)

## 2018-04-18 PROCEDURE — 71045 X-RAY EXAM CHEST 1 VIEW: CPT | Mod: 26,76

## 2018-04-18 PROCEDURE — 99232 SBSQ HOSP IP/OBS MODERATE 35: CPT

## 2018-04-18 RX ADMIN — Medication 650 MILLIGRAM(S): at 21:03

## 2018-04-18 RX ADMIN — ENOXAPARIN SODIUM 40 MILLIGRAM(S): 100 INJECTION SUBCUTANEOUS at 05:26

## 2018-04-18 RX ADMIN — ATORVASTATIN CALCIUM 20 MILLIGRAM(S): 80 TABLET, FILM COATED ORAL at 21:02

## 2018-04-18 RX ADMIN — BUDESONIDE AND FORMOTEROL FUMARATE DIHYDRATE 2 PUFF(S): 160; 4.5 AEROSOL RESPIRATORY (INHALATION) at 11:28

## 2018-04-18 RX ADMIN — Medication 100 MILLIGRAM(S): at 11:36

## 2018-04-18 RX ADMIN — Medication 81 MILLIGRAM(S): at 11:28

## 2018-04-18 RX ADMIN — TAMSULOSIN HYDROCHLORIDE 0.4 MILLIGRAM(S): 0.4 CAPSULE ORAL at 11:29

## 2018-04-18 RX ADMIN — FINASTERIDE 5 MILLIGRAM(S): 5 TABLET, FILM COATED ORAL at 11:29

## 2018-04-18 RX ADMIN — Medication 650 MILLIGRAM(S): at 21:55

## 2018-04-18 RX ADMIN — BUDESONIDE AND FORMOTEROL FUMARATE DIHYDRATE 2 PUFF(S): 160; 4.5 AEROSOL RESPIRATORY (INHALATION) at 21:03

## 2018-04-18 NOTE — PROGRESS NOTE ADULT - ATTENDING COMMENTS
as above-  s/p prior non-dx  thoro -- re-admit w/recurrent effusion for VATS  (DDX-asbestos related, lung CA, empyema, lymphoma)  VATS-4/13--await path;     ; optimal pain control post op d/w pt. CT as per CTS.  Bronchodilator rx as above   AC decision as per Robbin et al.    Chirag Flynn MD-Pulmonary   [phone]

## 2018-04-18 NOTE — DIETITIAN INITIAL EVALUATION ADULT. - ORAL INTAKE PTA
good/Pt reports good po intake/appetite PTA. Typical meal intake: bran cereal or egg white with fruit and orange juice for breakfast. Lunch: Veggie burger with vegetables. Dinner: Fish or Vegetable entree with vegetables and starch. Pt's wife primarily responsible for the meal prep at home. Pt reports taking vitamin D and fish oil supplements PTA.

## 2018-04-18 NOTE — DIETITIAN INITIAL EVALUATION ADULT. - ADHERENCE
good/Pt reports starting a hzhhu-[id number]-slp-vegetarian diet as of Nov 2017 to promote weight loss and for general health purposes. Therefore states diet has been healthier and since he eats mostly from home low in sodium content.

## 2018-04-18 NOTE — PROGRESS NOTE ADULT - SUBJECTIVE AND OBJECTIVE BOX
VITAL SIGNS    Telemetry:  SR with pacs  Vital Signs Last 24 Hrs  T(C): 36.7 (18 @ 13:14), Max: 36.9 (18 @ 13:28)  T(F): 98 (18 @ 13:14), Max: 98.4 (18 @ 13:28)  HR: 65 (18 @ 13:14) (64 - 72)  BP: 130/77 (18 @ 13:14) (119/70 - 138/53)  RR: 18 (18 @ 13:14) (16 - 18)  SpO2: 92% (18 @ 13:14) (92% - 93%)             @ 07:01  -   @ 07:00  --------------------------------------------------------  IN: 600 mL / OUT: 2200 mL / NET: -1600 mL     @ 07:01  -   @ 13:17  --------------------------------------------------------  IN: 240 mL / OUT: 600 mL / NET: -360 mL       Daily     Daily Weight in k.2 (2018 07:27)  Admit Wt: Drug Dosing Weight  Height (cm): 177.8 (2018 05:34)  Weight (kg): 105.5 (2018 06:03)  BMI (kg/m2): 33.4 (2018 06:03)  BSA (m2): 2.23 (2018 06:03)                MEDICATIONS  acetaminophen   Tablet. 650 milliGRAM(s) Oral every 6 hours PRN  aspirin  chewable 81 milliGRAM(s) Oral daily  atorvastatin 20 milliGRAM(s) Oral at bedtime  buDESOnide  80 MICROgram(s)/formoterol 4.5 MICROgram(s) Inhaler 2 Puff(s) Inhalation two times a day  enoxaparin Injectable 40 milliGRAM(s) SubCutaneous every 24 hours  finasteride 5 milliGRAM(s) Oral daily  guaiFENesin    Syrup 100 milliGRAM(s) Oral every 6 hours PRN  HYDROmorphone  Injectable 0.5 milliGRAM(s) IV Push every 4 hours PRN  tamsulosin 0.4 milliGRAM(s) Oral daily      PHYSICAL EXAM    Subjective: no complaints   Neurology: alert and oriented x 3, nonfocal, no gross deficits  CV : s1s1 reg no MRGS  l Wound :  Rt  flank  CDI Rt Pl chest tube to water seal no crepitus no airleak   Lungs: CTA, equal chest expansion  Abdomen: soft, nontender, nondistended, positive bowel sounds, last bowel movement   :   voids  Extremities:    no edema,  +dp b/l , no calf tenderness b/l , warm and perfused b/l    LABS      141  |  104  |  11  ----------------------------<  100<H>  4.0   |  26  |  0.78    Ca    9.4      2018 05:19                                   8.5    5.28  )-----------( 320      ( 2018 07:40 )             25.8          PT/INR - ( 2018 07:40 )   PT: 12.3 sec;   INR: 1.09 ratio         PTT - ( 2018 07:40 )  PTT:30.0 sec         PAST MEDICAL & SURGICAL HISTORY:  Kidney stones  Hyperlipidemia  Hypertension  CAD (coronary artery disease)  H/O mitral valve replacement       Physical Therapy Rec:   Home  [  ]   Home w/ PT  [  ]  Rehab  [  ]

## 2018-04-18 NOTE — PROGRESS NOTE ADULT - SUBJECTIVE AND OBJECTIVE BOX
CHIEF COMPLAINT: good spirits- ambulating--mild sob but still w/cough    Interval Events: EPS, CT #1 out    REVIEW OF SYSTEMS:  Constitutional: No fevers or chills. No weight loss. No fatigue or generalized malaise.  Eyes: No itching or discharge from the eyes  ENT: No ear pain. No ear discharge. No nasal congestion. No post nasal drip. No epistaxis. No throat pain. No sore throat. No difficulty swallowing.   CV: No chest pain. No palpitations. No lightheadedness or dizziness.   Resp: No dyspnea at rest. No dyspnea on exertion. No orthopnea. No wheezing. + cough. No stridor. No sputum production. No chest pain with respiration.  GI: No nausea. No vomiting. No diarrhea.  MSK: No joint pain or pain in any extremities  Integumentary: No skin lesions. No pedal edema.  Neurological: No gross motor weakness. No sensory changes.  [+ ] All other systems negative  [ ] Unable to assess ROS because ________    OBJECTIVE:  ICU Vital Signs Last 24 Hrs  T(C): 36.6 (18 Apr 2018 04:48), Max: 36.9 (17 Apr 2018 13:28)  T(F): 97.9 (18 Apr 2018 04:48), Max: 98.4 (17 Apr 2018 13:28)  HR: 65 (18 Apr 2018 04:48) (64 - 72)  BP: 131/78 (18 Apr 2018 04:48) (119/70 - 138/53)  BP(mean): --  ABP: --  ABP(mean): --  RR: 16 (18 Apr 2018 04:48) (16 - 18)  SpO2: 93% (18 Apr 2018 04:48) (92% - 93%)        04-16 @ 07:01  -  04-17 @ 07:00  --------------------------------------------------------  IN: 1620 mL / OUT: 1440 mL / NET: 180 mL    04-17 @ 07:01  -  04-18 @ 05:06  --------------------------------------------------------  IN: 600 mL / OUT: 2200 mL / NET: -1600 mL      CAPILLARY BLOOD GLUCOSE          PHYSICAL EXAM: NAd in bed  General: Awake, alert, oriented X 3.   HEENT: Atraumatic, normocephalic.                 Mallampatti Grade 3                No nasal congestion.                No tonsillar or pharyngeal exudates.  Lymph Nodes: No palpable lymphadenopathy  Neck: No JVD. No carotid bruit.   Respiratory: abnormal chest expansion-reduced BS left base                         Normal percussion                         Normal and equal air entry                         No wheeze, rhonchi or rales.  Cardiovascular: S1 S2 normal. No murmurs, rubs or gallops.   Abdomen: Soft, non-tender, non-distended. No organomegaly.  Extremities: Warm to touch. Peripheral pulse palpable. No pedal edema.   Skin: No rashes or skin lesions  Neurological: Motor and sensory examination equal and normal in all four extremities.  Psychiatry: Appropriate mood and affect.    HOSPITAL MEDICATIONS:  MEDICATIONS  (STANDING):  aspirin  chewable 81 milliGRAM(s) Oral daily  atorvastatin 20 milliGRAM(s) Oral at bedtime  buDESOnide  80 MICROgram(s)/formoterol 4.5 MICROgram(s) Inhaler 2 Puff(s) Inhalation two times a day  enoxaparin Injectable 40 milliGRAM(s) SubCutaneous every 24 hours  finasteride 5 milliGRAM(s) Oral daily  tamsulosin 0.4 milliGRAM(s) Oral daily    MEDICATIONS  (PRN):  acetaminophen   Tablet. 650 milliGRAM(s) Oral every 6 hours PRN Mild Pain (1 - 3)  HYDROmorphone  Injectable 0.5 milliGRAM(s) IV Push every 4 hours PRN Moderate Pain (4 - 6)      LABS:                        8.6    5.84  )-----------( 304      ( 17 Apr 2018 07:37 )             26.6     04-17    140  |  102  |  8   ----------------------------<  98  3.7   |  25  |  0.70    Ca    9.1      17 Apr 2018 05:53      PT/INR - ( 17 Apr 2018 07:37 )   PT: 13.0 sec;   INR: 1.15 ratio         PTT - ( 17 Apr 2018 07:37 )  PTT:30.3 sec          MICROBIOLOGY:     RADIOLOGY:  [ ] Reviewed and interpreted by me    Point of Care Ultrasound Findings:    PFT:    EKG:

## 2018-04-18 NOTE — PROGRESS NOTE ADULT - ASSESSMENT
68 yo male presenting with 2 day hx of acutely worsening cough and fever.  fever high of 101.3 this morning.  productive cough which has been going on for a few days. yellowish sputum.  did not take anything for his symptoms      Acute febrile illness.  Plan: fredrick viral  ID fu appreciated  will monitor.      Pleural effusion.  Plan: pulmonary fu  sp VATS   chest tube removed    A fib Plan cards consult for restarting coumadin  pt now in SR  still awaiting final recommendations

## 2018-04-18 NOTE — DIETITIAN INITIAL EVALUATION ADULT. - ENERGY NEEDS
ht: 70 inches. wt: 231 pounds (current, standing, no edema noted). BMI: 33.1 kG/m2. UBW:  IBW: 166 pounds +/- 10%. %IBW: 139%  Other pertinent objective information: 67 year old male pt with PMH HTN, HLD, CAD, diastolic dysfunction s/p MV annuloplasty, Afib on Coumadin (currently held), s/p recent hosp for SOB with drainage of R pleural effusion. Now with enterorhino virus infection and SOB with a persistent loculated R pleural effusion, s/p VATS biopsy 4/13, awaiting pathology. No pressure ulcers noted. ht: 70 inches. wt: 231 pounds (current, standing, no edema noted). BMI: 33.1 kG/m2. UBW: 255 pounds x 6 months ago. IBW: 166 pounds +/- 10%. %IBW: 139%  Other pertinent objective information: 67 year old male pt with PMH HTN, HLD, CAD, diastolic dysfunction s/p MV annuloplasty, Afib on Coumadin (currently held), s/p recent hosp for SOB with drainage of R pleural effusion. Now with enterorhino virus infection and SOB with a persistent loculated R pleural effusion, s/p VATS biopsy 4/13, awaiting pathology. No pressure ulcers noted.

## 2018-04-18 NOTE — PROGRESS NOTE ADULT - SUBJECTIVE AND OBJECTIVE BOX
Patient is a 67y old  Male who presents with a chief complaint of fever and cough (11 Apr 2018 10:02)      INTERVAL HPI/OVERNIGHT EVENTS:  T(C): 36.7 (04-18-18 @ 13:14), Max: 36.8 (04-17-18 @ 20:44)  HR: 65 (04-18-18 @ 13:14) (64 - 65)  BP: 130/77 (04-18-18 @ 13:14) (130/77 - 138/53)  RR: 18 (04-18-18 @ 13:14) (16 - 18)  SpO2: 92% (04-18-18 @ 13:14) (92% - 93%)  Wt(kg): --  I&O's Summary    17 Apr 2018 07:01  -  18 Apr 2018 07:00  --------------------------------------------------------  IN: 600 mL / OUT: 2200 mL / NET: -1600 mL    18 Apr 2018 07:01  -  18 Apr 2018 17:58  --------------------------------------------------------  IN: 660 mL / OUT: 1240 mL / NET: -580 mL        LABS:                        8.5    5.28  )-----------( 320      ( 18 Apr 2018 07:40 )             25.8     04-18    141  |  104  |  11  ----------------------------<  100<H>  4.0   |  26  |  0.78    Ca    9.4      18 Apr 2018 05:19      PT/INR - ( 18 Apr 2018 07:40 )   PT: 12.3 sec;   INR: 1.09 ratio         PTT - ( 18 Apr 2018 07:40 )  PTT:30.0 sec    CAPILLARY BLOOD GLUCOSE                MEDICATIONS  (STANDING):  aspirin  chewable 81 milliGRAM(s) Oral daily  atorvastatin 20 milliGRAM(s) Oral at bedtime  buDESOnide  80 MICROgram(s)/formoterol 4.5 MICROgram(s) Inhaler 2 Puff(s) Inhalation two times a day  enoxaparin Injectable 40 milliGRAM(s) SubCutaneous every 24 hours  finasteride 5 milliGRAM(s) Oral daily  tamsulosin 0.4 milliGRAM(s) Oral daily    MEDICATIONS  (PRN):  acetaminophen   Tablet. 650 milliGRAM(s) Oral every 6 hours PRN Mild Pain (1 - 3)  guaiFENesin    Syrup 100 milliGRAM(s) Oral every 6 hours PRN Cough  HYDROmorphone  Injectable 0.5 milliGRAM(s) IV Push every 4 hours PRN Moderate Pain (4 - 6)          PHYSICAL EXAM:  GENERAL: NAD, well-groomed, well-developed  HEAD:  Atraumatic, Normocephalic  CHEST/LUNG: Clear to percussion bilaterally; No rales, rhonchi, wheezing, or rubs  HEART: Regular rate and rhythm; No murmurs, rubs, or gallops  ABDOMEN: Soft, Nontender, Nondistended; Bowel sounds present  EXTREMITIES:  2+ Peripheral Pulses, No clubbing, cyanosis, or edema  LYMPH: No lymphadenopathy noted  SKIN: No rashes or lesions    Care Discussed with Consultants/Other Providers [ ] YES  [ ] NO

## 2018-04-18 NOTE — DIETITIAN INITIAL EVALUATION ADULT. - NS AS NUTRI INTERV MEALS SNACK
1) Continue current regular general healthful diet order. Pt's food preferences obtained and honored in menu.

## 2018-04-18 NOTE — PROGRESS NOTE ADULT - ASSESSMENT
67 M with a PMH of HTN, HLD, CAD, diastolic dysfunction,  s/p MV annuloplasty, A fib on coumadin, s/p recent hospitalization for SOB with draiange of R pleural effusion, now returning with enterorhino virus infection and SOB with a persistent loculated R pleural effusion mildly increased in size.     Recurring R pleural Effusion:  - Pt seen by thoracic surgery, had VATs 4/13  - Currently saturating well on RA  -  Duonebs Q6H  -  Symbicort BID  - Chest PT, incentive spirometry/ acapella Q6H  -AC held for OR  4/13  EPS-Robbin-"no coumadin"

## 2018-04-18 NOTE — PROGRESS NOTE ADULT - PROBLEM SELECTOR PLAN 1
s/p thorocentesis by--radiology   prior pleural -cyto-negative prior  CTS Sue to taken to OR for VATS bx 4/13--await path  CT as per CTS

## 2018-04-18 NOTE — PROGRESS NOTE ADULT - PROBLEM SELECTOR PLAN 1
Rt posteriar chest tube removed cxr f/u   Check CXR in AM.   Will DC chest tube in AM if CXR stable and with minimal drainage.  Cough and deep breathe, Incentive Spirometry Q1h, Chest PT.  OOB to chair, Ambulate 4x daily  Continue with management per primary team, will follow.

## 2018-04-18 NOTE — DIETITIAN INITIAL EVALUATION ADULT. - OTHER INFO
Pt seen for LOS admission. Pt reports good po intake/appetite at this time, denies GI distress no N+V, no abd. pain/constipation/diarrhea with last BM yesterday 4/17/18. Pt denies chewing/swallowing difficulty, denies food allergies/intolerance. Pt has been following a hrgon-euo-mjywz vegetarian diet for ~6 months for weight loss and general health purposes and was able to successfully lose ~24 pounds as a result. Pt voices goal weight at around 210 pounds. Pt exhibits good understanding of general healthful diet with adequate intake of protein, vegetables, and carbohydrate food sources. Reports lowering sodium intake by eating less processed foods and wife preparing meals low in sodium. Denies need for further diet education at this time.

## 2018-04-18 NOTE — DIETITIAN INITIAL EVALUATION ADULT. - NS AS NUTRI INTERV ED CONTENT
Pt exhibits good understanding of general healthful diet, has been actively eating well at home and has achieved 24 pound weight loss over the last 6 months. Pt declined further verbal/written material diet education at this time. Commended pt's progress. Pt made aware RD remains available as needed: Hazel Pabon MS, RDN, CDN, CDE. #[phone]

## 2018-04-19 VITALS — WEIGHT: 229.5 LBS

## 2018-04-19 LAB
ANION GAP SERPL CALC-SCNC: 12 MMOL/L — SIGNIFICANT CHANGE UP (ref 5–17)
APTT BLD: 30.2 SEC — SIGNIFICANT CHANGE UP (ref 27.5–37.4)
BUN SERPL-MCNC: 10 MG/DL — SIGNIFICANT CHANGE UP (ref 7–23)
CALCIUM SERPL-MCNC: 9.2 MG/DL — SIGNIFICANT CHANGE UP (ref 8.4–10.5)
CHLORIDE SERPL-SCNC: 105 MMOL/L — SIGNIFICANT CHANGE UP (ref 96–108)
CO2 SERPL-SCNC: 24 MMOL/L — SIGNIFICANT CHANGE UP (ref 22–31)
CREAT SERPL-MCNC: 0.75 MG/DL — SIGNIFICANT CHANGE UP (ref 0.5–1.3)
GLUCOSE SERPL-MCNC: 96 MG/DL — SIGNIFICANT CHANGE UP (ref 70–99)
HCT VFR BLD CALC: 27.9 % — LOW (ref 39–50)
HGB BLD-MCNC: 9.3 G/DL — LOW (ref 13–17)
INR BLD: 1.07 RATIO — SIGNIFICANT CHANGE UP (ref 0.88–1.16)
MCHC RBC-ENTMCNC: 28.3 PG — SIGNIFICANT CHANGE UP (ref 27–34)
MCHC RBC-ENTMCNC: 33.3 GM/DL — SIGNIFICANT CHANGE UP (ref 32–36)
MCV RBC AUTO: 84.8 FL — SIGNIFICANT CHANGE UP (ref 80–100)
PLATELET # BLD AUTO: 323 K/UL — SIGNIFICANT CHANGE UP (ref 150–400)
POTASSIUM SERPL-MCNC: 3.8 MMOL/L — SIGNIFICANT CHANGE UP (ref 3.5–5.3)
POTASSIUM SERPL-SCNC: 3.8 MMOL/L — SIGNIFICANT CHANGE UP (ref 3.5–5.3)
PROTHROM AB SERPL-ACNC: 12.1 SEC — SIGNIFICANT CHANGE UP (ref 10–13.1)
RBC # BLD: 3.29 M/UL — LOW (ref 4.2–5.8)
RBC # FLD: 13.6 % — SIGNIFICANT CHANGE UP (ref 10.3–14.5)
SODIUM SERPL-SCNC: 141 MMOL/L — SIGNIFICANT CHANGE UP (ref 135–145)
WBC # BLD: 5.24 K/UL — SIGNIFICANT CHANGE UP (ref 3.8–10.5)
WBC # FLD AUTO: 5.24 K/UL — SIGNIFICANT CHANGE UP (ref 3.8–10.5)

## 2018-04-19 PROCEDURE — 94640 AIRWAY INHALATION TREATMENT: CPT

## 2018-04-19 PROCEDURE — 86900 BLOOD TYPING SEROLOGIC ABO: CPT

## 2018-04-19 PROCEDURE — 71046 X-RAY EXAM CHEST 2 VIEWS: CPT

## 2018-04-19 PROCEDURE — 82330 ASSAY OF CALCIUM: CPT

## 2018-04-19 PROCEDURE — 71045 X-RAY EXAM CHEST 1 VIEW: CPT

## 2018-04-19 PROCEDURE — 93308 TTE F-UP OR LMTD: CPT

## 2018-04-19 PROCEDURE — 87040 BLOOD CULTURE FOR BACTERIA: CPT

## 2018-04-19 PROCEDURE — 84295 ASSAY OF SERUM SODIUM: CPT

## 2018-04-19 PROCEDURE — 80053 COMPREHEN METABOLIC PANEL: CPT

## 2018-04-19 PROCEDURE — C1729: CPT

## 2018-04-19 PROCEDURE — 87633 RESP VIRUS 12-25 TARGETS: CPT

## 2018-04-19 PROCEDURE — 82565 ASSAY OF CREATININE: CPT

## 2018-04-19 PROCEDURE — P9045: CPT

## 2018-04-19 PROCEDURE — 87798 DETECT AGENT NOS DNA AMP: CPT

## 2018-04-19 PROCEDURE — 71045 X-RAY EXAM CHEST 1 VIEW: CPT | Mod: 26

## 2018-04-19 PROCEDURE — 82947 ASSAY GLUCOSE BLOOD QUANT: CPT

## 2018-04-19 PROCEDURE — 71250 CT THORAX DX C-: CPT

## 2018-04-19 PROCEDURE — 99232 SBSQ HOSP IP/OBS MODERATE 35: CPT

## 2018-04-19 PROCEDURE — 80048 BASIC METABOLIC PNL TOTAL CA: CPT

## 2018-04-19 PROCEDURE — 83605 ASSAY OF LACTIC ACID: CPT

## 2018-04-19 PROCEDURE — 87070 CULTURE OTHR SPECIMN AEROBIC: CPT

## 2018-04-19 PROCEDURE — 82435 ASSAY OF BLOOD CHLORIDE: CPT

## 2018-04-19 PROCEDURE — 85730 THROMBOPLASTIN TIME PARTIAL: CPT

## 2018-04-19 PROCEDURE — 86901 BLOOD TYPING SEROLOGIC RH(D): CPT

## 2018-04-19 PROCEDURE — C1769: CPT

## 2018-04-19 PROCEDURE — 86923 COMPATIBILITY TEST ELECTRIC: CPT

## 2018-04-19 PROCEDURE — C1889: CPT

## 2018-04-19 PROCEDURE — 88331 PATH CONSLTJ SURG 1 BLK 1SPC: CPT

## 2018-04-19 PROCEDURE — 83735 ASSAY OF MAGNESIUM: CPT

## 2018-04-19 PROCEDURE — 82803 BLOOD GASES ANY COMBINATION: CPT

## 2018-04-19 PROCEDURE — 87075 CULTR BACTERIA EXCEPT BLOOD: CPT

## 2018-04-19 PROCEDURE — 84132 ASSAY OF SERUM POTASSIUM: CPT

## 2018-04-19 PROCEDURE — P9016: CPT

## 2018-04-19 PROCEDURE — 85014 HEMATOCRIT: CPT

## 2018-04-19 PROCEDURE — 36430 TRANSFUSION BLD/BLD COMPNT: CPT

## 2018-04-19 PROCEDURE — 85027 COMPLETE CBC AUTOMATED: CPT

## 2018-04-19 PROCEDURE — 99285 EMERGENCY DEPT VISIT HI MDM: CPT | Mod: 25

## 2018-04-19 PROCEDURE — 93005 ELECTROCARDIOGRAM TRACING: CPT

## 2018-04-19 PROCEDURE — 87581 M.PNEUMON DNA AMP PROBE: CPT

## 2018-04-19 PROCEDURE — 86850 RBC ANTIBODY SCREEN: CPT

## 2018-04-19 PROCEDURE — 85610 PROTHROMBIN TIME: CPT

## 2018-04-19 PROCEDURE — 84484 ASSAY OF TROPONIN QUANT: CPT

## 2018-04-19 PROCEDURE — 88305 TISSUE EXAM BY PATHOLOGIST: CPT

## 2018-04-19 PROCEDURE — 87486 CHLMYD PNEUM DNA AMP PROBE: CPT

## 2018-04-19 PROCEDURE — 84100 ASSAY OF PHOSPHORUS: CPT

## 2018-04-19 PROCEDURE — 96374 THER/PROPH/DIAG INJ IV PUSH: CPT

## 2018-04-19 RX ORDER — METOPROLOL TARTRATE 50 MG
0.5 TABLET ORAL
Qty: 30 | Refills: 0 | OUTPATIENT
Start: 2018-04-19 | End: 2018-05-18

## 2018-04-19 RX ORDER — GINKGO BILOBA 40 MG
1 CAPSULE ORAL
Qty: 0 | Refills: 0 | COMMUNITY

## 2018-04-19 RX ORDER — FUROSEMIDE 40 MG
1 TABLET ORAL
Qty: 0 | Refills: 0 | COMMUNITY

## 2018-04-19 RX ADMIN — BUDESONIDE AND FORMOTEROL FUMARATE DIHYDRATE 2 PUFF(S): 160; 4.5 AEROSOL RESPIRATORY (INHALATION) at 11:15

## 2018-04-19 RX ADMIN — TAMSULOSIN HYDROCHLORIDE 0.4 MILLIGRAM(S): 0.4 CAPSULE ORAL at 11:15

## 2018-04-19 RX ADMIN — Medication 81 MILLIGRAM(S): at 11:20

## 2018-04-19 RX ADMIN — ENOXAPARIN SODIUM 40 MILLIGRAM(S): 100 INJECTION SUBCUTANEOUS at 05:24

## 2018-04-19 RX ADMIN — FINASTERIDE 5 MILLIGRAM(S): 5 TABLET, FILM COATED ORAL at 11:14

## 2018-04-19 NOTE — PROGRESS NOTE ADULT - ATTENDING COMMENTS
as above-  s/p prior non-dx  thoro -- re-admit w/recurrent effusion for VATS  (DDX-asbestos related, lung CA, empyema, lymphoma)  VATS-4/13--await path;     ; optimal pain control post op d/w pt. CT as per CTS.  Bronchodilator rx as above   AC decision as per Robbin et al.    Chirag Flynn MD-Pulmonary   [phone] as above-  s/p prior non-dx  thoro -- re-admit w/recurrent effusion for VATS  (DDX-asbestos related, lung CA, empyema, lymphoma)  VATS-4/13--await path;     ; optimal pain control post op d/w pt.  Bronchodilator rx as above   AC decision as per Robbin et al.-no coumdin as per pt.  F/up appt 1-2 weeks    Chirag Flynn MD-Pulmonary   [phone]

## 2018-04-19 NOTE — PROGRESS NOTE ADULT - ASSESSMENT
67 M with a PMH of HTN, HLD, CAD, diastolic dysfunction,  s/p MV annuloplasty, A fib on coumadin, s/p recent hospitalization for SOB with draiange of R pleural effusion, now returning with enterorhino virus infection and SOB with a persistent loculated R pleural effusion mildly increased in size.     Recurring R pleural Effusion:  - Pt seen by thoracic surgery, had VATs 4/13  - Currently saturating well on RA  -  Duonebs Q6H  -  Symbicort BID  - Chest PT, incentive spirometry/ acapella Q6H  -AC held for OR  4/13  EPS-Robbin-"no coumadin" 67 M with a PMH of HTN, HLD, CAD, diastolic dysfunction,  s/p MV annuloplasty, A fib on coumadin, s/p recent hospitalization for SOB with draiange of R pleural effusion, now returning with enterorhino virus infection and SOB with a persistent loculated R pleural effusion mildly increased in size.     Recurring R pleural Effusion:  - Pt seen by thoracic surgery, had VATs 4/13  - Currently saturating well on RA  -  Duonebs Q6H  -  Symbicort BID  - Chest PT, incentive spirometry/ acapella Q6H  -AC held for OR  4/13  EPS-Robbin-"no coumadin"  CT removed 4/18

## 2018-04-19 NOTE — PROGRESS NOTE ADULT - SUBJECTIVE AND OBJECTIVE BOX
VITAL SIGNS    Vital Signs Last 24 Hrs  T(C): 36.7 (18 @ 05:26), Max: 36.8 (18 @ 20:05)  T(F): 98.1 (18 @ 05:26), Max: 98.2 (18 @ 20:05)  HR: 59 (18 @ 05:26) (59 - 70)  BP: 129/83 (18 @ 05:26) (111/64 - 130/77)  RR: 16 (18 @ 05:26) (16 - 18)  SpO2: 92% (18 @ 05:26) (92% - 94%)             @ 07:01  -   @ 07:00  --------------------------------------------------------  IN: 1140 mL / OUT: 2490 mL / NET: -1350 mL     @ 07:01  -   @ 12:37  --------------------------------------------------------  IN: 240 mL / OUT: 0 mL / NET: 240 mL      Daily Weight in k.1 (2018 06:29)  Admit Wt: Drug Dosing Weight  Height (cm): 177.8 (2018 05:34)  Weight (kg): 105.5 (2018 06:03)  BMI (kg/m2): 33.4 (2018 06:03)  BSA (m2): 2.23 (2018 06:03)  MEDICATIONS  acetaminophen   Tablet. 650 milliGRAM(s) Oral every 6 hours PRN  aspirin  chewable 81 milliGRAM(s) Oral daily  atorvastatin 20 milliGRAM(s) Oral at bedtime  buDESOnide  80 MICROgram(s)/formoterol 4.5 MICROgram(s) Inhaler 2 Puff(s) Inhalation two times a day  enoxaparin Injectable 40 milliGRAM(s) SubCutaneous every 24 hours  finasteride 5 milliGRAM(s) Oral daily  guaiFENesin    Syrup 100 milliGRAM(s) Oral every 6 hours PRN  HYDROmorphone  Injectable 0.5 milliGRAM(s) IV Push every 4 hours PRN  tamsulosin 0.4 milliGRAM(s) Oral daily      PHYSICAL EXAM  Subjective: NAD No SOB  Neurology: alert and oriented x 3, nonfocal, no gross deficits  CV : S1S2   right VATS site c/d/i  Lungs: CTA b/l  Abdomen: soft, NT,ND, ( )BM  :  voiding  Extremities:  -c/c/e     LABS  -    141  |  105  |  10  ----------------------------<  96  3.8   |  24  |  0.75    Ca    9.2      2018 06:06                                   9.3    5.24  )-----------( 323      ( 2018 07:30 )             27.9          PT/INR - ( 2018 07:34 )   PT: 12.1 sec;   INR: 1.07 ratio         PTT - ( 2018 07:34 )  PTT:30.2 sec       PAST MEDICAL & SURGICAL HISTORY:  Kidney stones  Hyperlipidemia  Hypertension  CAD (coronary artery disease)  H/O mitral valve replacement

## 2018-04-19 NOTE — PROGRESS NOTE ADULT - PROBLEM SELECTOR PLAN 1
CXR WNL , clear from Thoracic standpoint for discharge. Pt to follow up with   Dr Rajinder Rogers in 2 weeks  Cough and deep breathe, Incentive Spirometry Q1h, Chest PT.  OOB to chair, Ambulate 4x daily  Continue with management per primary team, will follow.

## 2018-04-19 NOTE — PROGRESS NOTE ADULT - SUBJECTIVE AND OBJECTIVE BOX
He is feeling well and has no chest pain, SOB, cough, fever or palps    EKG: SR  TELE: SR    MEDICATIONS  (STANDING):  aspirin  chewable 81 milliGRAM(s) Oral daily  atorvastatin 20 milliGRAM(s) Oral at bedtime  buDESOnide  80 MICROgram(s)/formoterol 4.5 MICROgram(s) Inhaler 2 Puff(s) Inhalation two times a day  enoxaparin Injectable 40 milliGRAM(s) SubCutaneous every 24 hours  finasteride 5 milliGRAM(s) Oral daily  tamsulosin 0.4 milliGRAM(s) Oral daily    MEDICATIONS  (PRN):  acetaminophen   Tablet. 650 milliGRAM(s) Oral every 6 hours PRN Mild Pain (1 - 3)  guaiFENesin    Syrup 100 milliGRAM(s) Oral every 6 hours PRN Cough  HYDROmorphone  Injectable 0.5 milliGRAM(s) IV Push every 4 hours PRN Moderate Pain (4 - 6)      Allergies    penicillin (Flushing)    Intolerances      PAST MEDICAL & SURGICAL HISTORY:  Kidney stones  Hyperlipidemia  Hypertension  CAD (coronary artery disease)  H/O mitral valve replacement      Vital Signs Last 24 Hrs  T(C): 36.7 (19 Apr 2018 05:26), Max: 36.8 (18 Apr 2018 20:05)  T(F): 98.1 (19 Apr 2018 05:26), Max: 98.2 (18 Apr 2018 20:05)  HR: 59 (19 Apr 2018 05:26) (59 - 70)  BP: 129/83 (19 Apr 2018 05:26) (111/64 - 130/77)  BP(mean): --  RR: 16 (19 Apr 2018 05:26) (16 - 18)  SpO2: 92% (19 Apr 2018 05:26) (92% - 94%)    Physical Exam:  Constitutional: NAD, AAOx3  Cardiovascular: +S1S2 RRR  Pulmonary: CTA b/l, unlabored  Abd: soft NTND +BS  Extremities: no pedal edema, +distal pulses b/l  Neuro: non focal  LABS:                        9.3    5.24  )-----------( 323      ( 19 Apr 2018 07:30 )             27.9     04-19    141  |  105  |  10  ----------------------------<  96  3.8   |  24  |  0.75    Ca    9.2      19 Apr 2018 06:06      PT/INR - ( 19 Apr 2018 07:34 )   PT: 12.1 sec;   INR: 1.07 ratio         PTT - ( 19 Apr 2018 07:34 )  PTT:30.2 sec

## 2018-04-19 NOTE — PROGRESS NOTE ADULT - SKIN
No lesions; no rash

## 2018-04-19 NOTE — PROGRESS NOTE ADULT - CONSTITUTIONAL
Well-developed, well nourished

## 2018-04-19 NOTE — PROGRESS NOTE ADULT - PROVIDER SPECIALTY LIST ADULT
CT Surgery
Electrophysiology
Hospitalist
Infectious Disease
Infectious Disease
Pulmonology
Thoracic Surgery
Infectious Disease
Pulmonology

## 2018-04-19 NOTE — CHART NOTE - NSCHARTNOTEFT_GEN_A_CORE
Spoke with  , asked to facilitate patient discharge home. Medication reconciliation reviewed, revised and resolved with   who has medically cleared patient for discharge with follow up advised  - cleared by / CT Sx for D/C with f/u in 1-2 days

## 2018-04-19 NOTE — PROGRESS NOTE ADULT - GASTROINTESTINAL
Soft, non-tender, no hepatosplenomegaly, normal bowel sounds

## 2018-04-19 NOTE — PROGRESS NOTE ADULT - SUBJECTIVE AND OBJECTIVE BOX
CHIEF COMPLAINT:    Interval Events:    REVIEW OF SYSTEMS:  Constitutional: No fevers or chills. No weight loss. No fatigue or generalized malaise.  Eyes: No itching or discharge from the eyes  ENT: No ear pain. No ear discharge. No nasal congestion. No post nasal drip. No epistaxis. No throat pain. No sore throat. No difficulty swallowing.   CV: No chest pain. No palpitations. No lightheadedness or dizziness.   Resp: No dyspnea at rest. No dyspnea on exertion. No orthopnea. No wheezing. No cough. No stridor. No sputum production. No chest pain with respiration.  GI: No nausea. No vomiting. No diarrhea.  MSK: No joint pain or pain in any extremities  Integumentary: No skin lesions. No pedal edema.  Neurological: No gross motor weakness. No sensory changes.  [ ] All other systems negative  [ ] Unable to assess ROS because ________    OBJECTIVE:  ICU Vital Signs Last 24 Hrs  T(C): 36.8 (18 Apr 2018 20:05), Max: 36.8 (18 Apr 2018 20:05)  T(F): 98.2 (18 Apr 2018 20:05), Max: 98.2 (18 Apr 2018 20:05)  HR: 70 (18 Apr 2018 20:05) (65 - 70)  BP: 111/64 (18 Apr 2018 20:05) (111/64 - 130/77)  BP(mean): --  ABP: --  ABP(mean): --  RR: 18 (18 Apr 2018 20:05) (18 - 18)  SpO2: 94% (18 Apr 2018 20:05) (92% - 94%)        04-17 @ 07:01  -  04-18 @ 07:00  --------------------------------------------------------  IN: 600 mL / OUT: 2200 mL / NET: -1600 mL    04-18 @ 07:01  -  04-19 @ 05:04  --------------------------------------------------------  IN: 1140 mL / OUT: 1740 mL / NET: -600 mL      CAPILLARY BLOOD GLUCOSE          PHYSICAL EXAM:  General: Awake, alert, oriented X 3.   HEENT: Atraumatic, normocephalic.                 Mallampatti Grade                 No nasal congestion.                No tonsillar or pharyngeal exudates.  Lymph Nodes: No palpable lymphadenopathy  Neck: No JVD. No carotid bruit.   Respiratory: Normal chest expansion                         Normal percussion                         Normal and equal air entry                         No wheeze, rhonchi or rales.  Cardiovascular: S1 S2 normal. No murmurs, rubs or gallops.   Abdomen: Soft, non-tender, non-distended. No organomegaly.  Extremities: Warm to touch. Peripheral pulse palpable. No pedal edema.   Skin: No rashes or skin lesions  Neurological: Motor and sensory examination equal and normal in all four extremities.  Psychiatry: Appropriate mood and affect.    HOSPITAL MEDICATIONS:  MEDICATIONS  (STANDING):  aspirin  chewable 81 milliGRAM(s) Oral daily  atorvastatin 20 milliGRAM(s) Oral at bedtime  buDESOnide  80 MICROgram(s)/formoterol 4.5 MICROgram(s) Inhaler 2 Puff(s) Inhalation two times a day  enoxaparin Injectable 40 milliGRAM(s) SubCutaneous every 24 hours  finasteride 5 milliGRAM(s) Oral daily  tamsulosin 0.4 milliGRAM(s) Oral daily    MEDICATIONS  (PRN):  acetaminophen   Tablet. 650 milliGRAM(s) Oral every 6 hours PRN Mild Pain (1 - 3)  guaiFENesin    Syrup 100 milliGRAM(s) Oral every 6 hours PRN Cough  HYDROmorphone  Injectable 0.5 milliGRAM(s) IV Push every 4 hours PRN Moderate Pain (4 - 6)      LABS:                        8.5    5.28  )-----------( 320      ( 18 Apr 2018 07:40 )             25.8     04-18    141  |  104  |  11  ----------------------------<  100<H>  4.0   |  26  |  0.78    Ca    9.4      18 Apr 2018 05:19      PT/INR - ( 18 Apr 2018 07:40 )   PT: 12.3 sec;   INR: 1.09 ratio         PTT - ( 18 Apr 2018 07:40 )  PTT:30.0 sec          MICROBIOLOGY:     RADIOLOGY:  [ ] Reviewed and interpreted by me    Point of Care Ultrasound Findings:    PFT:    EKG: CHIEF COMPLAINT: good over all-no sob -still minimal cough; some chest pain    Interval Events: CT out    REVIEW OF SYSTEMS:  Constitutional: No fevers or chills. No weight loss. No fatigue or generalized malaise.  Eyes: No itching or discharge from the eyes  ENT: No ear pain. No ear discharge. No nasal congestion. No post nasal drip. No epistaxis. No throat pain. No sore throat. No difficulty swallowing.   CV: No chest pain. No palpitations. No lightheadedness or dizziness.   Resp: No dyspnea at rest. No dyspnea on exertion. No orthopnea. No wheezing. + cough. No stridor. No sputum production. No chest pain with respiration.  GI: No nausea. No vomiting. No diarrhea.  MSK: No joint pain or pain in any extremities  Integumentary: No skin lesions. No pedal edema.  Neurological: No gross motor weakness. No sensory changes.  [+ ] All other systems negative  [ ] Unable to assess ROS because ________    OBJECTIVE:  ICU Vital Signs Last 24 Hrs  T(C): 36.8 (18 Apr 2018 20:05), Max: 36.8 (18 Apr 2018 20:05)  T(F): 98.2 (18 Apr 2018 20:05), Max: 98.2 (18 Apr 2018 20:05)  HR: 70 (18 Apr 2018 20:05) (65 - 70)  BP: 111/64 (18 Apr 2018 20:05) (111/64 - 130/77)  BP(mean): --  ABP: --  ABP(mean): --  RR: 18 (18 Apr 2018 20:05) (18 - 18)  SpO2: 94% (18 Apr 2018 20:05) (92% - 94%)        04-17 @ 07:01  -  04-18 @ 07:00  --------------------------------------------------------  IN: 600 mL / OUT: 2200 mL / NET: -1600 mL    04-18 @ 07:01  -  04-19 @ 05:04  --------------------------------------------------------  IN: 1140 mL / OUT: 1740 mL / NET: -600 mL      CAPILLARY BLOOD GLUCOSE          PHYSICAL EXAM: NAD in bed  General: Awake, alert, oriented X 3.   HEENT: Atraumatic, normocephalic.                 Mallampatti Grade                 No nasal congestion.                No tonsillar or pharyngeal exudates.  Lymph Nodes: No palpable lymphadenopathy  Neck: No JVD. No carotid bruit.   Respiratory: Normal chest expansion-midly reduced BS right base                         Normal percussion                         Normal and equal air entry                         No wheeze, rhonchi or rales.  Cardiovascular: S1 S2 normal. No murmurs, rubs or gallops.   Abdomen: Soft, non-tender, non-distended. No organomegaly.  Extremities: Warm to touch. Peripheral pulse palpable. No pedal edema.   Skin: No rashes or skin lesions  Neurological: Motor and sensory examination equal and normal in all four extremities.  Psychiatry: Appropriate mood and affect.    HOSPITAL MEDICATIONS:  MEDICATIONS  (STANDING):  aspirin  chewable 81 milliGRAM(s) Oral daily  atorvastatin 20 milliGRAM(s) Oral at bedtime  buDESOnide  80 MICROgram(s)/formoterol 4.5 MICROgram(s) Inhaler 2 Puff(s) Inhalation two times a day  enoxaparin Injectable 40 milliGRAM(s) SubCutaneous every 24 hours  finasteride 5 milliGRAM(s) Oral daily  tamsulosin 0.4 milliGRAM(s) Oral daily    MEDICATIONS  (PRN):  acetaminophen   Tablet. 650 milliGRAM(s) Oral every 6 hours PRN Mild Pain (1 - 3)  guaiFENesin    Syrup 100 milliGRAM(s) Oral every 6 hours PRN Cough  HYDROmorphone  Injectable 0.5 milliGRAM(s) IV Push every 4 hours PRN Moderate Pain (4 - 6)      LABS:                        8.5    5.28  )-----------( 320      ( 18 Apr 2018 07:40 )             25.8     04-18    141  |  104  |  11  ----------------------------<  100<H>  4.0   |  26  |  0.78    Ca    9.4      18 Apr 2018 05:19      PT/INR - ( 18 Apr 2018 07:40 )   PT: 12.3 sec;   INR: 1.09 ratio         PTT - ( 18 Apr 2018 07:40 )  PTT:30.0 sec          MICROBIOLOGY:     RADIOLOGY:  [ ] Reviewed and interpreted by me    Point of Care Ultrasound Findings:    PFT:    EKG:

## 2018-04-19 NOTE — PROGRESS NOTE ADULT - NS NEC GEN PE MLT EXAM PC
No bruits; no thyromegaly or nodules

## 2018-04-19 NOTE — PROGRESS NOTE ADULT - PROBLEM SELECTOR PROBLEM 7
Asbestos exposure

## 2018-04-19 NOTE — PROGRESS NOTE ADULT - ASSESSMENT
67M with PMHx of Afib on Coumadin, HTN, CAD s/p CABG, MVR, admitted for progressive BROOKS s/p Rt thoracentesis 2 weeks ago. Pt s/p Chest CT today with reoccurrence of small right pleural effusion without enhancement to suggest empyema and adjacent partial right lower lobe atelectasis. Right lung with extensive loculation and hemothorax. Underwent 4/13 Decortication was performed. Pleural biopsies taken.   4/14: Stable for transfer to floor-2 DSU  4/15: Awaiting biopsy results from OR specimen. Continue CT to wall suction today. Cxr in am  4/16 Chest tubes placed to water seal today. Output 20ml/24h in both.   4/17 Rt anterior (#1 chest tube removed)  remaining posterior  tube on seal   4/19 chest x ray, no ptx trace right  effusion

## 2018-04-19 NOTE — PROGRESS NOTE ADULT - ASSESSMENT
He has remained in SR and agree with discontinuing coumadin  Will get follow monitoring as outpatient  Continue on low dose beta blocker  Will see in follow up next week.

## 2018-04-23 ENCOUNTER — APPOINTMENT (OUTPATIENT)
Dept: THORACIC SURGERY | Facility: CLINIC | Age: 68
End: 2018-04-23
Payer: MEDICARE

## 2018-04-23 VITALS
WEIGHT: 231 LBS | SYSTOLIC BLOOD PRESSURE: 112 MMHG | HEIGHT: 70 IN | DIASTOLIC BLOOD PRESSURE: 72 MMHG | BODY MASS INDEX: 33.07 KG/M2 | RESPIRATION RATE: 15 BRPM | HEART RATE: 61 BPM | TEMPERATURE: 98 F

## 2018-04-23 DIAGNOSIS — Z09 ENCOUNTER FOR FOLLOW-UP EXAMINATION AFTER COMPLETED TREATMENT FOR CONDITIONS OTHER THAN MALIGNANT NEOPLASM: ICD-10-CM

## 2018-04-23 PROCEDURE — 99024 POSTOP FOLLOW-UP VISIT: CPT

## 2018-04-25 ENCOUNTER — APPOINTMENT (OUTPATIENT)
Dept: ELECTROPHYSIOLOGY | Facility: CLINIC | Age: 68
End: 2018-04-25
Payer: MEDICARE

## 2018-04-25 ENCOUNTER — NON-APPOINTMENT (OUTPATIENT)
Age: 68
End: 2018-04-25

## 2018-04-25 VITALS
HEIGHT: 70 IN | HEART RATE: 64 BPM | BODY MASS INDEX: 32.93 KG/M2 | OXYGEN SATURATION: 96 % | WEIGHT: 230 LBS | DIASTOLIC BLOOD PRESSURE: 77 MMHG | SYSTOLIC BLOOD PRESSURE: 119 MMHG

## 2018-04-25 PROCEDURE — 93000 ELECTROCARDIOGRAM COMPLETE: CPT

## 2018-04-25 PROCEDURE — 99214 OFFICE O/P EST MOD 30 MIN: CPT

## 2018-04-25 RX ORDER — FUROSEMIDE 20 MG/1
20 TABLET ORAL DAILY
Qty: 14 | Refills: 1 | Status: DISCONTINUED | COMMUNITY
Start: 2018-03-14 | End: 2018-04-25

## 2018-04-25 RX ORDER — WARFARIN 3 MG/1
3 TABLET ORAL
Qty: 90 | Refills: 0 | Status: DISCONTINUED | COMMUNITY
Start: 2017-03-19 | End: 2018-04-25

## 2018-04-25 RX ORDER — AMITRIPTYLINE HYDROCHLORIDE 10 MG/1
10 TABLET, FILM COATED ORAL
Qty: 1 | Refills: 5 | Status: DISCONTINUED | COMMUNITY
Start: 2018-04-25 | End: 2018-04-25

## 2018-04-25 RX ORDER — WARFARIN 1 MG/1
1 TABLET ORAL EVERY OTHER DAY
Qty: 8 | Refills: 0 | Status: DISCONTINUED | COMMUNITY
Start: 2017-01-18 | End: 2018-04-25

## 2018-04-26 ENCOUNTER — EMERGENCY (EMERGENCY)
Facility: HOSPITAL | Age: 68
LOS: 1 days | Discharge: HOME | End: 2018-04-26
Attending: EMERGENCY MEDICINE
Payer: MEDICARE

## 2018-04-26 VITALS
TEMPERATURE: 98 F | RESPIRATION RATE: 18 BRPM | WEIGHT: 229.94 LBS | SYSTOLIC BLOOD PRESSURE: 147 MMHG | HEART RATE: 63 BPM | DIASTOLIC BLOOD PRESSURE: 78 MMHG | OXYGEN SATURATION: 96 %

## 2018-04-26 VITALS
SYSTOLIC BLOOD PRESSURE: 122 MMHG | TEMPERATURE: 98 F | RESPIRATION RATE: 20 BRPM | OXYGEN SATURATION: 96 % | HEART RATE: 58 BPM | DIASTOLIC BLOOD PRESSURE: 76 MMHG

## 2018-04-26 DIAGNOSIS — Z95.4 PRESENCE OF OTHER HEART-VALVE REPLACEMENT: Chronic | ICD-10-CM

## 2018-04-26 LAB
ALBUMIN SERPL ELPH-MCNC: 3.5 G/DL — SIGNIFICANT CHANGE UP (ref 3.3–5)
ALP SERPL-CCNC: 63 U/L — SIGNIFICANT CHANGE UP (ref 40–120)
ALT FLD-CCNC: 29 U/L — SIGNIFICANT CHANGE UP (ref 10–45)
ANION GAP SERPL CALC-SCNC: 15 MMOL/L — SIGNIFICANT CHANGE UP (ref 5–17)
APTT BLD: 29.6 SEC — SIGNIFICANT CHANGE UP (ref 27.5–37.4)
AST SERPL-CCNC: 28 U/L — SIGNIFICANT CHANGE UP (ref 10–40)
BASOPHILS # BLD AUTO: 0 K/UL — SIGNIFICANT CHANGE UP (ref 0–0.2)
BASOPHILS NFR BLD AUTO: 0.5 % — SIGNIFICANT CHANGE UP (ref 0–2)
BILIRUB SERPL-MCNC: 0.1 MG/DL — LOW (ref 0.2–1.2)
BUN SERPL-MCNC: 16 MG/DL — SIGNIFICANT CHANGE UP (ref 7–23)
CALCIUM SERPL-MCNC: 9.1 MG/DL — SIGNIFICANT CHANGE UP (ref 8.4–10.5)
CHLORIDE SERPL-SCNC: 102 MMOL/L — SIGNIFICANT CHANGE UP (ref 96–108)
CO2 SERPL-SCNC: 22 MMOL/L — SIGNIFICANT CHANGE UP (ref 22–31)
CREAT SERPL-MCNC: 0.82 MG/DL — SIGNIFICANT CHANGE UP (ref 0.5–1.3)
EOSINOPHIL # BLD AUTO: 0.3 K/UL — SIGNIFICANT CHANGE UP (ref 0–0.5)
EOSINOPHIL NFR BLD AUTO: 4.7 % — SIGNIFICANT CHANGE UP (ref 0–6)
GLUCOSE SERPL-MCNC: 138 MG/DL — HIGH (ref 70–99)
HCT VFR BLD CALC: 29 % — LOW (ref 39–50)
HGB BLD-MCNC: 10 G/DL — LOW (ref 13–17)
INR BLD: 1.01 RATIO — SIGNIFICANT CHANGE UP (ref 0.88–1.16)
LYMPHOCYTES # BLD AUTO: 1.9 K/UL — SIGNIFICANT CHANGE UP (ref 1–3.3)
LYMPHOCYTES # BLD AUTO: 26.1 % — SIGNIFICANT CHANGE UP (ref 13–44)
MCHC RBC-ENTMCNC: 30.2 PG — SIGNIFICANT CHANGE UP (ref 27–34)
MCHC RBC-ENTMCNC: 34.5 GM/DL — SIGNIFICANT CHANGE UP (ref 32–36)
MCV RBC AUTO: 87.3 FL — SIGNIFICANT CHANGE UP (ref 80–100)
MONOCYTES # BLD AUTO: 0.5 K/UL — SIGNIFICANT CHANGE UP (ref 0–0.9)
MONOCYTES NFR BLD AUTO: 7.4 % — SIGNIFICANT CHANGE UP (ref 2–14)
NEUTROPHILS # BLD AUTO: 4.4 K/UL — SIGNIFICANT CHANGE UP (ref 1.8–7.4)
NEUTROPHILS NFR BLD AUTO: 61.3 % — SIGNIFICANT CHANGE UP (ref 43–77)
NT-PROBNP SERPL-SCNC: 735 PG/ML — HIGH (ref 0–300)
PLATELET # BLD AUTO: 387 K/UL — SIGNIFICANT CHANGE UP (ref 150–400)
POTASSIUM SERPL-MCNC: 3.7 MMOL/L — SIGNIFICANT CHANGE UP (ref 3.5–5.3)
POTASSIUM SERPL-SCNC: 3.7 MMOL/L — SIGNIFICANT CHANGE UP (ref 3.5–5.3)
PROT SERPL-MCNC: 7.3 G/DL — SIGNIFICANT CHANGE UP (ref 6–8.3)
PROTHROM AB SERPL-ACNC: 10.9 SEC — SIGNIFICANT CHANGE UP (ref 9.8–12.7)
RBC # BLD: 3.32 M/UL — LOW (ref 4.2–5.8)
RBC # FLD: 12.8 % — SIGNIFICANT CHANGE UP (ref 10.3–14.5)
SODIUM SERPL-SCNC: 139 MMOL/L — SIGNIFICANT CHANGE UP (ref 135–145)
TROPONIN T SERPL-MCNC: <0.01 NG/ML — SIGNIFICANT CHANGE UP (ref 0–0.06)
WBC # BLD: 7.2 K/UL — SIGNIFICANT CHANGE UP (ref 3.8–10.5)
WBC # FLD AUTO: 7.2 K/UL — SIGNIFICANT CHANGE UP (ref 3.8–10.5)

## 2018-04-26 PROCEDURE — 76604 US EXAM CHEST: CPT | Mod: 26

## 2018-04-26 PROCEDURE — 85027 COMPLETE CBC AUTOMATED: CPT

## 2018-04-26 PROCEDURE — 76604 US EXAM CHEST: CPT

## 2018-04-26 PROCEDURE — 80053 COMPREHEN METABOLIC PANEL: CPT

## 2018-04-26 PROCEDURE — 85730 THROMBOPLASTIN TIME PARTIAL: CPT

## 2018-04-26 PROCEDURE — 83880 ASSAY OF NATRIURETIC PEPTIDE: CPT

## 2018-04-26 PROCEDURE — 85610 PROTHROMBIN TIME: CPT

## 2018-04-26 PROCEDURE — 99284 EMERGENCY DEPT VISIT MOD MDM: CPT

## 2018-04-26 PROCEDURE — 84484 ASSAY OF TROPONIN QUANT: CPT

## 2018-04-26 PROCEDURE — 99284 EMERGENCY DEPT VISIT MOD MDM: CPT | Mod: GC

## 2018-04-26 PROCEDURE — 71046 X-RAY EXAM CHEST 2 VIEWS: CPT | Mod: 26

## 2018-04-26 PROCEDURE — 71046 X-RAY EXAM CHEST 2 VIEWS: CPT

## 2018-04-26 NOTE — ED ADULT NURSE NOTE - OBJECTIVE STATEMENT
67 year old male patient presents to ED c/o worsening SOB upon exertion x 2 days. Patient recently admitted 67 year old male patient presents to ED c/o worsening SOB upon exertion x 2 days. Patient recently discharged from [facility] following similar episode of SOB and recurrent pleural effusion, requiring VAT procedure and blood transfusion. Patient accompanied by son, who states patient is adamant about not being admitted to the hospital. Patient denies associated CP, chest tightness, dizziness, lightheadedness, abd pain, n/v/d, fever, chills. Patient well appearing and able to speak in full sentences without SOB. PT reporting SOB upon moving around in stretcher.

## 2018-04-26 NOTE — ED PROCEDURE NOTE - PROCEDURE ADDITIONAL DETAILS
POCUS: Emergency Department Focused Ultrasound performed at patient's bedside.  The complete report will be available in PACS.  Trace right pleural effusion

## 2018-04-26 NOTE — ED ADULT NURSE NOTE - FALL HARM RISK CONCLUSION
[facility] PHYSICAL REHABILITATION  [address] [place] [address], [address]     INPATIENT REHABILITATION  DAILY PROGRESS NOTE     Date: 4/26/2017    Name: Criss Mckenzie Age / Sex: 66 y.o. / male   CSN: [id number] MRN: [id number]   [facility] Date: 4/24/2017 Length of Stay: 2 days     Primary Rehab Diagnosis: Impaired Mobility and ADLs secondary to Acute Ischemic Stroke (acute to subacute ischemia involving the posterior limb of the left internal capsule, along the lateral aspect of the left thalamus) with residual right hemiparesis, dysphagia and dysarthria       Subjective:     Patient seen and examined. Patient's Complaint:   No significant medical complaints    Pain Control: no current joint or muscle symptoms, essentially pain-free      Objective:     Vital Signs:  Patient Vitals for the past 24 hrs:   BP Temp Pulse Resp SpO2 Height Weight   04/26/17 0741 155/87 97.5 °F (36.4 °C) 68 18 97 % - -   04/25/17 2010 110/69 97.6 °F (36.4 °C) 68 18 100 % - -   04/25/17 1602 147/87 97.6 °F (36.4 °C) 78 18 96 % - -   04/25/17 1552 - - - - - 5' 10\" (1.778 m) 81.5 kg (179 lb 10.8 oz)        Physical Examination:  GENERAL SURVEY: Patient is awake, alert, oriented x 3, sitting comfortably on the chair, not in acute respiratory distress. HEENT: pink palpebral conjunctivae, anicteric sclerae, no nasoaural discharge, moist oral mucosa  NECK: supple, no jugular venous distention, no palpable lymph nodes  CHEST/LUNGS: symmetrical chest expansion, good air entry, clear breath sounds  HEART: adynamic precordium, good S1 S2, no S3, regular rhythm, no murmurs  ABDOMEN: flat, bowel sounds appreciated, soft, non-tender  EXTREMITIES: pink nailbeds, no edema, full and equal pulses, no calf tenderness   NEUROLOGICAL EXAM: The patient is awake, alert and oriented x3, able to answer questions fairly appropriately, able to follow 1 and 2 step commands. Able to tell time from the wall clock.  Cranial nerves II-XII are grossly intact except for slurred speech and dysphagia. No gross sensory deficit. Motor strength is 4/5 on the RUE, 5/5 on the LUE, 4/5 on the right hip, 4+/5 on the right knee, 3/5 on the right ankle, 5/5 on the LLE. Current Medications:  Current Facility-Administered Medications   Medication Dose Route Frequency    cholecalciferol (VITAMIN D3) tablet 2,000 Units  2,000 Units Oral DAILY    bisacodyl (DULCOLAX) suppository 10 mg  10 mg Rectal Q48H PRN    heparin (porcine) injection 5,000 Units  5,000 Units SubCUTAneous Q12H    acetaminophen (TYLENOL) solution 650 mg  650 mg Oral Q4H PRN    docusate sodium (COLACE) capsule 100 mg  100 mg Oral BID    aspirin chewable tablet 81 mg  81 mg Oral DAILY WITH BREAKFAST    clopidogrel (PLAVIX) tablet 75 mg  75 mg Oral DAILY WITH DINNER    atorvastatin (LIPITOR) tablet 20 mg  20 mg Oral QHS    pantoprazole (PROTONIX) granules for oral suspension 40 mg  40 mg Oral ACB    multivitamin (MULTI-DELYN, WELLESSE) oral liquid 30 mL  30 mL Oral DAILY    levothyroxine (SYNTHROID) tablet 150 mcg  150 mcg Oral ACB       Allergies:   Allergies   Allergen Reactions    Pcn [Penicillins] Rash       Functional Progress:    PHYSICAL THERAPY    ON ADMISSION MOST RECENT   Wheelchair Mobility/Management  Able to Propel (ft): 70 feet (verbal cueing for technique)  Functional Level: 2  Curbs/Ramps Assist Required (FIM Score): 0 (Not tested)  Wheelchair Setup Assist Required : 2 (Maximal assistance)  Wheelchair Management: Manages left brake, Manages right brake Wheelchair Mobility/Management  Able to Propel (ft): 70 feet (verbal cueing for technique)  Functional Level: 2  Curbs/Ramps Assist Required (FIM Score): 0 (Not tested)  Wheelchair Setup Assist Required : 2 (Maximal assistance)  Wheelchair Management: Manages left brake, Manages right brake     Gait  Amount of Assistance: 2 (Maximal assistance) (2/2 R LOB)  Distance (ft): 5 Feet (ft)  Assistive Device: Gait belt Gait  Amount of Assistance: 2 (Maximal assistance) (2/2 R LOB)  Distance (ft): 5 Feet (ft)  Assistive Device: Gait belt     Balance-Sitting/Standing  Sitting - Static: Good (unsupported)  Sitting - Dynamic: Good (unsupported)  Standing - Static: Fair  Standing - Dynamic : Impaired Balance-Sitting/Standing  Sitting - Static: Good (unsupported)  Sitting - Dynamic: Good (unsupported)  Standing - Static: Fair  Standing - Dynamic : Impaired     Bed/Mat Mobility  Rolling Right : 5 (Supervision)  Rolling Left : 5 (Supervision)  Supine to Sit : 5 (Supervision)  Sit to Supine : 5 (Supervision) Bed/Mat Mobility  Rolling Right : 5 (Supervision)  Rolling Left : 5 (Supervision)  Supine to Sit : 5 (Supervision)  Sit to Supine : 5 (Supervision)     Transfers  Transfer Type: Other  Other: Patient performs stand step transfer with moderate assist from PT for anterior approach R LE knee block with loading 2/2 hx of buckling, trunk stability, pivot assist, and controlled lowering. Patient demonstrates increased momentum utilization for STS transfer and R genu recurvatum requiring verbal cueing to crrect these; pacing cues successful, however genu recurvatum requires tactile cueing with physical assist to prevent buckling. Patient demosntrates pass retract technique with R LE advancement as well as anterior COG placement to attempts to maintain TKE 2/2 functional quad weakness. Transfer Assistance : 3 (Moderate assistance )  Sit to Stand Assistance: Moderate assistance  Car Transfers: Not tested  Car Type: NA Transfers  Transfer Type: Other  Other: Patient performs stand step transfer with moderate assist from PT for anterior approach R LE knee block with loading 2/2 hx of buckling, trunk stability, pivot assist, and controlled lowering.  Patient demonstrates increased momentum utilization for STS transfer and R genu recurvatum requiring verbal cueing to crrect these; pacing cues successful, however genu recurvatum requires tactile cueing with physical assist to prevent buckling. Patient demosntrates pass retract technique with R LE advancement as well as anterior COG placement to attempts to maintain TKE 2/2 functional quad weakness. Transfer Assistance : 3 (Moderate assistance )  Sit to Stand Assistance: Moderate assistance  Car Transfers: Not tested  Car Type: NA     Steps or Stairs  Steps/Stairs Ambulated (#): 0  Level of Assist : 0 (Not tested)  Rail Use:  (NA) Steps or Stairs  Steps/Stairs Ambulated (#): 0  Level of Assist : 0 (Not tested)  Rail Use:  (NA)         Lab/Data Review:  No results found for this or any previous visit (from the past 24 hour(s)). Estimated Glomerular Filtration Rate:  On admission, based on a Creatinine of 1.44 mg/dl:   Estimated GFR using CKD-EPI = 46.2 mL/min/1.73m2   Estimated GFR using MDRD = 50.4 mL/min/1.73m2      Assessment:     Primary Rehabilitation Diagnosis  1. Impaired Mobility and ADLs  2. Acute Ischemic Stroke (acute to subacute ischemia involving the posterior limb of the left internal capsule, along the lateral aspect of the left thalamus) with residual right hemiparesis, dysphagia and dysarthria      Comorbidities   Urinary incontinence    History of malignant neoplasm of prostate    Hypothyroidism    Chronic obstructive pulmonary disease (COPD)    Asbestosis    Osteoarthrosis involving multiple sites    History of endogenous hypertriglyceridemia    CKD (chronic kidney disease) stage 3, GFR 30-59 ml/min    Dyslipidemia (high LDL; low HDL)    Gastroesophageal reflux disease    Procedure refused    Erectile dysfunction       Plan:     1. Justification for continued stay: Good progression towards established rehabilitation goals. 2. Medical Issues being followed closely:    [x]  Fall and safety precautions     []  Wound Care     [x]  Bowel and Bladder Function     [x]  Fluid Electrolyte and Nutrition Balance     []  Pain Control      3.  Issues that 24 hour rehabilitation nursing is following:    [x]  Fall and safety precautions     []  Wound Care     [x]  Bowel and Bladder Function     [x]  Fluid Electrolyte and Nutrition Balance     []  Pain Control      [x]  Assistance with and education on in-room safety with transfers to and from the bed, wheelchair, toilet and shower. 4. Acute rehabilitation plan of care:    [x]  Continue current care and rehab. [x]  Physical Therapy           [x]  Occupational Therapy           [x]  Speech Therapy     []  Hold Rehab until further notice     5. Medications:    [x]  MAR Reviewed     [x]  Continue Present Medications     6. DVT Prophylaxis:      []  Lovenox     [x]  Unfractionated Heparin     []  Coumadin     []  NOAC     [x]  GRIS Stockings     [x]  Sequential Compression Device     []  None     7. Orders:   > Acute Ischemic Stroke (acute to subacute ischemia involving the posterior limb of the left internal capsule, along the lateral aspect of the left thalamus) with residual right hemiparesis, dysphagia and dysarthria    > Continue:    > Aspirin 81 mg PO once daily with breakfast    > Atorvastatin 20 mg PO q HS    > Clopidogrel 75 mg PO once daily with dinner     > Decreased calculated GFR   > On admission, based on a Creatinine of 1.4 mg/dl:    > Estimated GFR using CKD-EPI = 47.8 mL/min/1.73m2    > Estimated GFR using MDRD = 52.1 mL/min/1.73m2   > Estimated calculated glomerular filtration rate equivalent to that of stage 3 chronic kidney disease = 30-59 ml/min   > Urine Microalbumin/Creatinine ordered     > Dyslipidemia   > Lipid profile (4/19/2017): , . HDL 42, LDL 97   > On 4/20/2017, patient was started at [facility] on Atorvastatin 20 mg PO q HS   > Continue Atorvastatin 20 mg PO q HS      8. Patient's progress in rehabilitation and medical issues discussed with the patient. All questions answered to the best of my ability. Care plan discussed with patient and nurse.       Signed:    Berlin Mohs, MD    April 26, 2017 Universal Safety Interventions

## 2018-04-26 NOTE — CONSULT NOTE ADULT - ASSESSMENT
67 year old man with small recurrent right pleural effusion s/p recent VATS/decortication/pleural biopsy showing fibrosis (4/13).    - No need for hospital admission at this time from thoracic surgery standpoint; patient with unlabored breathing and sating well on room air; patient comfortable going home and returning to ED if worsening symptoms  - Patient should call Dr. Rogers's office for a chest xray and appointment in 2 weeks  - Discussed with ED attending and with Dr. Sue French MD PGY3 #[id number]

## 2018-04-26 NOTE — CONSULT NOTE ADULT - SUBJECTIVE AND OBJECTIVE BOX
Thoracic Surgery Consult  Consulting surgical team: Thoracic  Consulting attending: Dr. Rogers    HPI: 67 year old man with medical history of Afib on Coumadin, HTN, CAD s/p CABG, MVR with admitted 4/10 - 4/19 with right pleural effusion /empyema s/p right VATS, decortication, pleural biopsy (4/13) showing fibrosis presents today with shortness of breath.  No edema.  No chest pain.  No fevers/chills, nausea or vomiting.  His says that his shortness of breath has been intermittent since his VATS but was worse today.  Patient feels better since being in the ED.  Chest xray and right lung ultrasound show a small pleural effusion, no pneumothorax.        PAST MEDICAL HISTORY:  Kidney stones  Hyperlipidemia  Hypertension  CAD (coronary artery disease)  Afib    PAST SURGICAL HISTORY:  H/O mitral valve replacement  CABG  Right VATS, decortication    ALLERGIES:  penicillin ([place])    SOCIAL HISTORY: , nonsmoker    FAMILY HISTORY: No significant medical history in first degree relatives    VITALS & I/Os:  Vital Signs Last 24 Hrs  T(C): 36.9 (26 Apr 2018 07:31), Max: 37.3 (26 Apr 2018 05:53)  T(F): 98.4 (26 Apr 2018 07:31), Max: 99.2 (26 Apr 2018 05:53)  HR: 57 (26 Apr 2018 07:31) (57 - 63)  BP: 108/74 (26 Apr 2018 07:31) (108/74 - 147/78)  BP(mean): --  RR: 20 (26 Apr 2018 07:31) (18 - 20)  SpO2: 97% (26 Apr 2018 07:31) (96% - 97%)    PHYSICAL EXAM:  General: No acute distress, appears nontoxic  Respiratory: Breathing unlabored  Cardiovascular: irregularly irregular  Abdominal: Soft, nondistended, nontender  Extremities: Warm, well perfused, no edema    LABS:                        10.0   7.2   )-----------( 387      ( 26 Apr 2018 06:23 )             29.0     04-26    139  |  102  |  16  ----------------------------<  138<H>  3.7   |  22  |  0.82    Ca    9.1      26 Apr 2018 06:23    TPro  7.3  /  Alb  3.5  /  TBili  0.1<L>  /  DBili  x   /  AST  28  /  ALT  29  /  AlkPhos  63  04-26    Lactate:    PT/INR - ( 26 Apr 2018 06:23 )   PT: 10.9 sec;   INR: 1.01 ratio         PTT - ( 26 Apr 2018 06:23 )  PTT:29.6 sec    CARDIAC MARKERS ( 26 Apr 2018 06:23 )  x     / <0.01 ng/mL / x     / x     / x                IMAGING:

## 2018-04-26 NOTE — ED ADULT NURSE REASSESSMENT NOTE - NS ED NURSE REASSESS COMMENT FT1
Received report from PM RN, Jonelle. Patient resting comfortably in room with son at bedside, no complaints at this time, VSS. Patient requesting to discuss results with doctor and requesting discharge. MD notified

## 2018-04-26 NOTE — ED PROVIDER NOTE - ATTENDING CONTRIBUTION TO CARE
66y/o M with h/o CAD HLD HTN, recent VATS for drainage of recurrent hemothorax, presenting with shortness of breath, and wanting CXR, labs; patient appears inNAD, afebrile, Lungs CTABL, cardiac: nrl s1s2, Abd soft nt/nd, no peripheral edema.  CXR obtained showing small right pleural effusion; also seen on US.  Spoke with CT surgery service who came and evaluated patient, recommended no intervention for effusion at this time.  Patient offered admission for further w/u of dyspnea, but refused, will see pcp and pulmonologist as outpatient as soon as possible.  will dc.

## 2018-04-26 NOTE — ED PROVIDER NOTE - PROGRESS NOTE DETAILS
maru - pt endorsed to me at sign oput -- pt feels well unwilling to stay in ed pocus w small effusiopn - await thoracic surg davianal fredrick munoz w outpt fu surgery resident saw patient; spoke with Dr Rogers, no interventions from their perspective, trace to small pleural effusion.  d/w patient options for additional w/u of his SOB, but patient declining admission or further testing at this time, is stable for dc. Lisa

## 2018-04-26 NOTE — ED PROVIDER NOTE - OBJECTIVE STATEMENT
Tammi Munguia M.D: 67M hx cad, htn, hld, recurrent right sided pleural effusion s/p VATS w/ Dr Rogers 1 week ago p/w 3 days worsening SOB similar in presentation to prior effusions. no cp no fc no cough. no other associated symptoms. no dizziness/lightheadedness.

## 2018-04-26 NOTE — ED PROVIDER NOTE - PHYSICAL EXAMINATION
Tammi Munguia M.D.:   patient awake alert NAD .   LUNGS CTAB no wheeze no crackle.   CARD RRR no m/r/g.    Abdomen soft NT ND no rebound no guarding no CVA tenderness.   EXT WWP no edema no calf tenderness CV 2+DP/PT bilaterally.   neuro A&Ox3 gait normal.    skin warm and dry no rash  HEENT: moist mucous membranes, PERRL, EOMI

## 2018-04-26 NOTE — ED PROVIDER NOTE - MEDICAL DECISION MAKING DETAILS
67M p/w progressively worsening SOB- similar in quality to prior symptomatic effusions. pt in no significant respiratory distress upon exam and saturating well on room air. suspect recurrent effusion. for labs, cxr, thoracic surgery eval and disposition per thoracic surgery.

## 2018-05-19 LAB
CULTURE RESULTS: SIGNIFICANT CHANGE UP
SPECIMEN SOURCE: SIGNIFICANT CHANGE UP

## 2018-05-29 ENCOUNTER — RX RENEWAL (OUTPATIENT)
Age: 68
End: 2018-05-29

## 2018-06-15 ENCOUNTER — APPOINTMENT (OUTPATIENT)
Dept: PULMONOLOGY | Facility: CLINIC | Age: 68
End: 2018-06-15

## 2018-07-23 ENCOUNTER — APPOINTMENT (OUTPATIENT)
Dept: THORACIC SURGERY | Facility: CLINIC | Age: 68
End: 2018-07-23
Payer: MEDICARE

## 2018-07-23 VITALS
SYSTOLIC BLOOD PRESSURE: 114 MMHG | DIASTOLIC BLOOD PRESSURE: 75 MMHG | BODY MASS INDEX: 35.22 KG/M2 | RESPIRATION RATE: 15 BRPM | TEMPERATURE: 98.7 F | OXYGEN SATURATION: 97 % | HEART RATE: 62 BPM | WEIGHT: 246 LBS | HEIGHT: 70 IN

## 2018-07-23 PROCEDURE — 99213 OFFICE O/P EST LOW 20 MIN: CPT

## 2018-07-23 RX ORDER — OMEGA-3-ACID ETHYL ESTERS 1 G/1
CAPSULE, LIQUID FILLED ORAL DAILY
Refills: 0 | Status: ACTIVE | COMMUNITY

## 2018-07-25 ENCOUNTER — APPOINTMENT (OUTPATIENT)
Dept: ELECTROPHYSIOLOGY | Facility: CLINIC | Age: 68
End: 2018-07-25
Payer: MEDICARE

## 2018-07-25 ENCOUNTER — NON-APPOINTMENT (OUTPATIENT)
Age: 68
End: 2018-07-25

## 2018-07-25 VITALS
BODY MASS INDEX: 35.5 KG/M2 | SYSTOLIC BLOOD PRESSURE: 112 MMHG | OXYGEN SATURATION: 97 % | WEIGHT: 248 LBS | DIASTOLIC BLOOD PRESSURE: 71 MMHG | HEIGHT: 70 IN

## 2018-07-25 DIAGNOSIS — Z98.890 OTHER SPECIFIED POSTPROCEDURAL STATES: ICD-10-CM

## 2018-07-25 DIAGNOSIS — R06.02 SHORTNESS OF BREATH: ICD-10-CM

## 2018-07-25 DIAGNOSIS — I10 ESSENTIAL (PRIMARY) HYPERTENSION: ICD-10-CM

## 2018-07-25 PROCEDURE — 99214 OFFICE O/P EST MOD 30 MIN: CPT

## 2018-07-25 PROCEDURE — 93000 ELECTROCARDIOGRAM COMPLETE: CPT

## 2018-08-08 ENCOUNTER — APPOINTMENT (OUTPATIENT)
Dept: ELECTROPHYSIOLOGY | Facility: CLINIC | Age: 68
End: 2018-08-08

## 2018-08-25 PROBLEM — R06.02 SHORTNESS OF BREATH ON EXERTION: Status: ACTIVE | Noted: 2018-03-14

## 2018-10-26 NOTE — DISCHARGE NOTE ADULT - NS AS DC VTE INSTRUCTION
Patient called c/o right breast constant pain x3-4 days near lumpectomy site done in may 9 2018 patient states that she has tried ice to the area, denies fevers, notified Terese Hunt NP appt made for today at 1:00.
Coumadin/Warfarin - Dietary Advice.../Coumadin/Warfarin - Compliance.../Coumadin/Warfarin - Potential for adverse drug reactions and interactions/Coumadin/Warfarin - Follow-up monitoring...

## 2018-10-31 ENCOUNTER — APPOINTMENT (OUTPATIENT)
Dept: ELECTROPHYSIOLOGY | Facility: CLINIC | Age: 68
End: 2018-10-31
Payer: MEDICARE

## 2018-10-31 VITALS
BODY MASS INDEX: 35.15 KG/M2 | SYSTOLIC BLOOD PRESSURE: 113 MMHG | OXYGEN SATURATION: 98 % | DIASTOLIC BLOOD PRESSURE: 74 MMHG | HEART RATE: 56 BPM | WEIGHT: 245 LBS

## 2018-10-31 DIAGNOSIS — Z95.1 PRESENCE OF AORTOCORONARY BYPASS GRAFT: ICD-10-CM

## 2018-10-31 PROCEDURE — 99214 OFFICE O/P EST MOD 30 MIN: CPT

## 2018-10-31 PROCEDURE — 93000 ELECTROCARDIOGRAM COMPLETE: CPT

## 2018-10-31 RX ORDER — ROSUVASTATIN CALCIUM 20 MG/1
20 TABLET, FILM COATED ORAL
Qty: 90 | Refills: 2 | Status: DISCONTINUED | COMMUNITY
Start: 2017-06-09 | End: 2018-10-31

## 2018-10-31 RX ORDER — VALSARTAN 40 MG/1
TABLET ORAL
Refills: 0 | Status: DISCONTINUED | COMMUNITY
End: 2018-10-31

## 2018-10-31 RX ORDER — FUROSEMIDE 20 MG/1
20 TABLET ORAL DAILY
Refills: 2 | Status: DISCONTINUED | COMMUNITY
End: 2018-10-31

## 2018-11-18 PROBLEM — Z95.1 S/P CABG X 1: Status: ACTIVE | Noted: 2017-06-09

## 2019-04-24 ENCOUNTER — APPOINTMENT (OUTPATIENT)
Dept: ELECTROPHYSIOLOGY | Facility: CLINIC | Age: 69
End: 2019-04-24
Payer: MEDICARE

## 2019-04-24 ENCOUNTER — NON-APPOINTMENT (OUTPATIENT)
Age: 69
End: 2019-04-24

## 2019-04-24 VITALS
BODY MASS INDEX: 35.79 KG/M2 | WEIGHT: 250 LBS | SYSTOLIC BLOOD PRESSURE: 122 MMHG | OXYGEN SATURATION: 97 % | HEART RATE: 55 BPM | HEIGHT: 70 IN | DIASTOLIC BLOOD PRESSURE: 75 MMHG

## 2019-04-24 DIAGNOSIS — Z98.890 OTHER SPECIFIED POSTPROCEDURAL STATES: ICD-10-CM

## 2019-04-24 DIAGNOSIS — I25.10 ATHEROSCLEROTIC HEART DISEASE OF NATIVE CORONARY ARTERY W/OUT ANGINA PECTORIS: ICD-10-CM

## 2019-04-24 DIAGNOSIS — I48.92 UNSPECIFIED ATRIAL FLUTTER: ICD-10-CM

## 2019-04-24 PROCEDURE — 93000 ELECTROCARDIOGRAM COMPLETE: CPT

## 2019-04-24 PROCEDURE — 99214 OFFICE O/P EST MOD 30 MIN: CPT

## 2019-04-24 NOTE — PHYSICAL EXAM
[General Appearance - Well Developed] : well developed [Normal Appearance] : normal appearance [General Appearance - In No Acute Distress] : no acute distress [Normal Conjunctiva] : the conjunctiva exhibited no abnormalities [No Oral Pallor] : no oral pallor [Normal Jugular Venous V Waves Present] : normal jugular venous V waves present [Respiration, Rhythm And Depth] : normal respiratory rhythm and effort [Exaggerated Use Of Accessory Muscles For Inspiration] : no accessory muscle use [Auscultation Breath Sounds / Voice Sounds] : lungs were clear to auscultation bilaterally [Abdomen Soft] : soft [Abnormal Walk] : normal gait [Abdomen Tenderness] : non-tender [Nail Clubbing] : no clubbing of the fingernails [Cyanosis, Localized] : no localized cyanosis [Petechial Hemorrhages (___cm)] : no petechial hemorrhages [] : no rash [No Venous Stasis] : no venous stasis [Impaired Insight] : insight and judgment were intact [5th Left ICS - MCL] : palpated at the 5th LICS in the midclavicular line [Bradycardia] : bradycardic [Heart Rate ___] : [unfilled] bpm [Rhythm Regular] : regular [Normal S1] : normal S1 [Normal S2] : normal S2 [No Murmur] : no murmurs heard [2+] : left 2+ [No Pitting Edema] : no pitting edema present

## 2019-04-29 NOTE — HISTORY OF PRESENT ILLNESS
[FreeTextEntry1] : \par 69 yo man with prior h/o atrial flutter -s/p  Atrial Flutter Ablation 2015. He also had atypical Flutter/fib - not ablated. No recurrence of arrhythmia since the ablation procedure 2015. He has had problems with SOB and pleural effusion - hemorrhagic - has been off AC.\par He has prior CABG ( LIMA to LAD) and mitral valve repair/annuloplasty ring 2004.  The  initial echo during atrial arrhythmia showed moderate LV dysfunction. Post ablation there was significant improvement in LV function. Echo Jan 2017 showed EF 55%.\par He has recurrent pleural effusions and underwent VATs. Feeling well now and denies chest pain, SOB, palps, dizziness, lightheadedness. \par  \par \par \par \par

## 2019-04-29 NOTE — REVIEW OF SYSTEMS
[Recent Weight Loss (___ Lbs)] : recent [unfilled] ~Ulb weight loss [see HPI] : see HPI [Negative] : Heme/Lymph [Fever] : no fever [Shortness Of Breath] : no shortness of breath [Dyspnea on exertion] : not dyspnea during exertion

## 2019-04-29 NOTE — REASON FOR VISIT
[Atrial Fibrillation] : atrial fibrillation [FreeTextEntry2] : follow up [FreeTextEntry1] : atrial arrhythmia

## 2019-04-29 NOTE — DISCUSSION/SUMMARY
[FreeTextEntry1] : 1. Atrial arrhythmias: No recurrence. Continue off AC. Recommend ILR implant\par 2. CAD: no ischemic symptoms. \par Followup eval in 6 months\par

## 2019-06-13 NOTE — PATIENT PROFILE ADULT. - FUNCTIONAL SCREEN CURRENT LEVEL: DRESSING, MLM
"Patient seen in office today for lower back pain, radiating to right side.    Same c/o that she saw Dr. Almanzar for in March, April.  Reports no improvement with injections.    I refilled her Neurontin and Mobic.  She had run out of medication and said that she "compelted". Didn't think it was long term.    She reports had MRI done, at [facility], but I could not find reports in .    Please advise on next steps with Patient as pain continues to be major factor.    Thanks  Marina Garibay MD  " (0) independent

## 2019-07-01 PROBLEM — R07.89 CHEST TIGHTNESS OR PRESSURE: Status: ACTIVE | Noted: 2017-06-09

## 2019-07-04 ENCOUNTER — EMERGENCY (EMERGENCY)
Facility: HOSPITAL | Age: 69
LOS: 1 days | Discharge: HOME | End: 2019-07-04
Attending: EMERGENCY MEDICINE
Payer: MEDICARE

## 2019-07-04 VITALS
RESPIRATION RATE: 18 BRPM | TEMPERATURE: 98 F | OXYGEN SATURATION: 96 % | SYSTOLIC BLOOD PRESSURE: 119 MMHG | WEIGHT: 248.02 LBS | DIASTOLIC BLOOD PRESSURE: 69 MMHG | HEART RATE: 82 BPM | HEIGHT: 70 IN

## 2019-07-04 VITALS
RESPIRATION RATE: 16 BRPM | DIASTOLIC BLOOD PRESSURE: 78 MMHG | OXYGEN SATURATION: 97 % | SYSTOLIC BLOOD PRESSURE: 116 MMHG | HEART RATE: 69 BPM

## 2019-07-04 DIAGNOSIS — Z95.4 PRESENCE OF OTHER HEART-VALVE REPLACEMENT: Chronic | ICD-10-CM

## 2019-07-04 LAB
ALBUMIN SERPL ELPH-MCNC: 4.7 G/DL — SIGNIFICANT CHANGE UP (ref 3.3–5)
ALP SERPL-CCNC: 65 U/L — SIGNIFICANT CHANGE UP (ref 40–120)
ALT FLD-CCNC: 30 U/L — SIGNIFICANT CHANGE UP (ref 10–45)
ANION GAP SERPL CALC-SCNC: 13 MMOL/L — SIGNIFICANT CHANGE UP (ref 5–17)
APPEARANCE UR: CLEAR — SIGNIFICANT CHANGE UP
AST SERPL-CCNC: 18 U/L — SIGNIFICANT CHANGE UP (ref 10–40)
BACTERIA # UR AUTO: NEGATIVE — SIGNIFICANT CHANGE UP
BASOPHILS # BLD AUTO: 0 K/UL — SIGNIFICANT CHANGE UP (ref 0–0.2)
BASOPHILS NFR BLD AUTO: 0.3 % — SIGNIFICANT CHANGE UP (ref 0–2)
BILIRUB SERPL-MCNC: 0.9 MG/DL — SIGNIFICANT CHANGE UP (ref 0.2–1.2)
BILIRUB UR-MCNC: NEGATIVE — SIGNIFICANT CHANGE UP
BUN SERPL-MCNC: 16 MG/DL — SIGNIFICANT CHANGE UP (ref 7–23)
CALCIUM SERPL-MCNC: 9.4 MG/DL — SIGNIFICANT CHANGE UP (ref 8.4–10.5)
CHLORIDE SERPL-SCNC: 98 MMOL/L — SIGNIFICANT CHANGE UP (ref 96–108)
CO2 SERPL-SCNC: 28 MMOL/L — SIGNIFICANT CHANGE UP (ref 22–31)
COLOR SPEC: SIGNIFICANT CHANGE UP
CREAT SERPL-MCNC: 0.93 MG/DL — SIGNIFICANT CHANGE UP (ref 0.5–1.3)
DIFF PNL FLD: ABNORMAL
EOSINOPHIL # BLD AUTO: 0.1 K/UL — SIGNIFICANT CHANGE UP (ref 0–0.5)
EOSINOPHIL NFR BLD AUTO: 0.8 % — SIGNIFICANT CHANGE UP (ref 0–6)
EPI CELLS # UR: 0 /HPF — SIGNIFICANT CHANGE UP
GLUCOSE SERPL-MCNC: 112 MG/DL — HIGH (ref 70–99)
GLUCOSE UR QL: NEGATIVE — SIGNIFICANT CHANGE UP
HCT VFR BLD CALC: 37.5 % — LOW (ref 39–50)
HGB BLD-MCNC: 13.1 G/DL — SIGNIFICANT CHANGE UP (ref 13–17)
HYALINE CASTS # UR AUTO: 0 /LPF — SIGNIFICANT CHANGE UP (ref 0–2)
KETONES UR-MCNC: NEGATIVE — SIGNIFICANT CHANGE UP
LEUKOCYTE ESTERASE UR-ACNC: NEGATIVE — SIGNIFICANT CHANGE UP
LYMPHOCYTES # BLD AUTO: 1.2 K/UL — SIGNIFICANT CHANGE UP (ref 1–3.3)
LYMPHOCYTES # BLD AUTO: 10 % — LOW (ref 13–44)
MAGNESIUM SERPL-MCNC: 1.4 MG/DL — LOW (ref 1.6–2.6)
MCHC RBC-ENTMCNC: 30.8 PG — SIGNIFICANT CHANGE UP (ref 27–34)
MCHC RBC-ENTMCNC: 34.8 GM/DL — SIGNIFICANT CHANGE UP (ref 32–36)
MCV RBC AUTO: 88.3 FL — SIGNIFICANT CHANGE UP (ref 80–100)
MONOCYTES # BLD AUTO: 0.8 K/UL — SIGNIFICANT CHANGE UP (ref 0–0.9)
MONOCYTES NFR BLD AUTO: 6.6 % — SIGNIFICANT CHANGE UP (ref 2–14)
NEUTROPHILS # BLD AUTO: 9.7 K/UL — HIGH (ref 1.8–7.4)
NEUTROPHILS NFR BLD AUTO: 82.4 % — HIGH (ref 43–77)
NITRITE UR-MCNC: NEGATIVE — SIGNIFICANT CHANGE UP
PH UR: 6 — SIGNIFICANT CHANGE UP (ref 5–8)
PHOSPHATE SERPL-MCNC: 2.9 MG/DL — SIGNIFICANT CHANGE UP (ref 2.5–4.5)
PLATELET # BLD AUTO: 209 K/UL — SIGNIFICANT CHANGE UP (ref 150–400)
POTASSIUM SERPL-MCNC: 3.5 MMOL/L — SIGNIFICANT CHANGE UP (ref 3.5–5.3)
POTASSIUM SERPL-SCNC: 3.5 MMOL/L — SIGNIFICANT CHANGE UP (ref 3.5–5.3)
PROT SERPL-MCNC: 7.8 G/DL — SIGNIFICANT CHANGE UP (ref 6–8.3)
PROT UR-MCNC: NEGATIVE — SIGNIFICANT CHANGE UP
RBC # BLD: 4.25 M/UL — SIGNIFICANT CHANGE UP (ref 4.2–5.8)
RBC # FLD: 13.7 % — SIGNIFICANT CHANGE UP (ref 10.3–14.5)
RBC CASTS # UR COMP ASSIST: 0 /HPF — SIGNIFICANT CHANGE UP (ref 0–4)
SODIUM SERPL-SCNC: 139 MMOL/L — SIGNIFICANT CHANGE UP (ref 135–145)
SP GR SPEC: 1.01 — SIGNIFICANT CHANGE UP (ref 1.01–1.02)
UROBILINOGEN FLD QL: NEGATIVE — SIGNIFICANT CHANGE UP
WBC # BLD: 11.8 K/UL — HIGH (ref 3.8–10.5)
WBC # FLD AUTO: 11.8 K/UL — HIGH (ref 3.8–10.5)
WBC UR QL: 1 /HPF — SIGNIFICANT CHANGE UP (ref 0–5)

## 2019-07-04 PROCEDURE — 83735 ASSAY OF MAGNESIUM: CPT

## 2019-07-04 PROCEDURE — 81001 URINALYSIS AUTO W/SCOPE: CPT

## 2019-07-04 PROCEDURE — 82962 GLUCOSE BLOOD TEST: CPT

## 2019-07-04 PROCEDURE — 71046 X-RAY EXAM CHEST 2 VIEWS: CPT

## 2019-07-04 PROCEDURE — 80053 COMPREHEN METABOLIC PANEL: CPT

## 2019-07-04 PROCEDURE — 93005 ELECTROCARDIOGRAM TRACING: CPT

## 2019-07-04 PROCEDURE — 71046 X-RAY EXAM CHEST 2 VIEWS: CPT | Mod: 26

## 2019-07-04 PROCEDURE — 85027 COMPLETE CBC AUTOMATED: CPT

## 2019-07-04 PROCEDURE — 84100 ASSAY OF PHOSPHORUS: CPT

## 2019-07-04 PROCEDURE — 99284 EMERGENCY DEPT VISIT MOD MDM: CPT

## 2019-07-04 PROCEDURE — 96360 HYDRATION IV INFUSION INIT: CPT

## 2019-07-04 PROCEDURE — 99283 EMERGENCY DEPT VISIT LOW MDM: CPT | Mod: 25

## 2019-07-04 PROCEDURE — 93010 ELECTROCARDIOGRAM REPORT: CPT

## 2019-07-04 RX ORDER — SODIUM CHLORIDE 9 MG/ML
1000 INJECTION INTRAMUSCULAR; INTRAVENOUS; SUBCUTANEOUS ONCE
Refills: 0 | Status: COMPLETED | OUTPATIENT
Start: 2019-07-04 | End: 2019-07-04

## 2019-07-04 RX ADMIN — SODIUM CHLORIDE 1000 MILLILITER(S): 9 INJECTION INTRAMUSCULAR; INTRAVENOUS; SUBCUTANEOUS at 10:58

## 2019-07-04 RX ADMIN — SODIUM CHLORIDE 1000 MILLILITER(S): 9 INJECTION INTRAMUSCULAR; INTRAVENOUS; SUBCUTANEOUS at 12:00

## 2019-07-04 NOTE — ED ADULT NURSE NOTE - NSIMPLEMENTINTERV_GEN_ALL_ED
Implemented All Universal Safety Interventions:  [place] to call system. Call bell, personal items and telephone within reach. Instruct patient to call for assistance. Room bathroom lighting operational. Non-slip footwear when patient is off stretcher. Physically safe environment: no spills, clutter or unnecessary equipment. Stretcher in lowest position, wheels locked, appropriate side rails in place.

## 2019-07-04 NOTE — ED ADULT NURSE NOTE - OBJECTIVE STATEMENT
Patient came in complaining of malaise. Patient stated than when he woke up this morning he felt weak, but went to bed feeling fine.  Had similar episode in past when he required a cardiac ablation for a fib. Pt alert and orientedX4. Breathing easy and non labored. Ambulatory. No nausea or vomiting. Speech clear.

## 2019-07-04 NOTE — ED PROVIDER NOTE - OBJECTIVE STATEMENT
Patient presenting complaining of malaise.  Began on awakening this morning, went to bed feeling fine.  Had similar episode in past when he required a cardiac ablation for a fib.  Denying associated chest pains, headaches, visual changes, shortness of breath, fevers, abdominal pains, nausea, vomiting, dysuria, hematuria, diarrhea, back pains or sick contacts.

## 2019-07-04 NOTE — ED PROVIDER NOTE - ATTENDING CONTRIBUTION TO CARE
Patient seen and evaluated with resident/NP/PA, however HPI, ROS, PE and MDM as documented authored by myself unless otherwise noted- Umberto Wooten MD

## 2019-07-04 NOTE — ED PROVIDER NOTE - NSFOLLOWUPINSTRUCTIONS_ED_ALL_ED_FT
1- Continue all medications as prescribed  2- Follow up with your doctor  3- Return to ER for any new or worsening symptoms

## 2019-07-04 NOTE — ED PROVIDER NOTE - NSFOLLOWUPCLINICS_GEN_ALL_ED_FT
[facility] General Internal Medicine  General Internal Medicine  [address] [id number]  Phone: [phone]  Fax:   Follow Up Time:

## 2019-07-04 NOTE — ED PROVIDER NOTE - CLINICAL SUMMARY MEDICAL DECISION MAKING FREE TEXT BOX
Patient presenting with non specific malaise since this morning, afebrile, no associated symptoms, no focal findings on exam - plan for screening workup and likely outpatient follow up.

## 2019-07-08 ENCOUNTER — APPOINTMENT (OUTPATIENT)
Dept: THORACIC SURGERY | Facility: CLINIC | Age: 69
End: 2019-07-08
Payer: MEDICARE

## 2019-07-08 VITALS
HEART RATE: 69 BPM | DIASTOLIC BLOOD PRESSURE: 73 MMHG | WEIGHT: 245 LBS | SYSTOLIC BLOOD PRESSURE: 114 MMHG | TEMPERATURE: 98.2 F | OXYGEN SATURATION: 98 % | RESPIRATION RATE: 16 BRPM | BODY MASS INDEX: 35.07 KG/M2 | HEIGHT: 70 IN

## 2019-07-08 PROCEDURE — 99214 OFFICE O/P EST MOD 30 MIN: CPT

## 2019-07-08 RX ORDER — TORSEMIDE 20 MG/1
20 TABLET ORAL
Refills: 0 | Status: DISCONTINUED | COMMUNITY
End: 2019-07-08

## 2019-07-08 RX ORDER — TORSEMIDE 20 MG/1
20 TABLET ORAL
Refills: 0 | Status: ACTIVE | COMMUNITY

## 2019-07-08 NOTE — PHYSICAL EXAM
[Sclera] : the sclera and conjunctiva were normal [Extraocular Movements] : extraocular movements were intact [Neck Appearance] : the appearance of the neck was normal [Jugular Venous Distention Increased] : there was no jugular-venous distention [Neck Cervical Mass (___cm)] : no neck mass was observed [Respiration, Rhythm And Depth] : normal respiratory rhythm and effort [] : no respiratory distress [Auscultation Breath Sounds / Voice Sounds] : lungs were clear to auscultation bilaterally [Exaggerated Use Of Accessory Muscles For Inspiration] : no accessory muscle use [Diminished Respiratory Excursion] : normal chest expansion [Heart Rate And Rhythm] : heart rate was normal and rhythm regular [Bowel Sounds] : normal bowel sounds [Abdomen Soft] : soft [Abdomen Tenderness] : non-tender [Cervical Lymph Nodes Enlarged Anterior Bilaterally] : anterior cervical [Cervical Lymph Nodes Enlarged Posterior Bilaterally] : posterior cervical [Supraclavicular Lymph Nodes Enlarged Bilaterally] : supraclavicular [Abnormal Walk] : normal gait [Skin Color & Pigmentation] : normal skin color and pigmentation [No Focal Deficits] : no focal deficits [Oriented To Time, Place, And Person] : oriented to person, place, and time [Affect] : the affect was normal [Impaired Insight] : insight and judgment were intact [Mood] : the mood was normal

## 2019-07-11 NOTE — ASSESSMENT
[FreeTextEntry1] : 68 year old M presenting for one year follow up. He is S/P Drainage of Right Pleural Effusion, Decortication on 4/13/2018. Pathology revealed Pleural peel, excision: organizing fibrinous pleuritis; Pleura, biopsy: chronic pleural and fibrin; Pleura, biopsy: fibrosis. \par \par Medical history includes asbestos exposure, CHF, CAD s/p CABG, and mitral valve repair. \par \par CXR 7/4/19:\par -The lungs are clear. \par -There are no pleural effusions.\par - No pneumothorax. \par \par CT chest 6/27/19:\par -Mild right basilar pleural thickening noted. \par -Previously seen small right basilar pleural fluid no longer present. \par -RLL bandlike density measuring approx 1.5 cm in thickness, which improved in thickness from prior study which measured 1.8cm of thickness. \par \par I have reviewed the images with the patient and made the following recommendations. I have recommended that the patient follow up in one year with a CT scan of the chest. \par \par Written by Elvia Archibald NP, acting as a scribe for Rajinder Rogers MD.\par The documentation recorded by the scribe accurately reflects the service I personally performed and the decisions made by me. Rajinder Rogers MD\par

## 2019-07-11 NOTE — HISTORY OF PRESENT ILLNESS
[FreeTextEntry1] : 68 year old M presenting for one year follow up. He is S/P Drainage of Right Pleural Effusion, Decortication on 4/13/2018. Pathology revealed Pleural peel, excision: organizing fibrinous pleuritis; Pleura, biopsy: chronic pleural and fibrin; Pleura, biopsy: fibrosis. \par \par Medical history includes asbestos exposure, CHF, CAD s/p CABG, and mitral valve repair. \par \par CXR 7/4/19:\par -The lungs are clear. \par -There are no pleural effusions.\par - No pneumothorax. \par \par CT chest 6/27/19:\par -Mild right basilar pleural thickening noted. \par -Previously seen small right basilar pleural fluid no longer present. \par -RLL bandlike density measuring approx 1.5 cm in thickness, which improved in thickness from prior study which measured 1.8cm of thickness. \par \par CT Chest 7/19/2018 reports:\par -Right pleural effusion has resolved\par -No new or enlarging lung nodule \par \par The patient denies shortness of breath, cough, fever, chills, dysphagia or hemoptysis.

## 2019-07-11 NOTE — CONSULT LETTER
[Courtesy Letter:] : I had the pleasure of seeing your patient, [unfilled], in my office today. [( Thank you for referring [unfilled] for consultation for _____ )] : Thank you for referring [unfilled] for consultation for [unfilled] [Please see my note below.] : Please see my note below. [Sincerely,] : Sincerely, [FreeTextEntry2] : Chirag Flynn MD [FreeTextEntry3] : Rajinder Rogers MD \par Attending Surgeon \par Division of Thoracic Surgery \par , Cardiovascular and Thoracic Surgery \par [facility]\par

## 2019-10-23 ENCOUNTER — APPOINTMENT (OUTPATIENT)
Dept: ELECTROPHYSIOLOGY | Facility: CLINIC | Age: 69
End: 2019-10-23

## 2019-10-25 ENCOUNTER — APPOINTMENT (OUTPATIENT)
Dept: ELECTROPHYSIOLOGY | Facility: CLINIC | Age: 69
End: 2019-10-25

## 2020-07-06 ENCOUNTER — APPOINTMENT (OUTPATIENT)
Dept: THORACIC SURGERY | Facility: CLINIC | Age: 70
End: 2020-07-06
Payer: MEDICARE

## 2020-07-06 DIAGNOSIS — Z87.09 PERSONAL HISTORY OF OTHER DISEASES OF THE RESPIRATORY SYSTEM: ICD-10-CM

## 2020-07-06 PROCEDURE — 99443: CPT | Mod: 95

## 2020-07-09 NOTE — REASON FOR VISIT
[Verbal consent obtained from patient] : the patient, [unfilled] [Follow-Up: _____] : a [unfilled] follow-up visit [FreeTextEntry4] : Elvia Archibald, NP

## 2020-07-09 NOTE — HISTORY OF PRESENT ILLNESS
[FreeTextEntry1] : 69 year old M presenting for one year follow up. He is S/P Drainage of Right Pleural Effusion, Decortication on 4/13/2018. Pathology revealed Pleural peel, excision: organizing fibrinous pleuritis; Pleura, biopsy: chronic pleural and fibrin; Pleura, biopsy: fibrosis. \par \par Medical history includes asbestos exposure, CHF, CAD s/p CABG, and mitral valve repair. \par \par CT chest scan 6/25/2020:\par -Mild right -sided pleural thickening with adjacent atelectasis. Most likely postinflammatory in nature\par -Mild pulmonary scarring\par -No pleural effusion \par \par CXR 7/4/19:\par -The lungs are clear. \par -There are no pleural effusions.\par - No pneumothorax. \par \par CT chest 6/27/19:\par -Mild right basilar pleural thickening noted. \par -Previously seen small right basilar pleural fluid no longer present. \par -RLL bandlike density measuring approx 1.5 cm in thickness, which improved in thickness from prior study which measured 1.8cm of thickness. \par \par CT Chest 7/19/2018 reports:\par -Right pleural effusion has resolved\par -No new or enlarging lung nodule \par \par The patient denies shortness of breath, fever, chills, dysphagia or hemoptysis. \par

## 2020-07-09 NOTE — ASSESSMENT
[FreeTextEntry1] : 69 year old M presenting for one year follow up. He is S/P Drainage of Right Pleural Effusion, Decortication on 4/13/2018. Pathology revealed Pleural peel, excision: organizing fibrinous pleuritis; Pleura, biopsy: chronic pleural and fibrin; Pleura, biopsy: fibrosis. \par \par Medical history includes asbestos exposure, CHF, CAD s/p CABG, and mitral valve repair. \par \par CT chest scan 6/25/2020:\par -Mild right -sided pleural thickening with adjacent atelectasis. Most likely postinflammatory in nature\par -Mild pulmonary scarring\par -No pleural effusion \par \par I have reviewed the images with the patient and made the following recommendations. No further visits are required at this time, however, the patient may return to the office as needed if any issues arise. \par \par Written by Elvia Archibald NP, acting as a scribe for Rajinder Rogers MD.\par The documentation recorded by the scribe accurately reflects the service I personally performed and the decisions made by me. Rajinder Rogers MD\par

## 2020-07-27 NOTE — PROGRESS NOTE ADULT - PROBLEM SELECTOR PLAN 7
Lucero,    Please review this. Pt was on 1 tab for 14 days then 2 tabs for 16 day.    If you agree it is ready to be sent to pharmacy    I have refill set at 50mg tabs instead of 25mg, 1 per day.    Refilled per protocol.    Next appt: 8-7-20    Last appt: 7-1-20  Follow Up: 1 month  
s/p exposure in past

## 2020-11-12 NOTE — PATIENT PROFILE ADULT. - NSTOBACCONEVERSMOKERY/N_GEN_A
Detail Level: Detailed Render Post-Care Instructions In Note?: no Post-Care Instructions: I reviewed with the patient in detail post-care instructions. Patient is to wear sunprotection, and avoid picking at any of the treated lesions. Pt may apply Vaseline to crusted or scabbing areas. Duration Of Freeze Thaw-Cycle (Seconds): 0 Consent: The patient's consent was obtained including but not limited to risks of crusting, scabbing, blistering, scarring, darker or lighter pigmentary change, recurrence, incomplete removal and infection. Medical Necessity Information: It is in your best interest to select a reason for this procedure from the list below. All of these items fulfill various CMS LCD requirements except the new and changing color options. Medical Necessity Clause: This procedure was medically necessary because the lesions that were treated were: No

## 2020-12-17 ENCOUNTER — APPOINTMENT (OUTPATIENT)
Dept: CV DIAGNOSTICS | Facility: HOSPITAL | Age: 70
End: 2020-12-17

## 2021-01-08 ENCOUNTER — APPOINTMENT (OUTPATIENT)
Dept: CV DIAGNOSTICS | Facility: HOSPITAL | Age: 71
End: 2021-01-08

## 2021-01-08 ENCOUNTER — OUTPATIENT (OUTPATIENT)
Dept: OUTPATIENT SERVICES | Facility: HOSPITAL | Age: 71
LOS: 1 days | End: 2021-01-08
Payer: MEDICARE

## 2021-01-08 DIAGNOSIS — I25.10 ATHEROSCLEROTIC HEART DISEASE OF NATIVE CORONARY ARTERY WITHOUT ANGINA PECTORIS: ICD-10-CM

## 2021-01-08 DIAGNOSIS — Z95.4 PRESENCE OF OTHER HEART-VALVE REPLACEMENT: Chronic | ICD-10-CM

## 2021-01-08 PROCEDURE — 93018 CV STRESS TEST I&R ONLY: CPT

## 2021-01-08 PROCEDURE — 93017 CV STRESS TEST TRACING ONLY: CPT

## 2021-01-08 PROCEDURE — 93016 CV STRESS TEST SUPVJ ONLY: CPT

## 2021-01-08 PROCEDURE — 78452 HT MUSCLE IMAGE SPECT MULT: CPT | Mod: 26

## 2021-01-08 PROCEDURE — A9500: CPT

## 2021-01-08 PROCEDURE — 78452 HT MUSCLE IMAGE SPECT MULT: CPT

## 2021-08-25 NOTE — ED PROVIDER NOTE - CARE PLAN
Conjuntivae and eyelids appear normal,  Sclerae : White without injection Principal Discharge DX:	Pleural effusion  Secondary Diagnosis:	Dyspnea on exertion

## 2022-07-05 NOTE — CONSULT NOTE ADULT - SUBJECTIVE AND OBJECTIVE BOX
----- Message from Juaquin Meyer MD sent at 7/5/2022  2:24 PM CDT -----  Please inform patient that labs overall are normal and reassuring.  Continue with all current medications.   [facility] DIVISION OF PULMONARY, CRITICAL CARE AND SLEEP MEDICINE - PULMONARY CONSULTATION NOTE  PHONE NUMBER [phone] MONDAY-FRIDAY 8A-5P or [phone] on NIGHTS/WEEKENDS/HOLIDAYS    NAME: VIDHI RODRIGUEZ  MEDICAL RECORD NUMBER: [id number]    CHIEF COMPLAINT: Patient is a 67y old  Male who presents with a chief complaint of sob (25 Mar 2018 15:40)    HISTORY OF PRESENT ILLNESS: 67M Afib on AC, HTN, HLD, CAD s/p CABG, MVR presenting for progressive dyspnea on exertion. He notices increasing dyspnea while walking a few blocks. He denies cough, fevers, chills or hemoptysis. He was recently seen by Dr. Hoffman as an outpatient and started on Lasix for his symptoms. Unfortunately he has not had resolution of his symptoms. He denies URI symptoms. He denies sick contacts or recent travels.    He denies nausea, vomiting, or abdominal pain. He denies other changes to his health. He takes Coumadin - but did not take his dose yesterday. His INR is > 2 today. He denies any signs or symptoms or bleeding.    He denies lower extremity edema. He denies changes in his weight. He denies any history of cancer. He did have a lung nodule in the past and had a PET/CT in 2014 which was negative. He had followup imaging showwing resolution of his lung nodules.       ====================BACKGROUND INFORMATION============================    FAMILY HISTORY: FAMILY HISTORY:  No pertinent family history in first degree relatives      PAST MEDICAL AND SURGICAL HISTORY: PAST MEDICAL & SURGICAL HISTORY:  Kidney stones  Hyperlipidemia  Hypertension  CAD (coronary artery disease)  H/O mitral valve replacement      ALLERGIES:Allergies: penicillin ([place])    HOME MEDICATIONS: reviewed     OUTPATIENT PULMONARY DOCTOR: none - planning to see Dr. Chirag Flynn    SOCIAL HISTORY  TOBACCO USE: denied  ALCOHOL: social  DRUGS: denied   MARITAL STATUS:   EMPLOYMENT: retired  EXPOSURES: none  RECENT TRAVELS: none  CODE STATUS: full code    ====================REVIEW OF SYSTEMS=====================================  CONSTITUTIONAL: denies complaints  CARDIOVASCULAR: denies chest pain or palpitations  PULMONARY: BROOKS, no cough or hemoptysis  GASTROINTESTINAL: denies nausea, vomiting, or abdominal pain  [x] ALL OTHER REVIEW OF SYSTEMS ARE NEGATIVE   [] UNABLE TO OBTAIN REVIEW OF SYSTEMS DUE TO ______________      ====================PHYSICAL EXAM=========================================    VITALS: ICU Vital Signs Last 24 Hrs  T(C): 37.4 (26 Mar 2018 04:20), Max: 37.4 (26 Mar 2018 04:20)  T(F): 99.3 (26 Mar 2018 04:20), Max: 99.3 (26 Mar 2018 04:20)  HR: 55 (26 Mar 2018 04:20) (55 - 69)  BP: 99/62 (26 Mar 2018 04:20) (99/62 - 128/75)  BP(mean): --  ABP: --  ABP(mean): --  RR: 18 (26 Mar 2018 04:20) (17 - 18)  SpO2: 94% (26 Mar 2018 04:20) (94% - 95%)      INTAKE and OUTPUT: I&O's Summary      WEIGHT: Daily Height in cm: 177.8 (25 Mar 2018 15:19)    Daily     GLUCOSE: CAPILLARY BLOOD GLUCOSE    VENTILATOR SETTINGS: N/A    GENERAL: AAOx3, NAD, comfortable  HEENT: normocephalic, atraumatic, anicteric, EOMI, MMM, nares clear, trachea midline  NECK: supple, no JVD  LYMPH NODES: no palpable supraclavicular LAD  CARDIOVASCULAR: irreg, irreg, S1S2  PULMONARY: decreased BS right lower lung fields, no wheezing or rhonchi  ABDOMEN: soft, NT, ND, +BS  SKIN: warm and dry  EXTREMITIES: no clubbing or cyanosis  NEUROLOGIC: moves all extremities, nonfocal exam  PSYCHIATRIC: calm and in good spirits    ====================MEDICATIONS===========================================  MEDICATIONS  (STANDING):  aspirin  chewable 81 milliGRAM(s) Oral daily  atorvastatin 20 milliGRAM(s) Oral at bedtime  metoprolol tartrate 12.5 milliGRAM(s) Oral two times a day  tamsulosin 0.4 milliGRAM(s) Oral daily  valsartan 40 milliGRAM(s) Oral daily      MEDICATIONS  (PRN):  ondansetron Injectable 4 milliGRAM(s) IV Push every 6 hours PRN Nausea      ====================LABORATORY RESULTS====================================  CBC Full  -  ( 26 Mar 2018 06:59 )  WBC Count : 6.57 K/uL  Hemoglobin : 11.1 g/dL  Hematocrit : 33.7 %  Platelet Count - Automated : 270 K/uL  Mean Cell Volume : 86.9 fl  Mean Cell Hemoglobin : 28.6 pg  Mean Cell Hemoglobin Concentration : 32.9 gm/dL                                      03-26    137  |  99  |  13  ----------------------------<  96  4.1   |  26  |  0.84    Ca    9.6      26 Mar 2018 06:20    TPro  7.5  /  Alb  3.9  /  TBili  0.4  /  DBili  x   /  AST  17  /  ALT  15  /  AlkPhos  53  03-26        PT/INR - ( 26 Mar 2018 06:59 )   PT: 27.0 sec;   INR: 2.35 ratio      PTT - ( 25 Mar 2018 10:11 )  PTT:47.4 sec    Creatinine Trend: 0.84<--, 0.85<--      ====================RADIOLOGY and ECHOCARDIOGRAPHY=======================    CXR: < from: Xray Chest 2 Views PA/Lat (03.25.18 @ 10:38) >  IMPRESSION:   Small to moderate right pleural effusion.     < end of copied text >      CT CHEST: < from: CT Angio Chest w/ IV Cont (03.25.18 @ 11:12) >  FINDINGS:    LUNGS AND LARGE AIRWAYS: Right upper lobe lung cysts. Atelectasis of the right lower lobe. The lungs are otherwise clear. The airways are   unremarkable.    PLEURA: The right pleural effusion occupies approximately 25% of the right hemithorax. No pneumothorax.    PULMONARY ARTERY: Main pulmonary artery is normal in caliber. There is no main, central, lobar, segmental or subsegmental pulmonary embolus.    AORTA: Atheromatous calcifications of the aorta. Normal caliber aorta and main pulmonary artery.    HEART: Heart size is normal. No pericardial effusion. Status post CABG. Coronary artery calcifications. Mitral valve annuloplasty ring.    MEDIASTINUM AND ARVIN: No lymphadenopathy.    CHEST WALL AND LOWER NECK: Sternotomy.    VISUALIZED UPPER ABDOMEN: Bilateral upper pole renal cysts.    BONES: Mild degenerative changes.    IMPRESSION:     1.  No pulmonary embolism.   2.   Right pleural effusion.    < end of copied text >      ECHOCARDIOGRAPHY: < from: TTE with Doppler (w/Cont) (03.14.18 @ 08:06) >  Conclusions:  1. An annuloplasty ring is seen in the mitral position.  Minimal mitral regurgitation. Peak mitral valve gradient  equals 12 mm Hg, mean transmitral valve gradient equals 6  mm Hg (at HR 70-75 bpm), which is elevated even in the  setting of a repaired valve.  2. Mild concentric left ventricular hypertrophy.  3. Normal left ventricular systolic function. Paradoxical  septal motion consistent with prior sternotomy.  Endocardial visualization enhanced with intravenous  injection of echo contrast (Definity). No left ventricular  thrombus.    < end of copied text >

## 2022-07-24 NOTE — PRE-OP CHECKLIST - 1.
Nephrology Progress Note    Patient: Meet Larios               Sex: male            MRN:  [id number]      YOB: 1984      Age:  38 year old           Date: 7/24/2022    Subjective:   Did not get dialysis yesterday.  Attempted temp line, but could not be placed.  Temp line catheter placed for dialysis overnight.    PMHx, FHx, SHx, and review of systems reviewed and otherwise unchanged.    Reviewed on 7/24/2022    Objective:     Visit Vitals  /57   Pulse (!) 51   Temp 96.3 °F (35.7 °C) (Axillary)   Resp 19   Ht 5' 5\" (1.651 m)   Wt 90.4 kg (199 lb 4.7 oz)   SpO2 100%   BMI 33.16 kg/m²      I/O last 3 completed shifts:  In: 863.1 [P.O.:120; I.V.:592; IV Piggyback:151]  Out: 230 [Urine:30; Stool:200]    Physical Exam:  General Appearance: Alert, no distress   HEENT:  Extraocular motion intact   Neck: Trachea midline, no adenopathy, no JVD   Lungs:   Clear to auscultation bilaterally, respirations unlabored   Heart:  Regular rate and rhythm, no murmurs or rub   Gastrointestinal:   Soft, non-tender, bowel sounds active   Musculoskeletal: No cyanosis or edema   Skin: No rashes or lesions   Neuro: Answers questions appropriately   HD Access: Left upper extremity AV fistula nonfunctional,  Fem temp line     Lab/Data Reviewed:  Recent Labs   Lab 07/24/22  0448 07/24/22  0029 07/23/22  2217 07/23/22  0259 07/22/22  0709 07/21/22  0713 07/20/22  1145   SODIUM 134* 133* 134* 132* 135 137 139   POTASSIUM 5.9* 5.5* 6.6* 5.7* 4.9 4.9 4.4   CHLORIDE 99 98 99 101 101 102 105   CO2 25 27 27 25 24 27 27   BUN 55* 51* 51* 48* 40* 29* 21*   CREATININE 5.40* 5.45* 5.43* 4.64* 4.10* 3.48* 2.70*   GLUCOSE 103* 230* 95 137* 105* 114* 152*   CALCIUM 9.0 8.7 9.3 9.1 9.3 10.2 9.4   PHOS 4.8*  --   --  4.3 4.6 4.5 4.2   MG 2.1  --   --  2.0 2.2 2.3 2.1      Recent Labs   Lab 07/24/22  0448 07/23/22  0259 07/22/22  0709   WBC 8.0 8.9 8.5   HGB 11.7* 12.5* 14.3   HCT 38.3* 40.9 47.7   * 107* 105*           Assessment/Plan     1) ESRD - HD 3 x weekly ([facility])  - Hypotension may limit ability to UF, pressors support as needed  - Will plan AV fistulagram on Monday  - Temp dialysis catheter in fem placed overnight.  HD RN called for STAT dialysis today for hyperkalemia, failing meds     2) Anemia CKD - On AN[facility]ONY therapy at [facility]  - Target Hb 10-11 g/dL, at goal      3) HTN CKD now with significant hypotension  -UF with HD as BP allows  -Increased midodrine dose    4) Secondary hyperparathyroidism of renal origin -  -Monitor calcium and phosph, continue PhosLo     5) Hyperkalemia - Will keep at bay with HD.  1K bath today    [facility]   Office Phone: [phone]  Office Fax: [phone]   preop teaching done pt./wife

## 2022-10-03 NOTE — DIETITIAN INITIAL EVALUATION ADULT. - EST PROTEIN NEEDS5
75.2 Colchicine Counseling:  Patient counseled regarding adverse effects including but not limited to stomach upset (nausea, vomiting, stomach pain, or diarrhea).  Patient instructed to limit alcohol consumption while taking this medication.  Colchicine may reduce blood counts especially with prolonged use.  The patient understands that monitoring of kidney function and blood counts may be required, especially at baseline. The patient verbalized understanding of the proper use and possible adverse effects of colchicine.  All of the patient's questions and concerns were addressed.

## 2023-08-07 NOTE — PRE-OP CHECKLIST - BMI (KG/M2)
Zbigniew Steele called by this RN regarding lab result per Dr.Arnau Rodriguez note, see below, no answer, left voice message to call back.     Gil Rodriguez MD   8/4/2023  8:00 AM CDT Back to Top      - Inform pt that his Prostatic Specific Antigen (PSA) continue being slightly elevated (6.99). He should follow up with Urologist as recommended.  - Bad cholesterol (LDL) was slightly elevated (105, normal <100) with normal triglycerides and good cholesterol (HDL). No need to start medication at this time, but you need to follow a low fat diet (ex. avoid fried food, pork, butter, whole milk, etc.).  - He continue to be on \"prediabetic\" levels (A1c: 5.7; if pass> 6.5 = DM). I would suggest you start avoiding high caloric foods (ex. sweet bread, candy, soda, flour, pasta, tortilla, alcohol, etc.).  - Normal thyroid function.       
34.7

## 2023-10-02 NOTE — PROGRESS NOTE ADULT - ADDITIONAL PE
Medical screening examination initiated.  I have conducted a focused provider triage encounter, findings are as follows:    Brief history of present illness:  patient presents to ER for bilateral lower extremity pain/swelling. Patient refuses to give any further information/details.    There were no vitals filed for this visit.    Pertinent physical exam:  no acute distress    Brief workup plan:  workup    Preliminary workup initiated; this workup will be continued and followed by the physician or advanced practice provider that is assigned to the patient when roomed.  
as above
as above-2 chest tubes in place-right side
as above
as above

## 2024-01-17 NOTE — CONSULT NOTE ADULT - PROBLEM SELECTOR RECOMMENDATION 5
No change -DVT proph - patient on AC - but please hold in preparation for possible thoracentesis this week  -GI proph  -OOB/ambulation  -Patient without evidence of hypoxemia or respiratory distress

## 2024-02-01 NOTE — PATIENT PROFILE ADULT. - NS TRANSFER PATIENT BELONGINGS
[Full] : full [Clear] : clear [Linear/Goal Directed] : linear/goal directed Cell Phone/PDA (specify)/Clothing [Average] : average [WNL] : within normal limits [FreeTextEntry8] : neutral
